# Patient Record
Sex: FEMALE | Race: WHITE | NOT HISPANIC OR LATINO | URBAN - METROPOLITAN AREA
[De-identification: names, ages, dates, MRNs, and addresses within clinical notes are randomized per-mention and may not be internally consistent; named-entity substitution may affect disease eponyms.]

---

## 2017-01-10 ENCOUNTER — OFFICE VISIT (OUTPATIENT)
Dept: OBGYN | Facility: CLINIC | Age: 37
End: 2017-01-10
Attending: ADVANCED PRACTICE MIDWIFE
Payer: COMMERCIAL

## 2017-01-10 VITALS — WEIGHT: 181 LBS | BODY MASS INDEX: 26.72 KG/M2 | SYSTOLIC BLOOD PRESSURE: 114 MMHG | DIASTOLIC BLOOD PRESSURE: 67 MMHG

## 2017-01-10 DIAGNOSIS — L92.9 GRANULATION TISSUE: Primary | ICD-10-CM

## 2017-01-10 PROCEDURE — 99211 OFF/OP EST MAY X REQ PHY/QHP: CPT | Mod: ZF

## 2017-01-10 ASSESSMENT — PAIN SCALES - GENERAL: PAINLEVEL: NO PAIN (0)

## 2017-01-10 NOTE — PROGRESS NOTES
Chief Complaint: Pt for follow-up perineal healing.    Subjective:  Patient is a 36 year old  female who presents with continued perineal discomfort after episiotomy repair. Onset of symptoms was postpartum, reports treatment with silver nitrate at 6 week postpartum visit, felt improvement x 1 week and then traveling x 2 weeks, continued discomfort. Reports occasional bath, but not as frequent as recommended.  Denies intercourse since delivery. Current birth control: POPs, abstinence.   Patient's LMP was 2016 (approximate).    Pertinent Gyn History:  Menses: absent  Bleeding: reports some spotting  Contraception: abstinence and POPs    REVIEW OF SYSTEMS  C: NEGATIVE for fever, chills  E: NEGATIVE for vision changes   R: NEGATIVE for significant cough or SOB  CV: NEGATIVE for chest pain, palpitations   GI: NEGATIVE for nausea, abdominal pain, heartburn, or change in bowel habits  : NEGATIVE for frequency, dysuria, or hematuria  M: NEGATIVE for significant arthralgias or myalgia  N: NEGATIVE for weakness, dizziness or paresthesias or headache    OBJECTIVE:  /67 mmHg  Wt 82.101 kg (181 lb)  LMP 2016 (Approximate)  BMI: Body mass index is 26.72 kg/(m^2).  GENERAL APPEARANCE: healthy, alert and no distress  PSYCH: mentation appears normal and affect normal/bright    PELVIC EXAM:  External Genitalia: WNL  Bartholin's/Urethral Meatus/[place]'s (BUS):  WNL/Negative  Vagina:  Normal mucosa, granulated tissue on R posterior introitus, erythema  Anus/Perineum:  Well healed    WET PREP:Not done  Gonorrhea and chlamydia screen obtained: NO    Assessment/Plan:  Discussed continued healing practices (baths, abstinence)  Silver nitrate treatment today  RTC in 1 month, if discomfort continues or worsen, prn if questions or concerns before annual    Over 50% of the 15 minute visit was spent in face to face counseling as stated above. All patient's questions were discussed and answered. Pt agreed to plan of  care.

## 2017-01-10 NOTE — Clinical Note
1/10/2017       RE: Belle Velez  [address]  [facility] [place] [address]     Dear Colleague,    Thank you for referring your patient, Belle Velez, to the [facility] at [facility]. Please see a copy of my visit note below.    Chief Complaint: Pt for follow-up perineal healing.    Subjective:  Patient is a 36 year old  female who presents with continued perineal discomfort after episiotomy repair. Onset of symptoms was postpartum, reports treatment with silver nitrate at 6 week postpartum visit, felt improvement x 1 week and then traveling x 2 weeks, continued discomfort. Reports occasional bath, but not as frequent as recommended.  Denies intercourse since delivery. Current birth control: POPs, abstinence.   Patient's LMP was 2016 (approximate).    Pertinent Gyn History:  Menses: absent  Bleeding: reports some spotting  Contraception: abstinence and POPs    REVIEW OF SYSTEMS  C: NEGATIVE for fever, chills  E: NEGATIVE for vision changes   R: NEGATIVE for significant cough or SOB  CV: NEGATIVE for chest pain, palpitations   GI: NEGATIVE for nausea, abdominal pain, heartburn, or change in bowel habits  : NEGATIVE for frequency, dysuria, or hematuria  M: NEGATIVE for significant arthralgias or myalgia  N: NEGATIVE for weakness, dizziness or paresthesias or headache    OBJECTIVE:  /67 mmHg  Wt 82.101 kg (181 lb)  LMP 2016 (Approximate)  BMI: Body mass index is 26.72 kg/(m^2).  GENERAL APPEARANCE: healthy, alert and no distress  PSYCH: mentation appears normal and affect normal/bright    PELVIC EXAM:  External Genitalia: WNL  Bartholin's/Urethral Meatus/[place]'s (BUS):  WNL/Negative  Vagina:  Normal mucosa, granulated tissue on R posterior introitus, erythema  Anus/Perineum:  Well healed    WET PREP:Not done  Gonorrhea and chlamydia screen obtained: NO    Assessment/Plan:  Discussed continued healing practices (baths,  abstinence)  Silver nitrate treatment today  RTC in 1 month, if discomfort continues or worsen, prn if questions or concerns before annual    Over 50% of the 15 minute visit was spent in face to face counseling as stated above. All patient's questions were discussed and answered. Pt agreed to plan of care.    Again, thank you for allowing me to participate in the care of your patient.      Sincerely,    GREGORIO Parkinson CNM

## 2017-03-06 ENCOUNTER — OFFICE VISIT (OUTPATIENT)
Dept: OBGYN | Facility: CLINIC | Age: 37
End: 2017-03-06
Attending: ADVANCED PRACTICE MIDWIFE
Payer: COMMERCIAL

## 2017-03-06 VITALS
WEIGHT: 177 LBS | HEART RATE: 69 BPM | BODY MASS INDEX: 26.22 KG/M2 | DIASTOLIC BLOOD PRESSURE: 68 MMHG | SYSTOLIC BLOOD PRESSURE: 118 MMHG | HEIGHT: 69 IN

## 2017-03-06 PROCEDURE — 99212 OFFICE O/P EST SF 10 MIN: CPT | Mod: ZF

## 2017-03-06 NOTE — PROGRESS NOTES
"Nursing Notes:   Briana Frazier, [facility]  3/6/2017 10:06 AM  Signed  Chief Complaint   Patient presents with     Consult For     episiotomy f/u       Briana Freddie, [facility] 3/6/2017     Belle is a  here for follow-up of \"skin tag\" and granulation tissue following episiotomy repair. She is just about 4 months postpartum, delivered 16. She had two treatments with silver nitrate,  and again 1/10 for continued perineal discomfort. States that after her January treatment she soaked in the tub twice daily for a month. Despite these treatments, she reports persistence of a \"skin tag\" that she can still see.  She reports continued periodic discomfort while exercising or changing position while seated - this is not a daily occurrence but is bothersome. States she has had intercourse twice since delivery, and that while she is still getting used to how things feel now that she has had a baby, suspects things feel different due to this skin tag and feels it would get irritated if she were to resume intercourse on a regular basis. She is wondering what the next best step is in managing this.     Is not currently taking POPs - is waiting until she is having intercourse on a regular basis. Is considering IUD as it would make remembering to take her pills one less thing to worry about.  Is an , recently started new job. Otherwise no concerns, baby clary John is doing well.     ================================================================  ROS: 10 point ROS neg other than the symptoms noted above in the HPI.     EXAM:  /68 (BP Location: Left arm, Patient Position: Chair, Cuff Size: Adult Regular)  Pulse 69  Ht 1.753 m (5' 9.02\")  Wt 80.3 kg (177 lb)  BMI 26.12 kg/m2    General: healthy, alert, no distress and cooperative  Vulva:  Normal genitalia and Bartholin's, Urethra, [place]'s normal  Vagina:  normal with good muscle tone, episiotomy incision appears well healed but bright pink/red " apparent granulation tissue in the right posterior introitus, approx 1cm long.  Recto-vaginal:  Anus normal    ASSESSMENT:   Encounter Diagnosis   Name Primary?     Disruption of perineal wound, postpartum Yes      Belle is a 35yo with persistent and increasing amount of granulation tissue following episiotomy repair 4 months ago, s/p two silver nitrate treatments.     PLAN:  Recommend follow-up with OB/Gyn for further evaluation and treatment, will likely require surgical intervention. No silver nitrate treatment today.       I, Emerita Herrmann, served as scribe for GREGORIO Anderson CNM, during this encounter.  Patient was seen with student who acted as my scribe. I personally assessed, examined and made medical decisions reflected in the documentation. Any necessary edits to the note have been made by myself. GREGORIO Doran CNM

## 2017-03-06 NOTE — NURSING NOTE
Chief Complaint   Patient presents with     Consult For     episiotomy f/u       Briana Frazier, LIZETTE 3/6/2017

## 2017-03-06 NOTE — MR AVS SNAPSHOT
After Visit Summary   3/6/2017    Blele Velez    MRN: [id number]           Patient Information     Date Of Birth          1980        Visit Information        Provider Department      3/6/2017 10:00 AM Rosa Dela Cruz APRN CNM [facility]        Today's Diagnoses     Disruption of perineal wound, postpartum    -  1       Follow-ups after your visit        Follow-up notes from your care team     Return in about 1 week (around 3/13/2017), or MD consult perineal repair.      Your next 10 appointments already scheduled     Mar 22, 2017  2:45 PM CDT   RETURN EXTENDED with Nikia Jackman MD   [facility] ([facility])    [facility] [address]  [address]  [facility] [address] [phone]              Who to contact     Please call your clinic at [phone] to:    Ask questions about your health    Make or cancel appointments    Discuss your medicines    Learn about your test results    Speak to your doctor   If you have compliments or concerns about an experience at your clinic, or if you wish to file a complaint, please contact [facility] Patient Relations at [phone] or email us at [email or web address]         Additional Information About Your Visit        MyChart Information     [employer] gives you secure access to your electronic health record. If you see a primary care provider, you can also send messages to your care team and make appointments. If you have questions, please call your primary care clinic.  If you do not have a primary care provider, please call [phone] and they will assist you.      Sova is an electronic gateway that provides easy, online access to your medical records. With Sova, you can request a clinic appointment, read your test results, renew a prescription or communicate with your care team.     To access your existing  "account, please contact your [facility] or call [phone] for assistance.        Care EveryWhere ID     This is your Care EveryWhere ID. This could be used by other organizations to access your [place] medical records  [id number]        Your Vitals Were     Pulse Height BMI (Body Mass Index)             69 1.753 m (5' 9.02\") 26.12 kg/m2          Blood Pressure from Last 3 Encounters:   03/06/17 118/68   01/10/17 114/67   12/22/16 106/66    Weight from Last 3 Encounters:   03/06/17 80.3 kg (177 lb)   01/10/17 82.1 kg (181 lb)   12/22/16 82.4 kg (181 lb 11.2 oz)              Today, you had the following     No orders found for display       Primary Care Provider Office Phone # Fax #    Rosa Isaacs Lanie, GREGORIO ISIDRA [phone]       [facility] [address] [facility] [address]  [facility] [address]        Thank you!     Thank you for choosing [facility]  for your care. Our goal is always to provide you with excellent care. Hearing back from our patients is one way we can continue to improve our services. Please take a few minutes to complete the written survey that you may receive in the mail after your visit with us. Thank you!             Your Updated Medication List - Protect others around you: Learn how to safely use, store and throw away your medicines at www.disposemymeds.org.          This list is accurate as of: 3/6/17 11:59 PM.  Always use your most recent med list.                   Brand Name Dispense Instructions for use    ibuprofen 400 MG tablet    ADVIL/MOTRIN    120 tablet    Take 1-2 tablets (400-800 mg) by mouth every 6 hours as needed for other (cramping)       norethindrone 0.35 MG per tablet    MICRONOR    28 tablet    Take 1 tablet (0.35 mg) by mouth daily       PRENATAL VITAMIN PO      Take 1 tablet by mouth daily         "

## 2017-03-06 NOTE — LETTER
"3/6/2017       RE: Belle Velez  [address]  [place] [place] [facility] [address]     Dear Colleague,    Thank you for referring your patient, Belle Velez, to the [facility] at [facility]. Please see a copy of my visit note below.    Nursing Notes:   ClarkeBurchBriana kraus, LIZETTE  3/6/2017 10:06 AM  Signed  Chief Complaint   Patient presents with     Consult For     episiotomy f/u       Briana Freddie, [facility] 3/6/2017     Belle is a  here for follow-up of \"skin tag\" and granulation tissue following episiotomy repair. She is just about 4 months postpartum, delivered 16. She had two treatments with silver nitrate,  and again 1/10 for continued perineal discomfort. States that after her January treatment she soaked in the tub twice daily for a month. Despite these treatments, she reports persistence of a \"skin tag\" that she can still see.  She reports continued periodic discomfort while exercising or changing position while seated - this is not a daily occurrence but is bothersome. States she has had intercourse twice since delivery, and that while she is still getting used to how things feel now that she has had a baby, suspects things feel different due to this skin tag and feels it would get irritated if she were to resume intercourse on a regular basis. She is wondering what the next best step is in managing this.     Is not currently taking POPs - is waiting until she is having intercourse on a regular basis. Is considering IUD as it would make remembering to take her pills one less thing to worry about.  Is an , recently started new job. Otherwise no concerns, baby boy Alvaro is doing well.     ================================================================  ROS: 10 point ROS neg other than the symptoms noted above in the HPI.     EXAM:  /68 (BP Location: Left arm, Patient Position: Chair, Cuff Size: Adult Regular) " " Pulse 69  Ht 1.753 m (5' 9.02\")  Wt 80.3 kg (177 lb)  BMI 26.12 kg/m2    General: healthy, alert, no distress and cooperative  Vulva:  Normal genitalia and Bartholin's, Urethra, [place]'s normal  Vagina:  normal with good muscle tone, episiotomy incision appears well healed but bright pink/red apparent granulation tissue in the right posterior introitus, approx 1cm long.  Recto-vaginal:  Anus normal    ASSESSMENT:   Encounter Diagnosis   Name Primary?     Disruption of perineal wound, postpartum Yes      Belle is a 35yo with persistent and increasing amount of granulation tissue following episiotomy repair 4 months ago, s/p two silver nitrate treatments.     PLAN:  Recommend follow-up with OB/Gyn for further evaluation and treatment, will likely require surgical intervention. No silver nitrate treatment today.       I, Emerita Herrmann, served as scribe for GREGORIO Anderson CNM, during this encounter.  Patient was seen with student who acted as my scribe. I personally assessed, examined and made medical decisions reflected in the documentation. Any necessary edits to the note have been made by myself.   GREGORIO Doran CNM            "

## 2017-03-22 ENCOUNTER — OFFICE VISIT (OUTPATIENT)
Dept: OBGYN | Facility: CLINIC | Age: 37
End: 2017-03-22
Payer: COMMERCIAL

## 2017-03-22 VITALS
SYSTOLIC BLOOD PRESSURE: 126 MMHG | WEIGHT: 173 LBS | BODY MASS INDEX: 25.62 KG/M2 | HEART RATE: 80 BPM | HEIGHT: 69 IN | DIASTOLIC BLOOD PRESSURE: 77 MMHG

## 2017-03-22 DIAGNOSIS — N94.9 PERINEAL DISCOMFORT IN FEMALE: ICD-10-CM

## 2017-03-22 DIAGNOSIS — N89.8 VAGINAL LACERATION, OLD: Primary | ICD-10-CM

## 2017-03-22 PROCEDURE — 99213 OFFICE O/P EST LOW 20 MIN: CPT | Mod: ZF

## 2017-03-22 ASSESSMENT — PAIN SCALES - GENERAL: PAINLEVEL: NO PAIN (0)

## 2017-03-22 NOTE — MR AVS SNAPSHOT
After Visit Summary   3/22/2017    Belle Velez    MRN: [id number]           Patient Information     Date Of Birth          1980        Visit Information        Provider Department      3/22/2017 2:45 PM Nikia Jackman MD [facility]        Today's Diagnoses     Vaginal laceration, old    -  1    Perineal discomfort in female           Follow-ups after your visit        Follow-up notes from your care team     Return in about 2 weeks (around 4/5/2017).      Your next 10 appointments already scheduled     Apr 06, 2017  3:45 PM CDT   Return Visit with Radha Whitaker MD   [facility] ([facility])    [facility] [address]  [address]  [address]  [facility] [address] [phone]              Who to contact     Please call your clinic at [phone] to:    Ask questions about your health    Make or cancel appointments    Discuss your medicines    Learn about your test results    Speak to your doctor   If you have compliments or concerns about an experience at your clinic, or if you wish to file a complaint, please contact [facility] Patient Relations at [phone] or email us at [email or web address]         Additional Information About Your Visit        MyChart Information     [employer] gives you secure access to your electronic health record. If you see a primary care provider, you can also send messages to your care team and make appointments. If you have questions, please call your primary care clinic.  If you do not have a primary care provider, please call [phone] and they will assist you.      [employer] is an electronic gateway that provides easy, online access to your medical records. With [employer], you can request a clinic appointment, read your test results, renew a prescription or communicate with your care team.     To access your existing account, please contact  "your [facility] or call [phone] for assistance.        Care EveryWhere ID     This is your Care EveryWhere ID. This could be used by other organizations to access your [place] medical records  [id number]        Your Vitals Were     Pulse Height Breastfeeding? BMI (Body Mass Index)          80 1.753 m (5' 9\") Yes 25.55 kg/m2         Blood Pressure from Last 3 Encounters:   03/22/17 126/77   03/06/17 118/68   01/10/17 114/67    Weight from Last 3 Encounters:   03/22/17 78.5 kg (173 lb)   03/06/17 80.3 kg (177 lb)   01/10/17 82.1 kg (181 lb)              Today, you had the following     No orders found for display         Today's Medication Changes          These changes are accurate as of: 3/22/17 11:59 PM.  If you have any questions, ask your nurse or doctor.               Start taking these medicines.        Dose/Directions    conjugated estrogens cream   Commonly known as:  PREMARIN   Used for:  Vaginal laceration, old   Started by:  Nikia Jackman MD        Dose:  0.5 g   Place 0.5 g vaginally every 24 hours for 14 days   Quantity:  30 g   Refills:  0            Where to get your medicines      Some of these will need a paper prescription and others can be bought over the counter.  Ask your nurse if you have questions.     Bring a paper prescription for each of these medications     conjugated estrogens cream                Primary Care Provider Office Phone # Fax #    Rosa Dela Cruz, APRN [facility] [phone]       [facility] [address] [facility] [address]  [facility] [address]        Thank you!     Thank you for choosing [facility]  for your care. Our goal is always to provide you with excellent care. Hearing back from our patients is one way we can continue to improve our services. Please take a few minutes to complete the written survey that you may receive in the mail after your visit with us. Thank you!             Your " Updated Medication List - Protect others around you: Learn how to safely use, store and throw away your medicines at www.disposemymeds.org.          This list is accurate as of: 3/22/17 11:59 PM.  Always use your most recent med list.                   Brand Name Dispense Instructions for use    conjugated estrogens cream    PREMARIN    30 g    Place 0.5 g vaginally every 24 hours for 14 days       ibuprofen 400 MG tablet    ADVIL/MOTRIN    120 tablet    Take 1-2 tablets (400-800 mg) by mouth every 6 hours as needed for other (cramping)       norethindrone 0.35 MG per tablet    MICRONOR    28 tablet    Take 1 tablet (0.35 mg) by mouth daily       PRENATAL VITAMIN PO      Take 1 tablet by mouth daily

## 2017-03-23 NOTE — PROGRESS NOTES
"[facility] Visit    CC: Skin tag    S: Belle Velez is a 36 year old  s/p  on 2016.  Her delivery was uncomplicated aside from a second degree perineal laceration which was a result of an episiotomy.  After delivery, she had problems with persistent granulation tissue in the area of her episiotomy which was treated twice with silver nitrate ( and 1/10).  She was most recently evaluated by a CNM on 3/6/17 for follow up and at that time, was noted to have a 1 cm area of granulation tissue remaining. She was referred to OBGYN for further evaluation and treatment of the granulation tissue.    Belle states she has been doing well overall since delivery.  She continues to notice occasional twinges of discomfort near the area of the perineal skin tag.  She has had intercourse since delivery and states that the same area feels irritated during sex. The discomfort she experiences does not affect her daily activities but it is frustrating that it has been going on for so long.    O: /77  Pulse 80  Ht 1.753 m (5' 9\")  Wt 78.5 kg (173 lb)  Breastfeeding? Yes  BMI 25.55 kg/m2  General: No distress  Pelvic Exam:  Vulva: Normal hair distribution, normal architecture. There is evidence of a healed laceration at the 7:00 position, scar appears mildly retracted.  There is a 6 mm portion of hymen that is not attached to the base of the vagina and it what the patient has been describing as a skin tag.  Just posterior to the hymeneal remnant, there is an area which appears consistent with healed granulation tissue.  Vagina: Moist, pink, no abnormal discharge, well rugated,.    A/P: Belle Velez is a 36 year old  s/p  in  which a PP course complicated by persistent perineal discomfort.    # Perineal discomfort  - No evidence of ongoing granulation tissue today  - Given retracted perineal scar, recommended application of estrogen cream daily with massage to " the affected area for 2 weeks    She will follow up in 2 weeks to reassess her symptoms and for a follow up examination    Nkiia Jackman MD  OBGYN PGY4    The Patient was seen in [facility] by NIKIA JACKMAN.  I was present for exam. Assessment and plan were jointly made.    Maris Samano MD

## 2017-03-24 PROBLEM — L92.9 GRANULATION TISSUE: Status: RESOLVED | Noted: 2017-01-10 | Resolved: 2017-03-24

## 2017-03-24 PROBLEM — N94.9 PERINEAL DISCOMFORT IN FEMALE: Status: ACTIVE | Noted: 2017-03-24

## 2017-04-06 ENCOUNTER — OFFICE VISIT (OUTPATIENT)
Dept: OBGYN | Facility: CLINIC | Age: 37
End: 2017-04-06
Attending: INTERNAL MEDICINE
Payer: COMMERCIAL

## 2017-04-06 VITALS
HEART RATE: 76 BPM | DIASTOLIC BLOOD PRESSURE: 67 MMHG | HEIGHT: 69 IN | WEIGHT: 169.7 LBS | BODY MASS INDEX: 25.13 KG/M2 | SYSTOLIC BLOOD PRESSURE: 119 MMHG

## 2017-04-06 DIAGNOSIS — N94.9 PERINEAL DISCOMFORT IN FEMALE: Primary | ICD-10-CM

## 2017-04-06 ASSESSMENT — PAIN SCALES - GENERAL: PAINLEVEL: NO PAIN (0)

## 2017-04-06 NOTE — PATIENT INSTRUCTIONS
You may continue the estrogen cream if it helping. Otherwise, it is okay to stop using the estrogen cream. Please contact the clinic with any questions or concerns. If the skin tag that is part of the hymen becomes irritated or bothersome, you may return to the clinic to have it removed.

## 2017-04-06 NOTE — PROGRESS NOTES
"[facility]  Gynecology Visit    HPI:    Belle Velez is a 36 year old , here for a follow-up visit for perineal discomfort. She is s/p  on 2016 during which she sustained a second degree laceration following episiotomy. She was treated for granulation tissue with silver nitrate in 2016 and 2016. She attempted intercourse twice in 2016 but had extreme perineal discomfort. She also had pain with sitting and with increased abdominal pressure (coughing, having a bowel movement). She was seen by Dr. Jackman on 3/22 and prescribed topical estrogen cream.     Since her last visit, Ms. Velez believes the perineal discomfort is improving. She only notices a \"twinge\" of pain when she coughs. She is able to sit comfortably and tolerates some exercise without pain. She has not had intercourse since January, and she is frustrated by how long it is taking for her to heal.     GYN History  - Menses: No LMP recorded. Patient is not currently having periods (Reason: Postpartum).    OBHx  Obstetric History       T1      TAB0   SAB0   E0   M0   L1       # Outcome Date GA Lbr Ren/2nd Weight Sex Delivery Anes PTL Lv   1 Term 16 40w1d 05:05 / 05:51 3.657 kg (8 lb 1 oz) M Vag-Spont Local,EPI N Y      Name: KENY,KARO LOPEZ      Apgar1:  8                Apgar5: 9        ROS: 10-Point ROS negative except as noted in HPI    Physical Exam  /67 (BP Location: Left arm, Patient Position: Chair, Cuff Size: Adult Regular)  Pulse 76  Ht 1.753 m (5' 9\")  Wt 77 kg (169 lb 11.2 oz)  BMI 25.06 kg/m2  Body mass index is 25.06 kg/(m^2).  Gen: Well-appearing, NAD  Pelvic:  Normal appearing external female genitalia. Vagina appears well-estrogenized. Scar tissue present over well-healed laceration between 6-7 o'clock on the perineum. A 5mm portion of hymen without erythema present that is not attached to the vagina. No evidence of granulation tissue.     Assessment/Plan:  Belle" Agustin is a 36 year old  female here for a follow-up exam for perineal discomfort following a second degree perineal laceration with granulation tissue. She is s/p silver nitrate x2 (2016, 2017) and 2 weeks of topical estrogen cream. It appears to be well healing at this time without evidence of erythema or irritation.     - May continue to use the estrogen cream if it seems to be helping. It is not necessary to apply every day, and she can stop using it if it is more of a hassle.   - May resume intercourse. Encouraged to use good amount of lubrication.   - If the hymenal tag becomes erythematous, edematous or bothersome Ms. Velez can return and have it removed.   - Contact the clinic with any questions or concerns.     Radha Whitaker MD  OB/GYN, PGY1  17    The Patient was seen in [facility] by RADHA WHITAKER.  I have seen and examined the patient. I reviewed the history & exam. Assessment and plan were jointly made.    Sona Ibarra MD

## 2017-04-06 NOTE — MR AVS SNAPSHOT
After Visit Summary   4/6/2017    Belle Velez    MRN: [id number]           Patient Information     Date Of Birth          1980        Visit Information        Provider Department      4/6/2017 3:45 PM Radha Whitaker MD [facility]        Today's Diagnoses     Perineal discomfort in female    -  1      Care Instructions    You may continue the estrogen cream if it helping. Otherwise, it is okay to stop using the estrogen cream. Please contact the clinic with any questions or concerns. If the skin tag that is part of the hymen becomes irritated or bothersome, you may return to the clinic to have it removed.         Follow-ups after your visit        Follow-up notes from your care team     Return if symptoms worsen or fail to improve.      Who to contact     Please call your clinic at [phone] to:    Ask questions about your health    Make or cancel appointments    Discuss your medicines    Learn about your test results    Speak to your doctor   If you have compliments or concerns about an experience at your clinic, or if you wish to file a complaint, please contact [facility] Patient Relations at [phone] or email us at [email or web address]         Additional Information About Your Visit        MyChart Information     [employer] gives you secure access to your electronic health record. If you see a primary care provider, you can also send messages to your care team and make appointments. If you have questions, please call your primary care clinic.  If you do not have a primary care provider, please call [phone] and they will assist you.      [employer] is an electronic gateway that provides easy, online access to your medical records. With [employer] you can request a clinic appointment, read your test results, renew a prescription or communicate with your care team.     To access your existing account, please contact your  "[facility] or call [phone] for assistance.        Care EveryWhere ID     This is your Care EveryWhere ID. This could be used by other organizations to access your [place] medical records  [id number]        Your Vitals Were     Pulse Height BMI (Body Mass Index)             76 1.753 m (5' 9\") 25.06 kg/m2          Blood Pressure from Last 3 Encounters:   04/06/17 119/67   03/22/17 126/77   03/06/17 118/68    Weight from Last 3 Encounters:   04/06/17 77 kg (169 lb 11.2 oz)   03/22/17 78.5 kg (173 lb)   03/06/17 80.3 kg (177 lb)              Today, you had the following     No orders found for display       Primary Care Provider Office Phone # Fax #    Rosa Dela Cruz, GREGORIO ISIDRA [phone]       [facility] [address] [facility] [address]  [facility] [address]        Thank you!     Thank you for choosing [facility]  for your care. Our goal is always to provide you with excellent care. Hearing back from our patients is one way we can continue to improve our services. Please take a few minutes to complete the written survey that you may receive in the mail after your visit with us. Thank you!             Your Updated Medication List - Protect others around you: Learn how to safely use, store and throw away your medicines at www.disposemymeds.org.          This list is accurate as of: 4/6/17 11:59 PM.  Always use your most recent med list.                   Brand Name Dispense Instructions for use    ibuprofen 400 MG tablet    ADVIL/MOTRIN    120 tablet    Take 1-2 tablets (400-800 mg) by mouth every 6 hours as needed for other (cramping)       norethindrone 0.35 MG per tablet    MICRONOR    28 tablet    Take 1 tablet (0.35 mg) by mouth daily       PRENATAL VITAMIN PO      Take 1 tablet by mouth daily Reported on 4/6/2017         "

## 2017-08-08 ENCOUNTER — MYC MEDICAL ADVICE (OUTPATIENT)
Dept: FAMILY MEDICINE | Facility: CLINIC | Age: 37
End: 2017-08-08

## 2017-08-08 NOTE — TELEPHONE ENCOUNTER
Edel,  Please see [employer] message below. Pt has a lactation appt scheduled for 8/16/17.    Nikia Dailey RN  [facility]

## 2017-08-16 ENCOUNTER — OFFICE VISIT (OUTPATIENT)
Dept: FAMILY MEDICINE | Facility: CLINIC | Age: 37
End: 2017-08-16
Payer: COMMERCIAL

## 2017-08-16 DIAGNOSIS — O92.70 LACTATION PROBLEM: Primary | ICD-10-CM

## 2017-08-16 PROCEDURE — 99214 OFFICE O/P EST MOD 30 MIN: CPT | Performed by: NURSE PRACTITIONER

## 2017-08-16 NOTE — MR AVS SNAPSHOT
After Visit Summary   8/16/2017    Belle Velez    MRN: [id number]           Patient Information     Date Of Birth          1980        Visit Information        Provider Department      8/16/2017 11:45 AM Kamila Schneider APRN CNP [facility]        Today's Diagnoses     Lactation problem    -  1       Follow-ups after your visit        Who to contact     If you have questions or need follow up information about today's clinic visit or your schedule please contact [facility] directly at [phone].  Normal or non-critical lab and imaging results will be communicated to you by [employer], letter or phone within 4 business days after the clinic has received the results. If you do not hear from us within 7 days, please contact the clinic through [email or web address] or phone. If you have a critical or abnormal lab result, we will notify you by phone as soon as possible.  Submit refill requests through [employer] or call your pharmacy and they will forward the refill request to us. Please allow 3 business days for your refill to be completed.          Additional Information About Your Visit        MyChart Information     [employer] gives you secure access to your electronic health record. If you see a primary care provider, you can also send messages to your care team and make appointments. If you have questions, please call your primary care clinic.  If you do not have a primary care provider, please call [phone] and they will assist you.        Care EveryWhere ID     This is your Care EveryWhere ID. This could be used by other organizations to access your [place] medical records  [id number]         Blood Pressure from Last 3 Encounters:   04/06/17 119/67   03/22/17 126/77   03/06/17 118/68    Weight from Last 3 Encounters:   04/06/17 169 lb 11.2 oz (77 kg)   03/22/17 173 lb (78.5 kg)   03/06/17 177 lb (80.3 kg)              Today, you had the following     No orders found  for display         Today's Medication Changes          These changes are accurate as of: 8/16/17 11:59 PM.  If you have any questions, ask your nurse or doctor.               Stop taking these medicines if you haven't already. Please contact your care team if you have questions.     ibuprofen 400 MG tablet   Commonly known as:  ADVIL/MOTRIN   Stopped by:  Kamila Schneider APRN CNP           norethindrone 0.35 MG per tablet   Commonly known as:  MICRONOR   Stopped by:  Kamila Schneider APRN CNP                    Primary Care Provider Office Phone # Fax #    Rosa Isaacs Page, APRN -[phone]       [address] [facility] [address]  [facility] [address]        Equal Access to Services     ABDOUL [facility] HospitalDEUCE : Hadii colin phillipo Soraoul, waaxda luqadaha, qaybta kaalmada adeegyada, kan bacon . So [facility] [phone].    ATENCIÓN: Si habla español, tiene a canas disposición servicios gratuitos de asistencia lingüística. Llame al [phone].    We comply with applicable federal civil rights laws and [place] laws. We do not discriminate on the basis of race, color, national origin, age, disability sex, sexual orientation or gender identity.            Thank you!     Thank you for choosing [facility]  for your care. Our goal is always to provide you with excellent care. Hearing back from our patients is one way we can continue to improve our services. Please take a few minutes to complete the written survey that you may receive in the mail after your visit with us. Thank you!             Your Updated Medication List - Protect others around you: Learn how to safely use, store and throw away your medicines at www.disposemymeds.org.          This list is accurate as of: 8/16/17 11:59 PM.  Always use your most recent med list.                   Brand Name Dispense Instructions for use Diagnosis    PRENATAL VITAMIN PO      Take 1 tablet by mouth daily Reported on  4/6/2017    Encounter for supervision of normal first pregnancy in third trimester

## 2017-08-16 NOTE — NURSING NOTE
"Chief Complaint   Patient presents with     Lactation Consult       Initial There were no vitals taken for this visit. Estimated body mass index is 25.06 kg/(m^2) as calculated from the following:    Height as of 4/6/17: 5' 9\" (1.753 m).    Weight as of 4/6/17: 169 lb 11.2 oz (77 kg).  Medication Reconciliation: complete     Jarad Rodgers MA      "

## 2017-09-21 NOTE — PROGRESS NOTES
Initial Lactation Consultation    Baby:  Alvaro [facility]         MRN:  [id number]  Mom:      Consultation Date: 8/16/2017    HPI  Breastfeeding long-term goals: Hoping 1 year at least and nurse as long as baby want to keep nursing,   Breastfeeding story:   I have a lactation consultation on Wednesday 8/16 and thought I would email ahead of time in case there is information you will want to discuss that I should think about ahead of time.  I'm not always quick with details on the spot and I want to make good use of my time.   I am breastfeeding and my baby is nine months.  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  In June/July, I started experiencing a drop in the amount of I am able to pump and it seems to be getting worse.  Baby has 15-18 oz breastmilk at day care every day and I pump 8-10 oz, sometimes less, in 3 or 4 pumping sessions.  (FYI, pumping 4x at work is likely not a sustainable schedule for me.)  Baby seems to be getting enough when I nurse and is healthy, so I feel like it's an issue with how my breasts respond to the pump.  I had a reserve of frozen breastmilk when I started working, so I have not had to supplement with formula, but soon will to meet his  needs.  I'd prefer not to if I can increase my pumping production, but am open to it if necessary.  But I am also concerned about production reducing to the point where I'm not able to nurse him mornings/evenings as long as I want to.  I'd like to continue to breastfeed through the calendar year, longer if possible.  I'm looking for help with a plan to maintain what production I have and any assistance on increasing what I'm able to pump.   Ok start to nursing.  Left side difficulties with left nipple inversion.  Used nipple shield for a little over a month.  This side has always produced less.  He has always eaten well.  Production was fine.  Returned to work full time mid-February.  Had pumping and used passive pump before RTW and had a good stash.   "Started sleeping through the night until mid-May, so stopped night pumping.  For some time then switched to early morning pumping.  Now only pumping at work.  Sleeps through the night all except about two times a week.   working for the [employer].    Nursing 2 times per day/every 2-3 hours.  Nursing on both side(s).  Nursing sessions last 7-10 minutes per side.    Nipple pain: None    PUMPING: Pump in Style, new to patient; has replaced membranes twice so far  # times per day:  2-3 times   Right now pumping 6-9 oz total per day    SUPPLEMENTATION: None but might start using  At  8-9.5 hours per day  Has 3 - 5 oz bottles, sometimes up to 20 oz  Has seen some changes with teething    Baby's OUTPUT:   A few stooled diapers with soft yellow appearance    MOTHER      Breastfeeding History  NoN/A      Pregnancy History  First pregnancy     Delivery History  Vaginal    Labor Meds/Anesthesia  Epidural    Current Medications  None    Herbals:  Fenugreek product  - took for 1 1/2 weeks, did smell maple syrup once  Calcium magnesium supplement        BABY       Name: Alvaro Cordova Birth Date: [birth year]     Doctor: Dr. Worrell [facility]     BABY'S WEIGHT HISTORY    Wt Readings from Last 5 Encounters:   08/18/17 22 lb 10 oz (10.3 kg) (89 %)*   08/16/17 22 lb 10.5 oz (10.3 kg) (90 %)*   05/12/17 19 lb 13 oz (8.987 kg) (86 %)*   03/06/17 17 lb 5 oz (7.853 kg) (86 %)*   12/20/16 11 lb 11.5 oz (5.316 kg) (74 %)*     * Growth percentiles are based on WHO (Boys, 0-2 years) data.       ASSESSMENT OF BABY    Physical:   Temp 97.9  F (36.6  C) (Axillary)  Ht 2' 5\" (0.737 m)  Wt 22 lb 10.5 oz (10.3 kg)  HC 19\" (48.3 cm)  BMI 18.94 kg/m2    GENERAL: Alert, vigorous, is in no acute distress.  SKIN: skin is clear, no rash or abnormal pigmentation  EYES: The eyes are normal. The conjunctivae and cornea normal.   NOSE: Clear, no discharge or congestion  MOUTH: The mouth is clear.  NEUROLOGIC: Normal tone throughout. " "      SUMMARY  Secondary dip in supply    RECOMMENDATIONS  Patient Instructions   INCREASING MILK SUPPLY  Increase efforts for two weeks with increased pumping and supplements  Also consider asking  to do \"paced feedings\" and consider giving smaller volume bottles  Most babies need 10-12 oz per day, 15 oz might be a bit high for [place]  It would be ok to have a bottle of formula at     Pumping after breastfeeding 1-2 times with morning feedings on the weekend  Pump 3 times at work  Aim for two let-downs per pumping session  Consider Power Pumping - described below - over the weekend  Try a smaller flange - 21 mm  Use your newer pump for all the pumpings    Fenugreek supplement for mother-  (to increase milk production):  Fenugreek capsules: 3 capsules 3 times daily for 1-2 weeks. Dosage range should be 1000-1500mg three times/day.   OR  Moringa/M[place]ung[place] capsules (Go-Lacta is one brand) - dose range is 700-1050 mg 2-3 times a day  Moring powder product at [facility] [place] [facility]    BONUS  Oatmeal for mother-helps to increase milk supply- oatmeal cookies too!     What is power pumping?   Power pumping is a technique that involves mimicking the frequent feeding of a baby experiencing a growth spurt. During these times your baby s more vigorous, more frequent and longer suckling triggers an increased release of prolactin from the pituitary gland - the  make more milk!  message.  How do I power pump?   Power pumping is not a replacement for regular breast pumping to increase supply. Instead, power pumping is intended to boost your progress by replacing one regular pumping session with a strategically designed alternative. It works by repeatedly emptying the breast, signalling the body to make more milk, more quickly.  To power pump, pick one hour each day or night (eg. 7 am every morning) and use the following pumping pattern:  1. Pump for 20 minutes; rest 10 minutes   2. Pump another 10 " minutes; rest for 10 minutes   3. Pump again for 10 minutes; finish   This provides 40 minutes of pumping in a 60 minute period. At other times during the day, use routine pumping. Some women find implementing power pumping on three consecutive days or nights is sufficient, while others may power pump for up to seven consecutive days to get results.  When will I see results?   Some women begin producing extra milk within 48 hours of finishing one cycle. Other women may not start to see results for up to a week so don t be discouraged if it takes a little longer. The key is perseverance.  Power Pumping Boot Camp!   Some women may find it more effective, both from a time management and milk production perspective, to concentrate their efforts and have a power pumping weekend. Some lactation consultants refer to this as a  Power Pumping Boot Camp.   Power Pumping Boot Camp involves using the power pumping pattern at each pumping session for a couple of days before returning to routine pumping. Many women find aiming for four sessions a day for two days effective. Given sleep is just as important to breast milk production as pumping, do not get up to power pump during the night.  How do I keep track of the time during power pumping?   The obvious answers would seem to be, have a clock handy or use an alarm. However both low supply and pumping can be stressful, and clock watching or fiddling with an alarm may make the experience even more so. Time keeping strategies which are not quite so accurate time-wise but might be less stressful and ultimately more effective include:  Watching a favourite TV program, pumping during the commercials and resting during the show   Watching a movie, pumping through one scene and resting during the next   Listening to music, pumping during two songs and resting during the next two   Reading a book placing your book kevan four to six pages ahead - pump until you reach it and then move it  "ahead and rest until you reach it again     Remember this, though. No pump can remove all of the milk in your breasts, so it's not an accurate indicator of how much milk you really have.       How to do the power pump:     \"Power pumping involves using regular pumping techniques and setup, but in a unique way. The idea is to mimic a baby who is nursing frequently to increase a mother s supply, as is common in the nursing relationship during a growth spurt.\" - Yeny Sanchez,  of [employer]  Prepare for a normal pumping session, pump for 10-20 minutes.   Rest for 10 minutes   Pump 10 minutes   Rest 10 minutes   Pump for 10 minutes   Continue this cycle for 60 minutes once a day, for up to a few days.   The amount of milk you pump by the end of the session doesn't matter - it may be only drops - but the goal is to stimulate your breasts and the \"supply and demand\" principle to encourage your body to make more milk.     Some extra tips:   If you can use a hospital-grade pump, it'll help even more! I rented a Medela for the first month and loved it. Search \"breast pump rental\" and your city name, or contact the lactation consultants at your local hospital, midwifery school or birthing center.   Investing in a hands-free breastpump bra makes pumping sessions so much easier - you can flip through a magazine, eat a meal, or catch up on blogs.   Try not to watch the amount of milk coming out of the pump - think of this saying: A watched pot never boils. Same for pumping.   Remember when we talked about using relaxation and visualization? It works for pumping, too! One study has shown that the moms of hospitalized babies who listened to guided relaxation or soothing music while pumping had an increased pumping output - up to 2-3 times their normal output!   More pumping resources:   Pumping at [facility]        Follow up: as needed    60 minutes time spent face-to-face, 30 with mother and 30 with baby, with over 50% spent in " counseling/coordination of care regarding breastfeeding goals, latch, nipple care, weight gain expectations, and pumping.     CHICA Singletary

## 2017-10-19 ENCOUNTER — ALLIED HEALTH/NURSE VISIT (OUTPATIENT)
Dept: NURSING | Facility: CLINIC | Age: 37
End: 2017-10-19
Payer: COMMERCIAL

## 2017-10-19 DIAGNOSIS — Z23 NEED FOR PROPHYLACTIC VACCINATION AND INOCULATION AGAINST INFLUENZA: Primary | ICD-10-CM

## 2017-10-19 PROCEDURE — 99207 ZZC NO CHARGE NURSE ONLY: CPT

## 2017-10-19 PROCEDURE — 90686 IIV4 VACC NO PRSV 0.5 ML IM: CPT

## 2017-10-19 PROCEDURE — 90471 IMMUNIZATION ADMIN: CPT

## 2017-10-19 NOTE — PROGRESS NOTES

## 2017-10-19 NOTE — MR AVS SNAPSHOT
After Visit Summary   10/19/2017    Belle Velez    MRN: [id number]           Patient Information     Date Of Birth          1980        Visit Information        Provider Department      10/19/2017 3:35 PM CARE COORDINATOR [facility]        Today's Diagnoses     Need for prophylactic vaccination and inoculation against influenza    -  1       Follow-ups after your visit        Who to contact     If you have questions or need follow up information about today's clinic visit or your schedule please contact [facility] directly at [phone].  Normal or non-critical lab and imaging results will be communicated to you by Art Sumohart, letter or phone within 4 business days after the clinic has received the results. If you do not hear from us within 7 days, please contact the clinic through [employer] or phone. If you have a critical or abnormal lab result, we will notify you by phone as soon as possible.  Submit refill requests through [employer] or call your pharmacy and they will forward the refill request to us. Please allow 3 business days for your refill to be completed.          Additional Information About Your Visit        MyChart Information     [employer] gives you secure access to your electronic health record. If you see a primary care provider, you can also send messages to your care team and make appointments. If you have questions, please call your primary care clinic.  If you do not have a primary care provider, please call [phone] and they will assist you.        Care EveryWhere ID     This is your Care EveryWhere ID. This could be used by other organizations to access your [place] medical records  [id number]         Blood Pressure from Last 3 Encounters:   04/06/17 119/67   03/22/17 126/77   03/06/17 118/68    Weight from Last 3 Encounters:   04/06/17 169 lb 11.2 oz (77 kg)   03/22/17 173 lb (78.5 kg)   03/06/17 177 lb  (80.3 kg)              We Performed the Following     FLU VAC, SPLIT VIRUS IM > 3 YO (QUADRIVALENT) [49792]     Vaccine Administration, Initial [[id number]]        Primary Care Provider Office Phone # Fax #    GREGORIO Anderson [phone]       [address] [facility] [address]  [facility] [address]        Equal Access to Services     [facility]: Hadii aad ku hadasho Soomaali, waaxda luqadaha, qaybta kaalmada adeegyada, waxay idiin hayaan adeeg kharash la'aan ah. So [facility] [phone].    ATENCIÓN: Si habla español, tiene a canas disposición servicios gratuitos de asistencia lingüística. Toni al [phone].    We comply with applicable federal civil rights laws and [place] laws. We do not discriminate on the basis of race, color, national origin, age, disability, sex, sexual orientation, or gender identity.            Thank you!     Thank you for choosing [facility]  for your care. Our goal is always to provide you with excellent care. Hearing back from our patients is one way we can continue to improve our services. Please take a few minutes to complete the written survey that you may receive in the mail after your visit with us. Thank you!             Your Updated Medication List - Protect others around you: Learn how to safely use, store and throw away your medicines at www.disposemymeds.org.          This list is accurate as of: 10/19/17  3:55 PM.  Always use your most recent med list.                   Brand Name Dispense Instructions for use Diagnosis    PRENATAL VITAMIN PO      Take 1 tablet by mouth daily Reported on 4/6/2017    Encounter for supervision of normal first pregnancy in third trimester

## 2017-12-23 ENCOUNTER — OFFICE VISIT (OUTPATIENT)
Dept: URGENT CARE | Facility: URGENT CARE | Age: 37
End: 2017-12-23
Payer: COMMERCIAL

## 2017-12-23 VITALS
WEIGHT: 158 LBS | HEART RATE: 67 BPM | OXYGEN SATURATION: 97 % | SYSTOLIC BLOOD PRESSURE: 116 MMHG | HEIGHT: 69 IN | TEMPERATURE: 99 F | DIASTOLIC BLOOD PRESSURE: 78 MMHG | BODY MASS INDEX: 23.4 KG/M2

## 2017-12-23 DIAGNOSIS — J02.9 ACUTE PHARYNGITIS, UNSPECIFIED ETIOLOGY: Primary | ICD-10-CM

## 2017-12-23 PROCEDURE — 99203 OFFICE O/P NEW LOW 30 MIN: CPT | Performed by: PHYSICIAN ASSISTANT

## 2017-12-23 RX ORDER — CETIRIZINE HYDROCHLORIDE 10 MG/1
10 TABLET ORAL EVERY EVENING
Qty: 30 TABLET | Refills: 1 | Status: SHIPPED | OUTPATIENT
Start: 2017-12-23 | End: 2020-10-09

## 2017-12-23 NOTE — PROGRESS NOTES
"SUBJECTIVE:  Belle Velez is a 37 year old female with a chief complaint of sore throat.  Onset of symptoms was 2 week(s) ago.    Course of illness: gradual onset.  Severity moderate  Current and Associated symptoms: stuffy nose and sore throat  Treatment measures tried include Tylenol/Ibuprofen.  Predisposing factors include None.    Past Medical History:   Diagnosis Date     Anxiety      NO ACTIVE PROBLEMS      Skin lesion      Current Outpatient Prescriptions   Medication Sig Dispense Refill     Prenatal Vit-Fe Fumarate-FA (PRENATAL VITAMIN PO) Take 1 tablet by mouth daily Reported on 4/6/2017       Social History   Substance Use Topics     Smoking status: Never Smoker     Smokeless tobacco: Never Used     Alcohol use Yes      Comment: 1-3 drinks per week, but none since pregnancy       ROS:  CONSTITUTIONAL:NEGATIVE for fever, chills, change in weight  INTEGUMENTARY/SKIN: NEGATIVE for worrisome rashes, moles or lesions  EYES: NEGATIVE for vision changes or irritation  ENT/MOUTH: sore throat  RESP:NEGATIVE for significant cough or SOB    OBJECTIVE:   /78  Pulse 67  Temp 99  F (37.2  C) (Tympanic)  Ht 5' 9\" (1.753 m)  Wt 158 lb (71.7 kg)  LMP 11/27/2017  SpO2 97%  Breastfeeding? No  BMI 23.33 kg/m2  GENERAL APPEARANCE: healthy, alert and no distress  EYES: EOMI,  PERRL, conjunctiva clear  HENT: ear canals and TM's normal.  Nose normal.  Pharynx erythematous with some exudate noted.  NECK: supple, non-tender to palpation, no adenopathy noted  RESP: lungs clear to auscultation - no rales, rhonchi or wheezes  CV: regular rates and rhythm, normal S1 S2, no murmur noted  ABDOMEN:  soft, nontender, no HSM or masses and bowel sounds normal  SKIN: no suspicious lesions or rashes        ASSESSMENT:    1. Acute pharyngitis, unspecified etiology    - cetirizine (ZYRTEC) 10 MG tablet; Take 1 tablet (10 mg) by mouth every evening  Dispense: 30 tablet; Refill: 1    PLAN:   See orders in epic.   Symptomatic " treat with gargles, lozenges, and OTC analgesic as needed. Follow-up with primary clinic if not improving over the next week.

## 2017-12-23 NOTE — MR AVS SNAPSHOT
"              After Visit Summary   12/23/2017    Belle Velez    MRN: [id number]           Patient Information     Date Of Birth          1980        Visit Information        Provider Department      12/23/2017 8:20 AM Waldo Longoria PA-C [facility]        Today's Diagnoses     Acute pharyngitis, unspecified etiology    -  1       Follow-ups after your visit        Who to contact     If you have questions or need follow up information about today's clinic visit or your schedule please contact [facility] directly at [phone].  Normal or non-critical lab and imaging results will be communicated to you by [employer], letter or phone within 4 business days after the clinic has received the results. If you do not hear from us within 7 days, please contact the clinic through [employer] or phone. If you have a critical or abnormal lab result, we will notify you by phone as soon as possible.  Submit refill requests through [employer] or call your pharmacy and they will forward the refill request to us. Please allow 3 business days for your refill to be completed.          Additional Information About Your Visit        MyChart Information     [employer] gives you secure access to your electronic health record. If you see a primary care provider, you can also send messages to your care team and make appointments. If you have questions, please call your primary care clinic.  If you do not have a primary care provider, please call [phone] and they will assist you.        Care EveryWhere ID     This is your Care EveryWhere ID. This could be used by other organizations to access your [place] medical records  [id number]        Your Vitals Were     Pulse Temperature Height Last Period Pulse Oximetry Breastfeeding?    67 99  F (37.2  C) (Tympanic) 5' 9\" (1.753 m) 11/27/2017 97% No    BMI (Body Mass Index)                   23.33 kg/m2            Blood Pressure from Last " 3 Encounters:   12/23/17 116/78   04/06/17 119/67   03/22/17 126/77    Weight from Last 3 Encounters:   12/23/17 158 lb (71.7 kg)   04/06/17 169 lb 11.2 oz (77 kg)   03/22/17 173 lb (78.5 kg)              Today, you had the following     No orders found for display         Today's Medication Changes          These changes are accurate as of: 12/23/17  9:26 AM.  If you have any questions, ask your nurse or doctor.               Start taking these medicines.        Dose/Directions    cetirizine 10 MG tablet   Commonly known as:  zyrTEC   Used for:  Acute pharyngitis, unspecified etiology   Started by:  Waldo Longoria, DEDRICK        Dose:  10 mg   Take 1 tablet (10 mg) by mouth every evening   Quantity:  30 tablet   Refills:  1            Where to get your medicines      These medications were sent to [facility] - [place], [place] - [address] AT [facility] Ketan Sosa  [address], [place] [address]     Phone:  [phone]     cetirizine 10 MG tablet                Primary Care Provider Office Phone # Fax #    Rosa Anaya Shital Page, APRN [facility] [phone]       [address] [facility] [address]  [facility] [address]        Equal Access to Services     [facility] KERON AH: Hadii colin villalpando hadasho Sosaraali, waaxda luqadaha, qaybta kaalmada adeegyada, waxjhon schaeffer hayga bacon . So [facility] [phone].    ATENCIÓN: Si habla español, tiene a canas disposición servicios gratuitos de asistencia lingüística. Llame al [phone].    We comply with applicable federal civil rights laws and [place] laws. We do not discriminate on the basis of race, color, national origin, age, disability, sex, sexual orientation, or gender identity.            Thank you!     Thank you for choosing [facility]  for your care. Our goal is always to provide you with excellent care. Hearing back from our patients is one way we can continue to improve our services. Please take a few minutes to  complete the written survey that you may receive in the mail after your visit with us. Thank you!             Your Updated Medication List - Protect others around you: Learn how to safely use, store and throw away your medicines at www.disposemymeds.org.          This list is accurate as of: 12/23/17  9:26 AM.  Always use your most recent med list.                   Brand Name Dispense Instructions for use Diagnosis    cetirizine 10 MG tablet    zyrTEC    30 tablet    Take 1 tablet (10 mg) by mouth every evening    Acute pharyngitis, unspecified etiology       PRENATAL VITAMIN PO      Take 1 tablet by mouth daily Reported on 4/6/2017    Encounter for supervision of normal first pregnancy in third trimester

## 2017-12-23 NOTE — NURSING NOTE
"Chief Complaint   Patient presents with     Urgent Care     URI     c/o nasal congestion and sore throat for 2 weeks       Initial /78  Pulse 67  Temp 99  F (37.2  C) (Tympanic)  Ht 5' 9\" (1.753 m)  Wt 158 lb (71.7 kg)  LMP 11/27/2017  SpO2 97%  Breastfeeding? No  BMI 23.33 kg/m2 Estimated body mass index is 23.33 kg/(m^2) as calculated from the following:    Height as of this encounter: 5' 9\" (1.753 m).    Weight as of this encounter: 158 lb (71.7 kg).  Medication Reconciliation: complete   Renetta Liriano MA      "

## 2017-12-27 ENCOUNTER — TELEPHONE (OUTPATIENT)
Dept: FAMILY MEDICINE | Facility: CLINIC | Age: 37
End: 2017-12-27

## 2017-12-27 NOTE — LETTER
December 27, 2017      Belle Velez  [address]  [facility] [address]        Dear Belle,    In order to ensure we are providing the best quality care, we have reviewed your chart and see that you are due for:    1. Pap smear    Please call the clinic at your earliest convenience to schedule an appointment.  If you have completed these please contact our office via phone or [employer]no[employer] to update our records.  We would like to know the date (approximately month and year), the result, and ideally where the procedure was performed.    Thank you for trusting us with your health care.      Sincerely,    Care Team for CHICA Singletary

## 2017-12-27 NOTE — TELEPHONE ENCOUNTER
Panel Management Review      Patient has the following on her problem list: None      Composite cancer screening  Chart review shows that this patient is due/due soon for the following Pap Smear  Summary:    Patient is due/failing the following:   PAP    Action needed:   Patient needs office visit for pap smear.    Type of outreach:    Sent letter.    Questions for provider review:    None                                                                                                                                    Jarad Rodgers MA     Chart routed to none.

## 2018-02-12 ENCOUNTER — TELEPHONE (OUTPATIENT)
Dept: FAMILY MEDICINE | Facility: CLINIC | Age: 38
End: 2018-02-12

## 2018-02-12 NOTE — TELEPHONE ENCOUNTER
Panel Management Review      Patient has the following on her problem list: None      Composite cancer screening  Chart review shows that this patient is due/due soon for the following Pap Smear  Summary:    Patient is due/failing the following:   PAP    Action needed:   Patient needs office visit for pap smear.    Type of outreach:    Phone, left message for patient to call back.  and Sent PayPerks[employer] message.    Questions for provider review:    None                                                                                                                                    Jarad Rodgers MA     Chart routed to none.

## 2018-11-06 ENCOUNTER — OFFICE VISIT (OUTPATIENT)
Dept: PSYCHOLOGY | Facility: CLINIC | Age: 38
End: 2018-11-06
Payer: COMMERCIAL

## 2018-11-06 DIAGNOSIS — F41.1 GENERALIZED ANXIETY DISORDER: Primary | ICD-10-CM

## 2018-11-06 PROCEDURE — 90834 PSYTX W PT 45 MINUTES: CPT | Performed by: COUNSELOR

## 2018-11-06 ASSESSMENT — ANXIETY QUESTIONNAIRES
1. FEELING NERVOUS, ANXIOUS, OR ON EDGE: MORE THAN HALF THE DAYS
3. WORRYING TOO MUCH ABOUT DIFFERENT THINGS: NEARLY EVERY DAY
2. NOT BEING ABLE TO STOP OR CONTROL WORRYING: MORE THAN HALF THE DAYS
IF YOU CHECKED OFF ANY PROBLEMS ON THIS QUESTIONNAIRE, HOW DIFFICULT HAVE THESE PROBLEMS MADE IT FOR YOU TO DO YOUR WORK, TAKE CARE OF THINGS AT HOME, OR GET ALONG WITH OTHER PEOPLE: VERY DIFFICULT
GAD7 TOTAL SCORE: 12
6. BECOMING EASILY ANNOYED OR IRRITABLE: MORE THAN HALF THE DAYS
7. FEELING AFRAID AS IF SOMETHING AWFUL MIGHT HAPPEN: NOT AT ALL
5. BEING SO RESTLESS THAT IT IS HARD TO SIT STILL: SEVERAL DAYS

## 2018-11-06 ASSESSMENT — PATIENT HEALTH QUESTIONNAIRE - PHQ9
SUM OF ALL RESPONSES TO PHQ QUESTIONS 1-9: 5
5. POOR APPETITE OR OVEREATING: MORE THAN HALF THE DAYS

## 2018-11-06 NOTE — MR AVS SNAPSHOT
MRN:[id number]                      After Visit Summary   11/6/2018    Belle Velez    MRN: [id number]           Visit Information        Provider Department      11/6/2018 1:00 PM [facility]      Your next 10 appointments already scheduled     Nov 13, 2018 10:00 AM CST   Return Visit with [facility]   [facility]    [facility]  [address]  [address]  [facility] [id number] [phone]              MyChart Information     [employer] gives you secure access to your electronic health record. If you see a primary care provider, you can also send messages to your care team and make appointments. If you have questions, please call your primary care clinic.  If you do not have a primary care provider, please call [phone] and they will assist you.        Care EveryWhere ID     This is your Care EveryWhere ID. This could be used by other organizations to access your [place] medical records  [id number]        Equal Access to Services     JAVIER CABRALES : Hadii colin phillipo Soraoul, waaxda luqadaha, qaybta kaalmada adeegyafabby, kan mustafa. So [facility] [phone].    ATENCIÓN: Si habla español, tiene a canas disposición servicios gratuitos de asistencia lingüística. Llame al [phone].    We comply with applicable federal civil rights laws and [place] laws. We do not discriminate on the basis of race, color, national origin, age, disability, sex, sexual orientation, or gender identity.

## 2018-11-06 NOTE — Clinical Note
Hi , Belle FosterAgustin completed initiated intake appt for therapy. She currently meets criteria for generalized anxiety disorder. Please contact me with any questions or concerns.   Thank you, Aury Rodgers MA, [facility]

## 2018-11-06 NOTE — PROGRESS NOTES
"               Progress Note - Initial Session    Client Name:  Belle Velez Date: 11/6/2018         Service Type: Individual      Session Start Time: 1:00p  Session End Time: 1:45p      Session Length: 38 - 52      Session #: 1     Attendees: Client attended alone         Diagnostic Assessment in progress.  Unable to complete documentation at the conclusion of the first session due to addressing moderate scores on CHRISTIANNE 7.      Mental Status Assessment:  Appearance:   Appropriate   Eye Contact:   Good   Psychomotor Behavior: Normal   Attitude:   Cooperative   Orientation:   All  Speech   Rate / Production: Normal    Volume:  Normal   Mood:    Normal \"Stressed\"   Affect:    Appropriate   Thought Content:  Clear  Rumination   Thought Form:  Coherent  Goal Directed  Logical   Insight:    Good       Safety Issues and Plan for Safety and Risk Management:  Client denies current fears or concerns for personal safety.  Client denies current or recent suicidal ideation or behaviors.  Client denies current or recent homicidal ideation or behaviors.  Client denies current or recent self injurious behavior or ideation.  Client denies other safety concerns.  A safety and risk management plan has not been developed at this time, however client was given the after-hours number / 911 should there be a change in any of these risk factors.  Client reports there are no firearms in the house.      Diagnostic Criteria:  A. Excessive anxiety and worry about a number of events or activities (such as work or school performance).   B. The person finds it difficult to control the worry.  C. Select 3 or more symptoms (required for diagnosis). Only one item is required in children.   - Restlessness or feeling keyed up or on edge.    - Being easily fatigued.    - Difficulty concentrating or mind going blank.    - Irritability.    - Muscle tension.    - Sleep disturbance (difficulty falling or staying asleep, or restless unsatisfying " sleep).   D. The focus of the anxiety and worry is not confined to features of an Axis I disorder.  E. The anxiety, worry, or physical symptoms cause clinically significant distress or impairment in social, occupational, or other important areas of functioning.   F. The disturbance is not due to the direct physiological effects of a substance (e.g., a drug of abuse, a medication) or a general medical condition (e.g., hyperthyroidism) and does not occur exclusively during a Mood Disorder, a Psychotic Disorder, or a Pervasive Developmental Disorder.      DSM5 Diagnoses: (Sustained by DSM5 Criteria Listed Above)  Diagnoses: PROVISIONAL 300.02 (F41.1) Generalized Anxiety Disorder  Psychosocial & Contextual Factors: Family stress, strain in marriage, work stress, close family and friends out of state  WHODAS 2.0 (12 item)            This questionnaire asks about difficulties due to health conditions. Health conditions  include  disease or illnesses, other health problems that may be short or long lasting,  injuries, mental health or emotional problems, and problems with alcohol or drugs.                     Think back over the past 30 days and answer these questions, thinking about how much  difficulty you had doing the following activities. For each question, please Elem only  one response.    S1 Standing for long periods such as 30 minutes? None =         1   S2 Taking care of household responsibilities? Mild =           2   S3 Learning a new task, for example, learning how to get to a new place? None =         1   S4 How much of a problem do you have joining community activities (for example, festivals, Pent[employer]ism or other activities) in the same way as anyone else can? None =         1   S5 How much have you been emotionally affected by your health problems? Mild =           2     In the past 30 days, how much difficulty did you have in:   S6 Concentrating on doing something for ten minutes? None =         1   S7  Walking a long distance such as a kilometer (or equivalent)? None =         1   S8 Washing your whole body? None =         1   S9 Getting dressed? None =         1   S10 Dealing with people you do not know? None =         1   S11 Maintaining a friendship? Mild =           2   S12 Your day to day work? Moderate =   3     H1 Overall, in the past 30 days, how many days were these difficulties present? Record number of days 20   H2 In the past 30 days, for how many days were you totally unable to carry out your usual activities or work because of any health condition? Record number of days 0   H3 In the past 30 days, not counting the days that you were totally unable, for how many days did you cut back or reduce your usual activities or work because of any health condition? Record number of days 5       Collateral Reports Completed:  Routed note to PCP      PLAN: (Homework, other):  Client stated that she may follow up for ongoing services with [facility]. Continue to assess anxiety symptoms and complete intake.      Aury Rodgers, [facility]

## 2018-11-07 ASSESSMENT — ANXIETY QUESTIONNAIRES: GAD7 TOTAL SCORE: 12

## 2018-11-09 ENCOUNTER — ALLIED HEALTH/NURSE VISIT (OUTPATIENT)
Dept: NURSING | Facility: CLINIC | Age: 38
End: 2018-11-09
Payer: COMMERCIAL

## 2018-11-09 DIAGNOSIS — Z23 NEED FOR PROPHYLACTIC VACCINATION AND INOCULATION AGAINST INFLUENZA: Primary | ICD-10-CM

## 2018-11-09 PROCEDURE — 99207 ZZC NO CHARGE NURSE ONLY: CPT

## 2018-11-09 PROCEDURE — 90686 IIV4 VACC NO PRSV 0.5 ML IM: CPT

## 2018-11-09 PROCEDURE — 90471 IMMUNIZATION ADMIN: CPT

## 2018-11-09 NOTE — MR AVS SNAPSHOT
After Visit Summary   11/9/2018    Belle Velez    MRN: [id number]           Patient Information     Date Of Birth          1980        Visit Information        Provider Department      11/9/2018 9:25 AM CARE COORDINATOR [facility]        Today's Diagnoses     Need for prophylactic vaccination and inoculation against influenza    -  1       Follow-ups after your visit        Your next 10 appointments already scheduled     Nov 13, 2018 10:00 AM CST   Return Visit with Aury Rodgers [facility] ([facility]  [address]  [facility] [address] [phone]              Who to contact     If you have questions or need follow up information about today's clinic visit or your schedule please contact [facility] directly at [phone].  Normal or non-critical lab and imaging results will be communicated to you by [employer], letter or phone within 4 business days after the clinic has received the results. If you do not hear from us within 7 days, please contact the clinic through [employer] or phone. If you have a critical or abnormal lab result, we will notify you by phone as soon as possible.  Submit refill requests through [employer] or call your pharmacy and they will forward the refill request to us. Please allow 3 business days for your refill to be completed.          Additional Information About Your Visit        MyChart Information     [employer] gives you secure access to your electronic health record. If you see a primary care provider, you can also send messages to your care team and make appointments. If you have questions, please call your primary care clinic.  If you do not have a primary care provider, please call [phone] and they will assist you.        Care EveryWhere ID     This is your Care EveryWhere ID. This could be used by other organizations  to access your [place] medical records  [id number]         Blood Pressure from Last 3 Encounters:   12/23/17 116/78   04/06/17 119/67   03/22/17 126/77    Weight from Last 3 Encounters:   12/23/17 158 lb (71.7 kg)   04/06/17 169 lb 11.2 oz (77 kg)   03/22/17 173 lb (78.5 kg)              We Performed the Following     FLU VACCINE, SPLIT VIRUS, IM (QUADRIVALENT) [33288]- >3 YRS     Vaccine Administration, Initial [67205]        Primary Care Provider Office Phone # Fax #    Rosa Isaacs Page, APRN [facility] [phone]       [address] [facility] [address]  [facility] [address]        Equal Access to Services     JAVIER CABRALES : Hadii aad ku hadasho Soraoul, waaxda luqadaha, qaybta kaalmada adeegyada, kan bacon . So [facility] [phone].    ATENCIÓN: Si habla español, tiene a canas disposición servicios gratuitos de asistencia lingüística. Llame al [phone].    We comply with applicable federal civil rights laws and [place] laws. We do not discriminate on the basis of race, color, national origin, age, disability, sex, sexual orientation, or gender identity.            Thank you!     Thank you for choosing [facility]  for your care. Our goal is always to provide you with excellent care. Hearing back from our patients is one way we can continue to improve our services. Please take a few minutes to complete the written survey that you may receive in the mail after your visit with us. Thank you!             Your Updated Medication List - Protect others around you: Learn how to safely use, store and throw away your medicines at www.disposemymeds.org.          This list is accurate as of 11/9/18 10:19 AM.  Always use your most recent med list.                   Brand Name Dispense Instructions for use Diagnosis    cetirizine 10 MG tablet    zyrTEC    30 tablet    Take 1 tablet (10 mg) by mouth every evening    Acute pharyngitis, unspecified etiology        PRENATAL VITAMIN PO      Take 1 tablet by mouth daily Reported on 4/6/2017    Encounter for supervision of normal first pregnancy in third trimester

## 2018-11-09 NOTE — PROGRESS NOTES

## 2018-11-14 ENCOUNTER — TELEPHONE (OUTPATIENT)
Dept: PSYCHOLOGY | Facility: CLINIC | Age: 38
End: 2018-11-14

## 2018-11-30 ENCOUNTER — OFFICE VISIT (OUTPATIENT)
Dept: PSYCHOLOGY | Facility: CLINIC | Age: 38
End: 2018-11-30
Payer: COMMERCIAL

## 2018-11-30 DIAGNOSIS — F41.1 GENERALIZED ANXIETY DISORDER: Primary | ICD-10-CM

## 2018-11-30 PROCEDURE — 90834 PSYTX W PT 45 MINUTES: CPT | Performed by: COUNSELOR

## 2018-11-30 NOTE — MR AVS SNAPSHOT
MRN:[id number]                      After Visit Summary   11/30/2018    Belle Velez    MRN: [id number]           Visit Information        Provider Department      11/30/2018 10:00 AM Vishal Fangrichie [facility]      Your next 10 appointments already scheduled     Dec 10, 2018 11:00 AM CST   Return Visit with Aury Rodgers [facility] ([facility]  [address]  [facility] [address]   [phone]            Dec 17, 2018 10:00 AM CST   Return Visit with Aury Rodgers [facility] ([facility])    [facility]  [address]  [facility] [id number] [phone]            Jan 11, 2019  9:00 AM CST   Return Visit with Aury Rodgers [facility] ([facility]  [address]  [facility] [address] [phone]            Jan 25, 2019  9:00 AM CST   Return Visit with Aury Rodgers [facility] ([facility]  [address]  [facility] [address] [phone]              MyChart Information     [employer] gives you secure access to your electronic health record. If you see a primary care provider, you can also send messages to your care team and make appointments. If you have questions, please call your primary care clinic.  If you do not have a primary care provider, please call [phone] and they will assist you.        Care EveryWhere ID     This is your Care EveryWhere ID. This could be used by other organizations to access your [place] medical records  [id number]        Equal Access to Services     JAVIER CABRALES : Evangelista Zheng, linda kimble, kan ross. So [facility] [phone].    ATENCIÓN: Si  habla andrews, tiene a canas disposición servicios gratuitos de asistencia lingüística. Llame al [phone].    We comply with applicable federal civil rights laws and [place] laws. We do not discriminate on the basis of race, color, national origin, age, disability, sex, sexual orientation, or gender identity.

## 2018-11-30 NOTE — Clinical Note
Belle Cordova completed intake appt for therapy. We will be working on anxiety. Please contact me with any questions or concerns.   Thank you, Aury Rodgers MA, [facility]

## 2018-12-03 ENCOUNTER — OFFICE VISIT (OUTPATIENT)
Dept: FAMILY MEDICINE | Facility: CLINIC | Age: 38
End: 2018-12-03
Payer: COMMERCIAL

## 2018-12-03 VITALS
OXYGEN SATURATION: 98 % | BODY MASS INDEX: 26.06 KG/M2 | HEART RATE: 66 BPM | DIASTOLIC BLOOD PRESSURE: 69 MMHG | SYSTOLIC BLOOD PRESSURE: 114 MMHG | TEMPERATURE: 98.3 F | WEIGHT: 176.5 LBS

## 2018-12-03 DIAGNOSIS — J01.90 ACUTE NON-RECURRENT SINUSITIS, UNSPECIFIED LOCATION: Primary | ICD-10-CM

## 2018-12-03 DIAGNOSIS — R09.82 POST-NASAL DRAINAGE: ICD-10-CM

## 2018-12-03 PROCEDURE — 99214 OFFICE O/P EST MOD 30 MIN: CPT | Performed by: FAMILY MEDICINE

## 2018-12-03 RX ORDER — FLUTICASONE PROPIONATE 50 MCG
2 SPRAY, SUSPENSION (ML) NASAL DAILY
Qty: 1 BOTTLE | Refills: 0 | Status: SHIPPED | OUTPATIENT
Start: 2018-12-03 | End: 2020-10-09

## 2018-12-03 NOTE — PROGRESS NOTES
SUBJECTIVE:   Belle Velez is a 37 year old female who presents to clinic today for the following health issues:      Acute Illness     Onset: 8 weeks ago    Fever: YES    Chills/Sweats: YES- weeks ago     Headache (location?): YES    Sinus Pressure:YES    Conjunctivitis:  no    Ear Pain: YES: bilateral, only popping no pain    Rhinorrhea: YES    Congestion: YES    Sore Throat: YES     Cough: YES-productive of green sputum    Wheeze: no     Decreased Appetite: no     Nausea: no     Vomiting: no    Diarrhea:  no    Dysuria/Freq.: no     Fatigue/Achiness: YES- fatigue     Sick/Strep Exposure: no      Therapies Tried and outcome: ibuprofen PRN     On 11/12/2018 pt had a stomach bug, now resolved.       Problem list and histories reviewed & adjusted, as indicated.  Additional history: as documented    Labs reviewed in EPIC    Reviewed and updated as needed this visit by clinical staff       Reviewed and updated as needed this visit by Provider         ROS:  Constitutional, HEENT, cardiovascular, pulmonary, gi and gu systems are negative, except as otherwise noted.    OBJECTIVE:     /69  Pulse 66  Temp 98.3  F (36.8  C) (Oral)  Wt 176 lb 8 oz (80.1 kg)  LMP 11/23/2018 (Exact Date)  SpO2 98%  BMI 26.06 kg/m2  Body mass index is 26.06 kg/(m^2).  GENERAL: healthy, alert and no distress  EYES: Eyes grossly normal to inspection,  HENT: ear canals and TM's normal, nose and mouth without ulcers or lesions  NECK: no adenopathy  RESP: lungs clear to auscultation - no rales, rhonchi or wheezes  CV: regular rate and rhythm, normal S1 S2    Diagnostic Test Results:  none     ASSESSMENT/PLAN:       1. Acute non-recurrent sinusitis, unspecified location  - amoxicillin-clavulanate (AUGMENTIN) 875-125 MG tablet; Take 1 tablet by mouth 2 times daily for 10 days  Dispense: 20 tablet; Refill: 0  - advised below   Sudafed once daily  If symptoms are not improving - mainly congestion and drainage then please start Flonase  2 nasal sprays in each nostril  Augmentin one tablet twice daily for 10 days  Call or my chart me if your symptoms are not improving in one week. I'll order a chest xray for further evaluation.     2. Post-nasal drainage  - fluticasone (FLONASE) 50 MCG/ACT nasal spray; Spray 2 sprays into both nostrils daily  Dispense: 1 Bottle; Refill: 0        Deqa Charito Aguilar MD  [facility]

## 2018-12-03 NOTE — PATIENT INSTRUCTIONS
Sudafed once daily  If symptoms are not improving - mainly congestion and drainage then please start Flonase 2 nasal sprays in each nostril  Augmentin one tablet twice daily for 10 days  Call or my chart me if your symptoms are not improving in one week. I'll order a chest xray for further evaluation.

## 2018-12-03 NOTE — MR AVS SNAPSHOT
After Visit Summary   12/3/2018    Belle Velez    MRN: [id number]           Patient Information     Date Of Birth          1980        Visit Information        Provider Department      12/3/2018 3:40 PM Homar Aguilar MD [facility]        Today's Diagnoses     Acute non-recurrent sinusitis, unspecified location    -  1    Post-nasal drainage          Care Instructions    Sudafed once daily  If symptoms are not improving - mainly congestion and drainage then please start Flonase 2 nasal sprays in each nostril  Augmentin one tablet twice daily for 10 days  Call or my chart me if your symptoms are not improving in one week. I'll order a chest xray for further evaluation.           Follow-ups after your visit        Follow-up notes from your care team     Return in about 7 days (around 12/10/2018) for sympotms are not improving.      Your next 10 appointments already scheduled     Dec 10, 2018 11:00 AM CST   Return Visit with Aury Rodgers [facility]  [address]  [facility] [id number] [phone]            Dec 17, 2018 10:00 AM CST   Return Visit with Aury Rodgers [facility]  [address]  [facility] [address] [phone]            Jan 11, 2019  9:00 AM CST   Return Visit with Aury Rodgers [facility]  [address]  [facility] [address]   [phone]            Jan 25, 2019  9:00 AM CST   Return Visit with Aury Rodgers [facility]  [address]  [facility] [address] [phone]              Who to contact     If you have questions or need follow up information about today's clinic visit or your  schedule please contact [facility] directly at [phone].  Normal or non-critical lab and imaging results will be communicated to you by MyChart, letter or phone within 4 business days after the clinic has received the results. If you do not hear from us within 7 days, please contact the clinic through [employer] or phone. If you have a critical or abnormal lab result, we will notify you by phone as soon as possible.  Submit refill requests through [employer] or call your pharmacy and they will forward the refill request to us. Please allow 3 business days for your refill to be completed.          Additional Information About Your Visit        [employer] Information     [employer] gives you secure access to your electronic health record. If you see a primary care provider, you can also send messages to your care team and make appointments. If you have questions, please call your primary care clinic.  If you do not have a primary care provider, please call [phone] and they will assist you.        Care EveryWhere ID     This is your Care EveryWhere ID. This could be used by other organizations to access your [place] medical records  [id number]        Your Vitals Were     Pulse Temperature Last Period Pulse Oximetry BMI (Body Mass Index)       66 98.3  F (36.8  C) (Oral) 11/23/2018 (Exact Date) 98% 26.06 kg/m2        Blood Pressure from Last 3 Encounters:   12/03/18 114/69   12/23/17 116/78   04/06/17 119/67    Weight from Last 3 Encounters:   12/03/18 176 lb 8 oz (80.1 kg)   12/23/17 158 lb (71.7 kg)   04/06/17 169 lb 11.2 oz (77 kg)              Today, you had the following     No orders found for display         Today's Medication Changes          These changes are accurate as of 12/3/18  3:58 PM.  If you have any questions, ask your nurse or doctor.               Start taking these medicines.        Dose/Directions    amoxicillin-clavulanate 875-125 MG tablet   Commonly known as:  AUGMENTIN   Used  for:  Acute non-recurrent sinusitis, unspecified location   Started by:  Homar Aguilar MD        Dose:  1 tablet   Take 1 tablet by mouth 2 times daily for 10 days   Quantity:  20 tablet   Refills:  0       fluticasone 50 MCG/ACT nasal spray   Commonly known as:  FLONASE   Used for:  Post-nasal drainage   Started by:  Homar Aguilar MD        Dose:  2 spray   Spray 2 sprays into both nostrils daily   Quantity:  1 Bottle   Refills:  0            Where to get your medicines      These medications were sent to [facility] [place], [place] - [address]  [address], [facility] [address]     Phone:  [phone]     amoxicillin-clavulanate 875-125 MG tablet    fluticasone 50 MCG/ACT nasal spray                Primary Care Provider Office Phone # Fax #    Rosa Dela Cruz, APRN [facility] [phone]       [address] [place]  [facility] [address]        Equal Access to Services     [facility] HospitalDEUCE AH: Hadii aad ku hadasho Soomaali, waaxda luqadaha, qaybta kaalmada adeegyada, waxay idiin hayaan yari bacon . So [facility] [phone].    ATENCIÓN: Si habla español, tiene a canas disposición servicios gratuitos de asistencia lingüística. [facility] [phone].    We comply with applicable federal civil rights laws and [place] laws. We do not discriminate on the basis of race, color, national origin, age, disability, sex, sexual orientation, or gender identity.            Thank you!     Thank you for choosing [facility]  for your care. Our goal is always to provide you with excellent care. Hearing back from our patients is one way we can continue to improve our services. Please take a few minutes to complete the written survey that you may receive in the mail after your visit with us. Thank you!             Your Updated Medication List - Protect others around you: Learn how to safely use, store and throw away your medicines at www.disposemymeds.org.           This list is accurate as of 12/3/18  3:58 PM.  Always use your most recent med list.                   Brand Name Dispense Instructions for use Diagnosis    amoxicillin-clavulanate 875-125 MG tablet    AUGMENTIN    20 tablet    Take 1 tablet by mouth 2 times daily for 10 days    Acute non-recurrent sinusitis, unspecified location       cetirizine 10 MG tablet    zyrTEC    30 tablet    Take 1 tablet (10 mg) by mouth every evening    Acute pharyngitis, unspecified etiology       fluticasone 50 MCG/ACT nasal spray    FLONASE    1 Bottle    Spray 2 sprays into both nostrils daily    Post-nasal drainage       PRENATAL VITAMIN PO      Take 1 tablet by mouth daily Reported on 4/6/2017    Encounter for supervision of normal first pregnancy in third trimester

## 2018-12-04 NOTE — PROGRESS NOTES
"                                                                                                                                                                      Adult Intake Structured Interview  Standard Diagnostic Assessment      CLIENT'S NAME: Belle Velez  MRN:   [id number]  :   1980  ACCT. NUMBER: [id number]  DATE OF SERVICE: 18      Identifying Information:  Client is a 37 year old, ,  female. Client was referred for counseling by self. Client is currently employed full time. Client attended the session alone.       Client's Statement of Presenting Concern:  Client reports the reason for seeking therapy at this time as \"feeling overwhelmed, stressed with most facets of my life, including new/demanding job, motherhood, home ownership, financial obligation, my health, change in family responsibility/caring for sick father in law, marriage/intimacy \".  Client stated that her symptoms have resulted in the following functional impairments: childcare / parenting, management of the household and or completion of tasks, organization, relationship(s), self-care, social interactions and work / vocational responsibilities.      History of Presenting Concern:  Client reports that these problem(s) began in law school and again became more difficulty after having son. Reports feeling more anxiety overall the last decade of life. Client has attempted to resolve these concerns in the past through making lists, delegating what I can and talk to family and friends. Client reports that other professional(s) are not involved in providing support / services.       Social History:  Client reported she grew up in [facility] - moved to [place] . They were the first born of 3 children. This is an intact family and parents remain . Client reported that her childhood was \"I lived near a lot of family in [place] town and spent much of my life with them, and was very " "close with them I was very in school and community activity, and had a lot of support and love from my parents\". Identified as an easy, responsible, and independent child. Client described her current relationships with family of origin as close.    Client reported a history of 2 committed relationships. Client has been  for 5 years, together for 10 years. Client reported having 1 child. Client identified some stable and meaningful social connections. Client reported that she has not been involved with the legal system. Client's highest education level was graduate school - [employer]. Client did not identify any learning problems. There are no ethnic, cultural or Hindu factors that may be relevant for therapy. Client identified her preferred language to be English. Client reported she does not need the assistance of an  or other support involved in therapy. Modifications will not be used to assist communication in therapy. Client did not serve in the .     Client reports family history includes Other Cancer in her maternal grandmother; Substance Abuse in her maternal grandfather.      Mental Health History:  Client reported the following biological family members or relatives with mental health issues: Father experienced ADHD, Maternal Grandfather experienced Depression, Aunt experienced Depression and Another Aunt experienced Bipolar Disorder.  Client exhibited symptoms of Anxiety but had not been formally diagnosed.  Client has not received mental health services in the past.  Hospitalizations: None.  Client is not currently receiving any mental health services.      Chemical Health History:  Client reported the following biological family members or relatives with chemical health issues: Aunt reportedly used alcohol & Maternal Grandfather reportedly used alcohol. Client has not received chemical dependency treatment in the past. Client is not currently receiving any chemical dependency " treatment. Client reported the following problems as a result of drinking: sleep, diet, health.      Client Reports:  Client reports using alcohol 3-4 times per week and has 1-2 drinks at a time. Client first started drinking 2009.  Client denies using tobacco.  Client denies using marijuana.  Client reports using caffeine 3-4 times per day and drinks 1 at a time. Client started using caffeine at age 16.  Client denies using street drugs.  Client denies the non-medical use of prescription or over the counter drugs.    CAGE: C     Patient felt they ought to CUT down on your drinking (or drug use). *for caffeine   Based on the negative Cage-Aid score and clinical interview there are not indications of drug or alcohol abuse.    Discussed the general effects of drugs and alcohol on health and well-being. Therapist gave client printed information about the effects of chemical use on her health and well being.      Significant Losses / Trauma / Abuse / Neglect Issues:  There are indications or report of significant loss, trauma, abuse or neglect issues related to: father in law dx with brain tumor and  and his brother are primary care givers.    Issues of possible neglect are not present.      Medical Issues:  Client has not had a physical exam to rule out medical causes for current symptoms. Date of last physical exam was within the past year. Client was encouraged to follow up with PCP if symptoms were to develop. The client has a [facility] Provider, who is named Rosa Dela Cruz. The client reports not having a psychiatrist. Client reports the following current medical concerns: tension headaches and depression before period. The client reports the presence of chronic or episodic pain in the form of shoulder, neck, occasional headaches. The pain level is moderate and has a frequency of ongoing. There are significant nutritional concerns re: weight and emotional eating.     Client reports  current meds as:   Current Outpatient Prescriptions   Medication Sig     amoxicillin-clavulanate (AUGMENTIN) 875-125 MG tablet Take 1 tablet by mouth 2 times daily for 10 days     cetirizine (ZYRTEC) 10 MG tablet Take 1 tablet (10 mg) by mouth every evening (Patient not taking: Reported on 12/3/2018)     fluticasone (FLONASE) 50 MCG/ACT nasal spray Spray 2 sprays into both nostrils daily     Prenatal Vit-Fe Fumarate-FA (PRENATAL VITAMIN PO) Take 1 tablet by mouth daily Reported on 4/6/2017     No current facility-administered medications for this visit.        Client Allergies:  No Known Allergies      Medical History:  Past Medical History:   Diagnosis Date     Anxiety      NO ACTIVE PROBLEMS      Skin lesion        Medication Adherence:  N/A - Client does not have prescribed psychiatric medications.    Client was provided recommendation to follow-up with prescribing physician.      Mental Status Assessment:  Appearance:   Appropriate   Eye Contact:   Good   Psychomotor Behavior: Normal   Attitude:   Cooperative   Orientation:   All  Speech   Rate / Production: Normal    Volume:  Normal   Mood:    Normal Some anxiousness  Affect:    Appropriate   Thought Content:  Clear   Thought Form:  Coherent  Goal Directed  Logical   Insight:    Good       Review of Symptoms:  Depression: Interest Energy Appetite Psychomotor slowing or agitation Ruminations Irritability Mood  Stephanie:  No symptoms  Psychosis: No symptoms  Anxiety: Worries Nervousness  Panic:  No symptoms  Post Traumatic Stress Disorder: No symptoms  Obsessive Compulsive Disorder: No symptoms  Eating Disorder: No symptoms  Oppositional Defiant Disorder: No symptoms  ADD / ADHD: No symptoms  Conduct Disorder: No symptoms      Safety Assessment:  History of Safety Concerns:   Client denied a history of suicidal ideation.    Client denied a history of suicide attempts.    Client denied a history of homicidal ideation.    Client denied a history of self-injurious  ideation and behaviors.    Client denied a history of personal safety concerns.    Client denied a history of assaultive behaviors.      Current Safety Concerns:  Client denies current suicidal ideation.    Client denies current homicidal ideation and behaviors.  Client denies current self-injurious ideation and behaviors.    Client denies current concerns for personal safety.      Client reports there are no firearms in the house.       Plan for Safety and Risk Management:  A safety and risk management plan has not been developed at this time, however client was given the after-hours number / 911 should there be a change in any of these risk factors.      Client's Strengths and Limitations:  Client identified the following strengths or resources that will help her succeed in counseling: good at following a plan, accountability, optimistic, receptive to feedback/teachable, self-aware, friends, and family. Client identified the following supports: family and friends. Things that may interfere with the client's success in counseling include: lack of local support, lack of partnership from , very little discretionary income.      Diagnostic Criteria:  A. Excessive anxiety and worry about a number of events or activities (such as work or school performance).   B. The person finds it difficult to control the worry.  C. Select 3 or more symptoms (required for diagnosis). Only one item is required in children.   - Restlessness or feeling keyed up or on edge.    - Being easily fatigued.    - Difficulty concentrating or mind going blank.    - Irritability.    - Muscle tension.    - Sleep disturbance (difficulty falling or staying asleep, or restless unsatisfying sleep).   D. The focus of the anxiety and worry is not confined to features of an Axis I disorder.  E. The anxiety, worry, or physical symptoms cause clinically significant distress or impairment in social, occupational, or other important areas of  functioning.   F. The disturbance is not due to the direct physiological effects of a substance (e.g., a drug of abuse, a medication) or a general medical condition (e.g., hyperthyroidism) and does not occur exclusively during a Mood Disorder, a Psychotic Disorder, or a Pervasive Developmental Disorder.      Functional Status:  Client's symptoms have caused reduced functional status in the following areas: Activities of Daily Living, Occupational / Vocational, & Social / Relational.      DSM5 Diagnoses: (Sustained by DSM5 Criteria Listed Above)  Diagnoses: PROVISIONAL 300.02 (F41.1) Generalized Anxiety Disorder; RULE OUT Depression (client does not identify as having depression)  Psychosocial & Contextual Factors: Family stress, strain in marriage, work stress, close family and friends out of state  WHODAS 2.0 (12 item)                          This questionnaire asks about difficulties due to health conditions. Health conditions                   include                        disease or illnesses, other health problems that may be short or long lasting,                    injuries, mental health or emotional problems, and problems with alcohol or drugs.                              Think back over the past 30 days and answer these questions, thinking about how much              difficulty you had doing the following activities. For each question, please [place] only                   one response.     S1 Standing for long periods such as 30 minutes? None =         1   S2 Taking care of household responsibilities? Mild =           2   S3 Learning a new task, for example, learning how to get to a new place? None =         1   S4 How much of a problem do you have joining community activities (for example, festivals, Evangelical or other activities) in the same way as anyone else can? None =         1   S5 How much have you been emotionally affected by your health problems? Mild =           2           In the past 30  days, how much difficulty did you have in:   S6 Concentrating on doing something for ten minutes? None =         1   S7 Walking a long distance such as a kilometer (or equivalent)? None =         1   S8 Washing your whole body? None =         1   S9 Getting dressed? None =         1   S10 Dealing with people you do not know? None =         1   S11 Maintaining a friendship? Mild =           2   S12 Your day to day work? Moderate =   3      H1 Overall, in the past 30 days, how many days were these difficulties present? Record number of days 20   H2 In the past 30 days, for how many days were you totally unable to carry out your usual activities or work because of any health condition? Record number of days 0   H3 In the past 30 days, not counting the days that you were totally unable, for how many days did you cut back or reduce your usual activities or work because of any health condition? Record number of days 5        Attendance Agreement:  Client has signed Attendance Agreement:Yes      Collaboration:  The client is receiving treatment / structured support from the following professional(s) / service and treatment. Collaboration will be initiated with: primary care physician.        Preliminary Treatment Plan:  The client reports no currently identified Buddhism, ethnic or cultural issues relevant to therapy.     services are not indicated.    Modifications to assist communication are not indicated.    The concerns identified by the client will be addressed in therapy.    Initial Treatment will focus on: Anxiety & Adjustment Difficulties related to: new job, martial stress, family stress, parenting.    As a preliminary treatment goal, client will experience a reduction in anxiety, will develop more effective coping skills to manage anxiety symptoms, will develop healthy cognitive patterns and beliefs and will increase ability to function adaptively and will develop coping/problem-solving skills to  facilitate more adaptive adjustment.    The focus of initial interventions will be to alleviate anxiety, facilitate appropriate expression of feelings, increase ability to function adaptively, increase coping skills, provide homework to reinforce skill development, teach CBT skills, teach communication skills, teach conflict management skills, teach DBT skills, teach distress tolerance skills, teach emotional regulation, teach mindfulness skills, teach problem-solving skills, teach relaxation strategies, teach social skills and teach stress mangement techniques.    Referral to another professional/service is not indicated at this time.    A Release of Information is not needed at this time.    Report to child / adult protection services was NA.    Client will have access to their [facility]' medical record.      KLAUS Abrams  December 4, 2018

## 2018-12-10 ENCOUNTER — OFFICE VISIT (OUTPATIENT)
Dept: PSYCHOLOGY | Facility: CLINIC | Age: 38
End: 2018-12-10
Payer: COMMERCIAL

## 2018-12-10 DIAGNOSIS — F41.1 GENERALIZED ANXIETY DISORDER: Primary | ICD-10-CM

## 2018-12-10 PROCEDURE — 90834 PSYTX W PT 45 MINUTES: CPT | Performed by: COUNSELOR

## 2018-12-10 ASSESSMENT — ANXIETY QUESTIONNAIRES
2. NOT BEING ABLE TO STOP OR CONTROL WORRYING: SEVERAL DAYS
GAD7 TOTAL SCORE: 4
5. BEING SO RESTLESS THAT IT IS HARD TO SIT STILL: NOT AT ALL
6. BECOMING EASILY ANNOYED OR IRRITABLE: NOT AT ALL
7. FEELING AFRAID AS IF SOMETHING AWFUL MIGHT HAPPEN: NOT AT ALL
IF YOU CHECKED OFF ANY PROBLEMS ON THIS QUESTIONNAIRE, HOW DIFFICULT HAVE THESE PROBLEMS MADE IT FOR YOU TO DO YOUR WORK, TAKE CARE OF THINGS AT HOME, OR GET ALONG WITH OTHER PEOPLE: SOMEWHAT DIFFICULT
3. WORRYING TOO MUCH ABOUT DIFFERENT THINGS: SEVERAL DAYS
1. FEELING NERVOUS, ANXIOUS, OR ON EDGE: SEVERAL DAYS

## 2018-12-10 ASSESSMENT — PATIENT HEALTH QUESTIONNAIRE - PHQ9: 5. POOR APPETITE OR OVEREATING: SEVERAL DAYS

## 2018-12-10 NOTE — PROGRESS NOTES
Progress Note    Client Name: Blele Velez  Date: 12/10/2018         Service Type: Individual      Session Start Time: 11:05a  Session End Time: 11:50a      Session Length: 45 minutes     Session #: 3     Attendees: Client attended alone    Treatment Plan Last Reviewed: In progress  PHQ-9 / CHRISTIANNE-7 : 5 & 4       DATA      Progress Since Last Session (Related to Symptoms / Goals / Homework):   Symptoms: Some improvement in acute anxiousness, see Epic for CHRISTIANNE 7 updates    Homework: Client will follow up with  re: to dos for father in law.      Episode of Care Goals: Minimal progress - PREPARATION (Decided to change - considering how); Intervened by negotiating a change plan and determining options / strategies for behavior change, identifying triggers, exploring social supports, and working towards setting a date to begin behavior change     Current / Ongoing Stressors and Concerns:   Ongoing family and work stress; described working to improve communication, but at times not connecting with  due to fear of how he will react and therefore coming up with a different plan from ; ongoing ambivalence about how to support  and father in law.      Treatment Objective(s) Addressed in This Session:   In progress     Intervention:   Motivational Interviewing: open-ended questions, affirmations, reflections and emphasizing personal control and choices, challenge sustain talk, evoke change talk, explore ambivalence, gauge confidence for change   DBT: teach interpersonal effective skills  CBT: identify patterns of anxious thinking and behavior in work and interpersonal relationships, reinforce proactive problem solving skills, teach effective communication skills        ASSESSMENT: Current Emotional / Mental Status (status of significant symptoms):   Risk status (Self / Other harm or suicidal ideation)   Client denies current fears or concerns for  personal safety.   Client denies current or recent suicidal ideation or behaviors.   Client denies current or recent homicidal ideation or behaviors.   Client denies current or recent self injurious behavior or ideation.   Client denies other safety concerns.   Client Client reports there has been no change in risk factors since their last session.     Client Client reports there has been no change in protective factors since their last session.     A safety and risk management plan has not been developed at this time, however client was given the after-hours number / 911 should there be a change in any of these risk factors.     Appearance:   Appropriate    Eye Contact:   Good    Psychomotor Behavior: Normal    Attitude:   Cooperative    Orientation:   All   Speech    Rate / Production: Normal     Volume:  Normal    Mood:    Anxious    Affect:    Appropriate    Thought Content:  Clear    Thought Form:  Coherent  Goal Directed  Logical    Insight:    Good      Medication Review:   No current psychiatric medications prescribed     Medication Compliance:   NA     Changes in Health Issues:   None reported     Chemical Use Review:   Substance Use: Chemical use reviewed, no active concerns identified      Tobacco Use: No current tobacco use      Collateral Reports Completed:   Not Applicable      PLAN: (Client Tasks / Therapist Tasks / Other)  Start addressing issues identified on treatment plan.         Aury Rodgers [facility]                                                         ________________________________________________________________________    Treatment Plan    Client's Name: Belle Velez  YOB: 1980    Date: 12/10/2018    Diagnoses: PROVISIONAL 300.02 (F41.1) Generalized Anxiety Disorder; RULE OUT Depression (client does not identify as having depression)  Psychosocial & Contextual Factors: Family stress, strain in marriage, work stress, close family and friends out of state  WHODAS:  17    Referral / Collaboration:  Referral to another professional/service is not indicated at this time.    Anticipated number of session or this episode of care: 12      MeasurableTreatment Goal(s) related to diagnosis / functional impairment(s)  Goal 1: Client will better manage anxiety as evidenced by decreased score on CHRISTIANNE 7 from 12 (moderate) to 5 or less (mild).    I will know I've met my goal when I can maintain work satisfaction and have improved communication with my  and his family.      Objective #A (Client Action)    Client will practice effective communication with  weekly and be able to identify her role in 's family.  Status: New - Date: 12/10/2018     Intervention(s)  Therapist will assign homework of skill practice; provide educational materials on interpersonal effectiveness; role-play conflict management and communication skills; teach the client how to perform a behavioral chain analysis.    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  Status: New - Date: 12/10/2018     Intervention(s)  Therapist will assign homework of awareness development; provide educational materials on distress tolerance skills; teach how to choose an intensity for asking for help or saying  no .      Client has reviewed and agreed to the above plan.      KLAUS Abrams  December 10, 2018

## 2018-12-11 ASSESSMENT — ANXIETY QUESTIONNAIRES: GAD7 TOTAL SCORE: 4

## 2018-12-17 ENCOUNTER — OFFICE VISIT (OUTPATIENT)
Dept: PSYCHOLOGY | Facility: CLINIC | Age: 38
End: 2018-12-17
Payer: COMMERCIAL

## 2018-12-17 DIAGNOSIS — F41.1 GENERALIZED ANXIETY DISORDER: Primary | ICD-10-CM

## 2018-12-17 PROCEDURE — 90834 PSYTX W PT 45 MINUTES: CPT | Performed by: COUNSELOR

## 2018-12-17 NOTE — PROGRESS NOTES
Progress Note    Client Name: Belle Velez  Date: 12/17/2018         Service Type: Individual      Session Start Time: 10:10a  Session End Time: 10:50a      Session Length: 40 minutes     Session #: 4     Attendees: Client attended alone    Treatment Plan Last Reviewed: .td  PHQ-9 / CHRISTIANNE-7 : 5 & 4       DATA      Progress Since Last Session (Related to Symptoms / Goals / Homework):   Symptoms: Some improvement in acute anxiousness, see Epic for CHRISTIANNE 7 updates    Homework: Client will follow up with  re: to dos for father in law - not completed and continued. Client will also work to set limits at work      Episode of Care Goals: Minimal progress - PREPARATION (Decided to change - considering how); Intervened by negotiating a change plan and determining options / strategies for behavior change, identifying triggers, exploring social supports, and working towards setting a date to begin behavior change     Current / Ongoing Stressors and Concerns:   Ongoing work stress; described overworking and difficulty having a relaxed and flexible mindset at work as she is the only one trained to do the work and therefore responding with more urgency; was accepting of feedback re: potential perfectionistic tendencies and seemed willing to ask supervisor for confirmation re: limit setting (e.g., in order to meet this deadline, I am not working on...).     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     Intervention:   Motivational Interviewing: open-ended questions, affirmations, reflections and emphasizing personal control and choices, challenge sustain talk, evoke change talk, explore ambivalence, gauge confidence for change   DBT: teach interpersonal effective skills  CBT: identify patterns of anxious thinking and behavior in work and  interpersonal relationships, reinforce proactive problem solving skills, teach effective communication skills        ASSESSMENT: Current Emotional / Mental Status (status of significant symptoms):   Risk status (Self / Other harm or suicidal ideation)   Client denies current fears or concerns for personal safety.   Client denies current or recent suicidal ideation or behaviors.   Client denies current or recent homicidal ideation or behaviors.   Client denies current or recent self injurious behavior or ideation.   Client denies other safety concerns.   Client Client reports there has been no change in risk factors since their last session.     Client Client reports there has been no change in protective factors since their last session.     A safety and risk management plan has not been developed at this time, however client was given the after-hours number / 911 should there be a change in any of these risk factors.     Appearance:   Appropriate    Eye Contact:   Good    Psychomotor Behavior: Normal    Attitude:   Cooperative    Orientation:   All   Speech    Rate / Production: Normal     Volume:  Normal    Mood:    Anxious Stressed   Affect:    Appropriate    Thought Content:  Clear    Thought Form:  Coherent  Goal Directed  Logical    Insight:    Good      Medication Review:   No current psychiatric medications prescribed     Medication Compliance:   NA     Changes in Health Issues:   None reported     Chemical Use Review:   Substance Use: Chemical use reviewed, no active concerns identified      Tobacco Use: No current tobacco use      Collateral Reports Completed:   Not Applicable      PLAN: (Client Tasks / Therapist Tasks / Other)  Therapist will assign homework of awareness development; provide educational materials on distress tolerance skills; teach how to choose an intensity for asking for help or saying  no .        Aury Rodgers, [facility]                                                          ________________________________________________________________________    Treatment Plan    Client's Name: Belle Velez  YOB: 1980    Date: 12/10/2018    Diagnoses: PROVISIONAL 300.02 (F41.1) Generalized Anxiety Disorder; RULE OUT Depression (client does not identify as having depression)  Psychosocial & Contextual Factors: Family stress, strain in marriage, work stress, close family and friends out of state  WHODAS: 17    Referral / Collaboration:  Referral to another professional/service is not indicated at this time.    Anticipated number of session or this episode of care: 12      MeasurableTreatment Goal(s) related to diagnosis / functional impairment(s)  Goal 1: Client will better manage anxiety as evidenced by decreased score on CHRISTIANNE 7 from 12 (moderate) to 5 or less (mild).    I will know I've met my goal when I can maintain work satisfaction and have improved communication with my  and his family.      Objective #A (Client Action)    Client will practice effective communication with  weekly and be able to identify her role in 's family.  Status: New - Date: 12/10/2018     Intervention(s)  Therapist will assign homework of skill practice; provide educational materials on interpersonal effectiveness; role-play conflict management and communication skills; teach the client how to perform a behavioral chain analysis.    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  Status: New - Date: 12/10/2018     Intervention(s)  Therapist will assign homework of awareness development; provide educational materials on distress tolerance skills; teach how to choose an intensity for asking for help or saying  no .      Client has reviewed and agreed to the above plan.      KLAUS Abrams  December 10, 2018

## 2019-01-11 ENCOUNTER — OFFICE VISIT (OUTPATIENT)
Dept: PSYCHOLOGY | Facility: CLINIC | Age: 39
End: 2019-01-11
Payer: COMMERCIAL

## 2019-01-11 DIAGNOSIS — F41.1 GENERALIZED ANXIETY DISORDER: Primary | ICD-10-CM

## 2019-01-11 PROCEDURE — 90834 PSYTX W PT 45 MINUTES: CPT | Performed by: COUNSELOR

## 2019-01-11 ASSESSMENT — ANXIETY QUESTIONNAIRES
GAD7 TOTAL SCORE: 5
7. FEELING AFRAID AS IF SOMETHING AWFUL MIGHT HAPPEN: NOT AT ALL
IF YOU CHECKED OFF ANY PROBLEMS ON THIS QUESTIONNAIRE, HOW DIFFICULT HAVE THESE PROBLEMS MADE IT FOR YOU TO DO YOUR WORK, TAKE CARE OF THINGS AT HOME, OR GET ALONG WITH OTHER PEOPLE: NOT DIFFICULT AT ALL
5. BEING SO RESTLESS THAT IT IS HARD TO SIT STILL: NOT AT ALL
1. FEELING NERVOUS, ANXIOUS, OR ON EDGE: SEVERAL DAYS
2. NOT BEING ABLE TO STOP OR CONTROL WORRYING: SEVERAL DAYS
6. BECOMING EASILY ANNOYED OR IRRITABLE: SEVERAL DAYS
3. WORRYING TOO MUCH ABOUT DIFFERENT THINGS: SEVERAL DAYS

## 2019-01-11 ASSESSMENT — PATIENT HEALTH QUESTIONNAIRE - PHQ9
5. POOR APPETITE OR OVEREATING: SEVERAL DAYS
SUM OF ALL RESPONSES TO PHQ QUESTIONS 1-9: 3

## 2019-01-11 NOTE — PROGRESS NOTES
Progress Note    Client Name: Belle Velez  Date: 1/11/2019         Service Type: Individual      Session Start Time: 9:00a  Session End Time: 9:45a      Session Length: 45 minutes     Session #: 5     Attendees: Client attended alone    Treatment Plan Last Reviewed: 1/11/2019  PHQ-9 / CHRISTIANNE-7 : 3 & 5       DATA      Progress Since Last Session (Related to Symptoms / Goals / Homework):   Symptoms: Stable, see Epic for CHRISTINANE 7 updates    Homework: Partially completed and continued - client will also work to set limits at work (approaching other  with more neutrality).       Episode of Care Goals: Some progress - PREPARATION (Decided to change - considering how); Intervened by negotiating a change plan and determining options / strategies for behavior change, identifying triggers, exploring social supports, and working towards setting a date to begin behavior change     Current / Ongoing Stressors and Concerns:   New stress re: father experiencing health change with cardiac procedure; reported wanting to support mother and to challenge father around lifestyle; ongoing stress re: how to support father in law - described wanting to detach from care giving, but also feeling responsible to support  and help coordinate logistics and feeling disappointed when  does not share changes in luis's health/care with her; ongoing work stress - unsure how to interact with other , feeling emotional pressured/confused by coworker's emotional unpredictability.       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 Client will practice effective communication with  weekly and be able to identify her role in 's family.     Intervention:   Motivational Interviewing: open-ended questions, affirmations, reflections and  "emphasizing personal control and choices, challenge sustain talk, evoke change talk, explore ambivalence, gauge confidence for change   DBT: teach interpersonal effective skills and dialectical thinking/all or nothing thinking  CBT: identify patterns of anxious thinking and behavior in work and interpersonal relationships, reinforce proactive problem solving skills, teach effective communication skills, teach about accountability and assertiveness        ASSESSMENT: Current Emotional / Mental Status (status of significant symptoms):   Risk status (Self / Other harm or suicidal ideation)   Client denies current fears or concerns for personal safety.   Client denies current or recent suicidal ideation or behaviors.   Client denies current or recent homicidal ideation or behaviors.   Client denies current or recent self injurious behavior or ideation.   Client denies other safety concerns.   Client Client reports there has been no change in risk factors since their last session.     Client Client reports there has been no change in protective factors since their last session.     A safety and risk management plan has not been developed at this time, however client was given the after-hours number / 911 should there be a change in any of these risk factors.     Appearance:   Appropriate    Eye Contact:   Good    Psychomotor Behavior: Normal    Attitude:   Cooperative    Orientation:   All   Speech    Rate / Production: Normal     Volume:  Normal    Mood:    Mild anxiousness \"Distracted\"    Affect:    Appropriate    Thought Content:  Clear    Thought Form:  Coherent  Goal Directed  Logical    Insight:    Good      Medication Review:   No current psychiatric medications prescribed     Medication Compliance:   NA     Changes in Health Issues:   None reported     Chemical Use Review:   Substance Use: Chemical use reviewed, no active concerns identified      Tobacco Use: No current tobacco use      Collateral Reports " Completed:   Not Applicable      PLAN: (Client Tasks / Therapist Tasks / Other)  Therapist will assign homework of awareness development; provide educational materials on distress tolerance skills; teach how to choose an intensity for asking for help or saying  no . Continue to reinforce boundary setting and interpersonal effectiveness.         Aury Rodgers [facility]                                                         ________________________________________________________________________    Treatment Plan    Client's Name: Belle Velez  YOB: 1980    Date: 12/10/2018    Diagnoses: PROVISIONAL 300.02 (F41.1) Generalized Anxiety Disorder; RULE OUT Depression (client does not identify as having depression)  Psychosocial & Contextual Factors: Family stress, strain in marriage, work stress, close family and friends out of state  WHODAS: 17    Referral / Collaboration:  Referral to another professional/service is not indicated at this time.    Anticipated number of session or this episode of care: 12      MeasurableTreatment Goal(s) related to diagnosis / functional impairment(s)  Goal 1: Client will better manage anxiety as evidenced by decreased score on CHRISTIANNE 7 from 12 (moderate) to 5 or less (mild).    I will know I've met my goal when I can maintain work satisfaction and have improved communication with my  and his family.      Objective #A (Client Action)    Client will practice effective communication with  weekly and be able to identify her role in 's family.  Status: New - Date: 12/10/2018     Intervention(s)  Therapist will assign homework of skill practice; provide educational materials on interpersonal effectiveness; role-play conflict management and communication skills; teach the client how to perform a behavioral chain analysis.    Objective #B  Client will learn 3 distress tolerance skills to have greater acceptance and feel more at peace about being a working  mother.  Status: New - Date: 12/10/2018     Intervention(s)  Therapist will assign homework of awareness development; provide educational materials on distress tolerance skills; teach how to choose an intensity for asking for help or saying  no .      Client has reviewed and agreed to the above plan.      KLAUS Abrams  December 10, 2018

## 2019-01-12 ASSESSMENT — ANXIETY QUESTIONNAIRES: GAD7 TOTAL SCORE: 5

## 2019-01-25 ENCOUNTER — OFFICE VISIT (OUTPATIENT)
Dept: PSYCHOLOGY | Facility: CLINIC | Age: 39
End: 2019-01-25
Payer: COMMERCIAL

## 2019-01-25 DIAGNOSIS — F41.1 GENERALIZED ANXIETY DISORDER: Primary | ICD-10-CM

## 2019-01-25 PROCEDURE — 90834 PSYTX W PT 45 MINUTES: CPT | Performed by: COUNSELOR

## 2019-01-25 ASSESSMENT — ANXIETY QUESTIONNAIRES
GAD7 TOTAL SCORE: 4
3. WORRYING TOO MUCH ABOUT DIFFERENT THINGS: SEVERAL DAYS
7. FEELING AFRAID AS IF SOMETHING AWFUL MIGHT HAPPEN: NOT AT ALL
1. FEELING NERVOUS, ANXIOUS, OR ON EDGE: SEVERAL DAYS
5. BEING SO RESTLESS THAT IT IS HARD TO SIT STILL: NOT AT ALL
2. NOT BEING ABLE TO STOP OR CONTROL WORRYING: SEVERAL DAYS
6. BECOMING EASILY ANNOYED OR IRRITABLE: NOT AT ALL
IF YOU CHECKED OFF ANY PROBLEMS ON THIS QUESTIONNAIRE, HOW DIFFICULT HAVE THESE PROBLEMS MADE IT FOR YOU TO DO YOUR WORK, TAKE CARE OF THINGS AT HOME, OR GET ALONG WITH OTHER PEOPLE: NOT DIFFICULT AT ALL

## 2019-01-25 ASSESSMENT — PATIENT HEALTH QUESTIONNAIRE - PHQ9: 5. POOR APPETITE OR OVEREATING: SEVERAL DAYS

## 2019-01-25 NOTE — PROGRESS NOTES
0  Progress Note    Client Name: Belle Velez  Date: 1/25/2019         Service Type: Individual      Session Start Time: 9:05a  Session End Time: 9:50a      Session Length: 45 minutes     Session #: 6     Attendees: Client attended alone    Treatment Plan Last Reviewed: 1/25/2019  PHQ-9 / CHRISTIANNE-7 : 3 & 4       DATA      Progress Since Last Session (Related to Symptoms / Goals / Homework):   Symptoms: Stable, see Epic for CHRISTIANNE 7 updates    Homework: Partially completed and continued - client will also work to set limits at work (approaching other  with more neutrality). Client will practice distress tolerance skills (feeling more at peace).      Episode of Care Goals: Some progress - PREPARATION (Decided to change - considering how); Intervened by negotiating a change plan and determining options / strategies for behavior change, identifying triggers, exploring social supports, and working towards setting a date to begin behavior change     Current / Ongoing Stressors and Concerns:   Ongoing stress and anxiety about parents' future - worried about their health, finances, and patterns of poor decisions/planning; described constant worrying and desire to problem solve as a result; ongoing work stress re: workload and never ending backlog; reported feeling unsure how to find relief from stress when relief is dependent on others.      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 Client will practice effective communication with  weekly and be able to identify her role in 's family.     Intervention:   Motivational Interviewing: open-ended questions, affirmations, reflections and emphasizing personal control and choices, challenge sustain talk, evoke change talk, explore ambivalence, gauge confidence for change   DBT: teach  interpersonal effective skills and dialectical thinking/all or nothing thinking  CBT: identify patterns of anxious thinking and behavior in work and interpersonal relationships, reinforce proactive problem solving skills, teach effective communication skills, teach about accountability and assertiveness, teach about internal locus of control         ASSESSMENT: Current Emotional / Mental Status (status of significant symptoms):   Risk status (Self / Other harm or suicidal ideation)   Client denies current fears or concerns for personal safety.   Client denies current or recent suicidal ideation or behaviors.   Client denies current or recent homicidal ideation or behaviors.   Client denies current or recent self injurious behavior or ideation.   Client denies other safety concerns.   Client Client reports there has been no change in risk factors since their last session.     Client Client reports there has been no change in protective factors since their last session.     A safety and risk management plan has not been developed at this time, however client was given the after-hours number / 911 should there be a change in any of these risk factors.     Appearance:   Appropriate    Eye Contact:   Good    Psychomotor Behavior: Normal    Attitude:   Cooperative    Orientation:   All   Speech    Rate / Production: Normal     Volume:  Normal    Mood:    Anxious Sad Tearful   Affect:    Appropriate Worrisome   Thought Content:  Clear    Thought Form:  Coherent  Goal Directed  Logical    Insight:    Good      Medication Review:   No current psychiatric medications prescribed     Medication Compliance:   NA     Changes in Health Issues:   None reported     Chemical Use Review:   Substance Use: Chemical use reviewed, no active concerns identified      Tobacco Use: No current tobacco use      Collateral Reports Completed:   Not Applicable      PLAN: (Client Tasks / Therapist Tasks / Other)  Therapist will assign homework of  awareness development; provide educational materials on distress tolerance skills; teach how to choose an intensity for asking for help or saying  no . Continue to reinforce boundary setting and interpersonal effectiveness. Continue to reinforce internal locus on control.         Aury Rodgers [facility]                                                         ________________________________________________________________________    Treatment Plan    Client's Name: Belle Velez  YOB: 1980    Date: 12/10/2018    Diagnoses: PROVISIONAL 300.02 (F41.1) Generalized Anxiety Disorder; RULE OUT Depression (client does not identify as having depression)  Psychosocial & Contextual Factors: Family stress, strain in marriage, work stress, close family and friends out of state  WHODAS: 17    Referral / Collaboration:  Referral to another professional/service is not indicated at this time.    Anticipated number of session or this episode of care: 12      MeasurableTreatment Goal(s) related to diagnosis / functional impairment(s)  Goal 1: Client will better manage anxiety as evidenced by decreased score on CHRISTIANNE 7 from 12 (moderate) to 5 or less (mild).    I will know I've met my goal when I can maintain work satisfaction and have improved communication with my  and his family.      Objective #A (Client Action)    Client will practice effective communication with  weekly and be able to identify her role in 's family.  Status: New - Date: 12/10/2018     Intervention(s)  Therapist will assign homework of skill practice; provide educational materials on interpersonal effectiveness; role-play conflict management and communication skills; teach the client how to perform a behavioral chain analysis.    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  Status: New - Date: 12/10/2018     Intervention(s)  Therapist  will assign homework of awareness development; provide educational materials on distress tolerance skills; teach how to choose an intensity for asking for help or saying  no .      Client has reviewed and agreed to the above plan.      KLAUS Abrams  December 10, 2018

## 2019-01-26 ASSESSMENT — ANXIETY QUESTIONNAIRES: GAD7 TOTAL SCORE: 4

## 2019-02-21 ENCOUNTER — OFFICE VISIT (OUTPATIENT)
Dept: PSYCHOLOGY | Facility: CLINIC | Age: 39
End: 2019-02-21
Payer: COMMERCIAL

## 2019-02-21 DIAGNOSIS — F41.1 GENERALIZED ANXIETY DISORDER: Primary | ICD-10-CM

## 2019-02-21 PROCEDURE — 90834 PSYTX W PT 45 MINUTES: CPT | Performed by: COUNSELOR

## 2019-02-21 ASSESSMENT — PATIENT HEALTH QUESTIONNAIRE - PHQ9: 5. POOR APPETITE OR OVEREATING: SEVERAL DAYS

## 2019-02-21 ASSESSMENT — ANXIETY QUESTIONNAIRES
2. NOT BEING ABLE TO STOP OR CONTROL WORRYING: NOT AT ALL
6. BECOMING EASILY ANNOYED OR IRRITABLE: SEVERAL DAYS
3. WORRYING TOO MUCH ABOUT DIFFERENT THINGS: SEVERAL DAYS
5. BEING SO RESTLESS THAT IT IS HARD TO SIT STILL: NOT AT ALL
GAD7 TOTAL SCORE: 4
7. FEELING AFRAID AS IF SOMETHING AWFUL MIGHT HAPPEN: NOT AT ALL
1. FEELING NERVOUS, ANXIOUS, OR ON EDGE: SEVERAL DAYS

## 2019-02-21 NOTE — PROGRESS NOTES
"                                           Progress Note    Client Name: Belle Velez  Date: 2/21/2019         Service Type: Individual   Video Visit: No     Session Start Time: 11:05a  Session End Time: 11:50a      Session Length: 40 minutes     Session #: 7     Attendees: Client attended alone    Treatment Plan Last Reviewed: 2/21/2019  PHQ-9 / CHRISTIANNE-7: 3 & 4       DATA  Interactive Complexity: No  Crisis: No       Progress Since Last Session (Related to Symptoms / Goals / Homework):   Symptoms: Stable, see Epic for CHRISTIANNE 7 updates    Homework: Partially completed and continued - client will practice distress tolerance skills (feeling more at peace).      Episode of Care Goals: Some progress - PREPARATION (Decided to change - considering how); Intervened by negotiating a change plan and determining options / strategies for behavior change, identifying triggers, exploring social supports, and working towards setting a date to begin behavior change     Current / Ongoing Stressors and Concerns:   -New stress re: luis's cancer came back, needing education on how to understand his emotional and interpersonal sx's   -Ongoing work stress, needing clarification from supervisor, too many things to prioritize, lack of direction, received feedback from other  that she is observed to \"spin\" her wheels or becomes too involved in figuring things out (which ct does not agree with)     Treatment Objective(s) Addressed in This Session:    Client will learn 3 distress tolerance skills to have greater acceptance and feel more at peace about being a working mother. Client will practice effective communication with  weekly and be able to identify her role in 's family.     Intervention:   Motivational Interviewing: open-ended questions, affirmations, reflections and emphasizing personal control and choices, challenge sustain talk, evoke change talk, explore ambivalence, gauge confidence for change   DBT: teach " interpersonal effective skills and dialectical thinking/all or nothing thinking  CBT: identify patterns of anxious thinking and behavior in work and interpersonal relationships, reinforce proactive problem solving skills, teach effective communication skills, teach about accountability and assertiveness, teach about internal locus of control         ASSESSMENT: Current Emotional / Mental Status (status of significant symptoms):   Risk status (Self / Other harm or suicidal ideation)   Client denies current fears or concerns for personal safety.   Client denies current or recent suicidal ideation or behaviors.   Client denies current or recent homicidal ideation or behaviors.   Client denies current or recent self injurious behavior or ideation.   Client denies other safety concerns.   Client Client reports there has been no change in risk factors since their last session.     Client Client reports there has been no change in protective factors since their last session.     A safety and risk management plan has not been developed at this time, however client was given the after-hours number / 911 should there be a change in any of these risk factors.  Client Client reports there has been no change in risk factors since their last session.     Client Client reports there has been no change in protective factors since their last session.       Appearance:   Appropriate    Eye Contact:   Good    Psychomotor Behavior: Normal    Attitude:   Cooperative    Orientation:   All   Speech    Rate / Production: Normal     Volume:  Normal    Mood:    Some anxious    Affect:    Appropriate    Thought Content:  Clear    Thought Form:  Coherent  Goal Directed  Logical    Insight:    Good      Medication Review:   No current psychiatric medications prescribed     Medication Compliance:   NA     Changes in Health Issues:   None reported     Chemical Use Review:   Substance Use: Chemical use reviewed, no active concerns identified       Tobacco Use: No current tobacco use      Collateral Reports Completed:   Not Applicable     Diagnosis:   Generalized Anxiety Disorder      PLAN: (Client Tasks / Therapist Tasks / Other)  Therapist will assign homework of awareness development; provide educational materials on distress tolerance skills; teach how to choose an intensity for asking for help or saying  no . Continue to reinforce boundary setting and interpersonal effectiveness. Continue to reinforce internal locus on control.     By next appt, client will practice prioritzing at work by blocking out schedule and assessing importance of work vs interest of work.        Aury Rodgers [facility]                                                         ________________________________________________________________________    Treatment Plan    Client's Name: Belle Velez  YOB: 1980    Date: 12/10/2018    Diagnoses: PROVISIONAL 300.02 (F41.1) Generalized Anxiety Disorder; RULE OUT Depression (client does not identify as having depression)  Psychosocial & Contextual Factors: Family stress, strain in marriage, work stress, close family and friends out of state  WHODAS: 17    Referral / Collaboration:  Referral to another professional/service is not indicated at this time.    Anticipated number of session or this episode of care: 12      MeasurableTreatment Goal(s) related to diagnosis / functional impairment(s)  Goal 1: Client will better manage anxiety as evidenced by decreased score on CHRISTIANNE 7 from 12 (moderate) to 5 or less (mild).    I will know I've met my goal when I can maintain work satisfaction and have improved communication with my  and his family.      Objective #A (Client Action)    Client will practice effective communication with  weekly and be able to identify her role in 's family.  Status: New - Date: 12/10/2018     Intervention(s)  Therapist will assign homework of skill practice; provide educational  materials on interpersonal effectiveness; role-play conflict management and communication skills; teach the client how to perform a behavioral chain analysis.    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  Status: New - Date: 12/10/2018     Intervention(s)  Therapist will assign homework of awareness development; provide educational materials on distress tolerance skills; teach how to choose an intensity for asking for help or saying  no .      Client has reviewed and agreed to the above plan.      KLAUS Abrams  December 10, 2018

## 2019-02-22 ASSESSMENT — ANXIETY QUESTIONNAIRES: GAD7 TOTAL SCORE: 4

## 2019-03-08 ENCOUNTER — OFFICE VISIT (OUTPATIENT)
Dept: PSYCHOLOGY | Facility: CLINIC | Age: 39
End: 2019-03-08
Payer: COMMERCIAL

## 2019-03-08 DIAGNOSIS — F41.1 GENERALIZED ANXIETY DISORDER: Primary | ICD-10-CM

## 2019-03-08 PROCEDURE — 90834 PSYTX W PT 45 MINUTES: CPT | Performed by: COUNSELOR

## 2019-03-08 NOTE — PROGRESS NOTES
Progress Note    Client Name: Belle Velez  Date: 3/8/2019         Service Type: Individual   Video Visit: No     Session Start Time: 3:50p  Session End Time: 4:30p      Session Length: 40 minutes     Session #: 8     Attendees: Client attended alone    Treatment Plan Last Reviewed: 3/8/2019  PHQ-9 / CHRISTIANNE-7: 3 & 4       DATA  Interactive Complexity: No  Crisis: No       Progress Since Last Session (Related to Symptoms / Goals / Homework):   Symptoms: Stable, see Epic for CHRISTIANNE 7 updates    Homework: Partially completed and continued - client will practice prioritzing at work by blocking out schedule and assessing importance of work vs interest of work.      Episode of Care Goals: Some progress - PREPARATION (Decided to change - considering how); Intervened by negotiating a change plan and determining options / strategies for behavior change, identifying triggers, exploring social supports, and working towards setting a date to begin behavior change     Current / Ongoing Stressors and Concerns:   -Continued stressors re: luis's cancer, described caner to be more progressive, functioning compromised, reported being more of a support to  vs  for luis and feeling good about it although she observed  to be depressed while also working to hold things together, family started [facility] routine of [facility], meal prepping regularly now   -Ongoing work stress, reported co-attornery is setting limits re: her work and interactions with ct, but not being supportive of her to clients, manager reinforced co-'s actions, but encouraged ct to document and cc interactions       Treatment Objective(s) Addressed in This Session:    Client will learn 3 distress tolerance skills to have greater acceptance and feel more at peace about being a working mother. Client will practice effective communication with  weekly and be able to identify her role  in 's family.     Intervention:   Motivational Interviewing: open-ended questions, affirmations, reflections and emphasizing personal control and choices, challenge sustain talk, evoke change talk, explore ambivalence, gauge confidence for change   DBT: teach interpersonal effective skills and dialectical thinking/all or nothing thinking, teach validation skills   CBT: identify patterns of anxious thinking and behavior in work and interpersonal relationships, reinforce proactive problem solving skills, teach effective communication skills, teach about accountability and assertiveness, teach about internal locus of control         ASSESSMENT: Current Emotional / Mental Status (status of significant symptoms):   Risk status (Self / Other harm or suicidal ideation)   Client denies current fears or concerns for personal safety.   Client denies current or recent suicidal ideation or behaviors.   Client denies current or recent homicidal ideation or behaviors.   Client denies current or recent self injurious behavior or ideation.   Client denies other safety concerns.   Client Client reports there has been no change in risk factors since their last session.     Client Client reports there has been no change in protective factors since their last session.     A safety and risk management plan has not been developed at this time, however client was given the after-hours number / 911 should there be a change in any of these risk factors.  Client Client reports there has been no change in risk factors since their last session.     Client Client reports there has been no change in protective factors since their last session.       Appearance:   Appropriate    Eye Contact:   Good    Psychomotor Behavior: Normal    Attitude:   Cooperative    Orientation:   All   Speech    Rate / Production: Normal     Volume:  Normal    Mood:    Some anxiousness Stress    Affect:    Appropriate Somewhat worrisome    Thought  Content:  Clear    Thought Form:  Coherent  Goal Directed  Logical    Insight:    Good      Medication Review:   No current psychiatric medications prescribed     Medication Compliance:   NA     Changes in Health Issues:   None reported     Chemical Use Review:   Substance Use: Chemical use reviewed, no active concerns identified      Tobacco Use: No current tobacco use      Collateral Reports Completed:   Not Applicable     Diagnosis:   Generalized Anxiety Disorder      PLAN: (Client Tasks / Therapist Tasks / Other)  Therapist will assign homework of awareness development; provide educational materials on distress tolerance skills; teach how to choose an intensity for asking for help or saying  no . Continue to reinforce boundary setting and interpersonal effectiveness. Continue to reinforce internal locus on control. Teach assertiveness and communication skills for work.    By next appt, client will go to [facility], leave work at 4:30p, and talk with spouse about therapy.        Aury Rodgers [facility]                                                         ________________________________________________________________________    Treatment Plan    Client's Name: Belle Velez  YOB: 1980    Date: 12/10/2018    Diagnoses: PROVISIONAL 300.02 (F41.1) Generalized Anxiety Disorder; RULE OUT Depression (client does not identify as having depression)  Psychosocial & Contextual Factors: Family stress, strain in marriage, work stress, close family and friends out of state  WHODAS: 17    Referral / Collaboration:  Referral to another professional/service is not indicated at this time.    Anticipated number of session or this episode of care: 12      MeasurableTreatment Goal(s) related to diagnosis / functional impairment(s)  Goal 1: Client will better manage anxiety as evidenced by decreased score on CHRISTIANNE 7 from 12 (moderate) to 5 or less (mild).    I will know I've met my goal when I can maintain work satisfaction  and have improved communication with my  and his family.      Objective #A (Client Action)    Client will practice effective communication with  weekly and be able to identify her role in 's family.  Status: New - Date: 12/10/2018     Intervention(s)  Therapist will assign homework of skill practice; provide educational materials on interpersonal effectiveness; role-play conflict management and communication skills; teach the client how to perform a behavioral chain analysis.    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  Status: New - Date: 12/10/2018     Intervention(s)  Therapist will assign homework of awareness development; provide educational materials on distress tolerance skills; teach how to choose an intensity for asking for help or saying  no .      Client has reviewed and agreed to the above plan.      KLAUS Abrams  December 10, 2018

## 2019-03-22 ENCOUNTER — OFFICE VISIT (OUTPATIENT)
Dept: PSYCHOLOGY | Facility: CLINIC | Age: 39
End: 2019-03-22
Payer: COMMERCIAL

## 2019-03-22 DIAGNOSIS — F41.1 GENERALIZED ANXIETY DISORDER: Primary | ICD-10-CM

## 2019-03-22 PROCEDURE — 90834 PSYTX W PT 45 MINUTES: CPT | Performed by: COUNSELOR

## 2019-03-22 ASSESSMENT — ANXIETY QUESTIONNAIRES
5. BEING SO RESTLESS THAT IT IS HARD TO SIT STILL: NOT AT ALL
GAD7 TOTAL SCORE: 4
3. WORRYING TOO MUCH ABOUT DIFFERENT THINGS: SEVERAL DAYS
6. BECOMING EASILY ANNOYED OR IRRITABLE: NOT AT ALL
2. NOT BEING ABLE TO STOP OR CONTROL WORRYING: SEVERAL DAYS
1. FEELING NERVOUS, ANXIOUS, OR ON EDGE: SEVERAL DAYS
7. FEELING AFRAID AS IF SOMETHING AWFUL MIGHT HAPPEN: NOT AT ALL

## 2019-03-22 ASSESSMENT — PATIENT HEALTH QUESTIONNAIRE - PHQ9: 5. POOR APPETITE OR OVEREATING: SEVERAL DAYS

## 2019-03-22 NOTE — PROGRESS NOTES
Progress Note    Client Name: Belle Velez  Date: 3/22/2019         Service Type: Individual   Video Visit: No     Session Start Time: 9:00a  Session End Time: 9:45a      Session Length: 45 minutes     Session #: 9     Attendees: Client attended alone    Treatment Plan Last Reviewed: 3/22/2019  PHQ-9 / CHRISTIANNE-7: 3 & 4       DATA  Interactive Complexity: No  Crisis: No       Progress Since Last Session (Related to Symptoms / Goals / Homework):   Symptoms: Stable, see Epic for CHRISTIANNE 7 updates    Homework: Completed - client will go to [facility], leave work at 4:30p, and talk with spouse about therapy.      Episode of Care Goals: Some progress - PREPARATION (Decided to change - considering how); Intervened by negotiating a change plan and determining options / strategies for behavior change, identifying triggers, exploring social supports, and working towards setting a date to begin behavior change     Current / Ongoing Stressors and Concerns:   -Change in work stressor with assertive communication with manager and coworker, reported learning more about her coworker and realizing how they have such different perspectives, reported manager was supportive with communication   -Ongoing family stress with luis's cancer, reported a desire to support  with planning a trip with luis     Treatment Objective(s) Addressed in This Session:    Client will learn 3 distress tolerance skills to have greater acceptance and feel more at peace about being a working mother. Client will practice effective communication with  weekly and be able to identify her role in 's family.     Intervention:   Motivational Interviewing: open-ended questions, affirmations, reflections and emphasizing personal control and choices, challenge sustain talk, evoke change talk, explore ambivalence, gauge confidence for change   DBT: teach interpersonal effective skills and dialectical thinking/all  or nothing thinking, teach validation skills   CBT: identify patterns of anxious thinking and behavior in work and interpersonal relationships, reinforce proactive problem solving skills, teach effective communication skills, teach about accountability and assertiveness, teach about internal locus of control         ASSESSMENT: Current Emotional / Mental Status (status of significant symptoms):   Risk status (Self / Other harm or suicidal ideation)   Client denies current fears or concerns for personal safety.   Client denies current or recent suicidal ideation or behaviors.   Client denies current or recent homicidal ideation or behaviors.   Client denies current or recent self injurious behavior or ideation.   Client denies other safety concerns.   Client Client reports there has been no change in risk factors since their last session.     Client Client reports there has been no change in protective factors since their last session.     A safety and risk management plan has not been developed at this time, however client was given the after-hours number / 911 should there be a change in any of these risk factors.  Client Client reports there has been no change in risk factors since their last session.     Client Client reports there has been no change in protective factors since their last session.       Appearance:   Appropriate    Eye Contact:   Good    Psychomotor Behavior: Normal    Attitude:   Cooperative    Orientation:   All   Speech    Rate / Production: Normal     Volume:  Normal    Mood:    Some anxiousness Stressed    Affect:    Appropriate Somewhat worrisome    Thought Content:  Clear    Thought Form:  Coherent  Goal Directed  Logical    Insight:    Good      Medication Review:   No current psychiatric medications prescribed     Medication Compliance:   NA     Changes in Health Issues:   None reported     Chemical Use Review:   Substance Use: Chemical use reviewed, no active concerns identified       Tobacco Use: No current tobacco use      Collateral Reports Completed:   Not Applicable     Diagnosis:   Generalized Anxiety Disorder      PLAN: (Client Tasks / Therapist Tasks / Other)  Therapist will assign homework of awareness development; provide educational materials on distress tolerance skills; teach how to choose an intensity for asking for help or saying  no . Continue to reinforce boundary setting and interpersonal effectiveness. Continue to reinforce internal locus on control. Teach assertiveness and communication skills for work.    By next appt, client will follow through with her weekend getaway with [place].         Aury Rodgers, [facility]                                                         ________________________________________________________________________    Treatment Plan    Client's Name: Belle Velez  YOB: 1980    Date: 12/10/2018    Diagnoses: PROVISIONAL 300.02 (F41.1) Generalized Anxiety Disorder; RULE OUT Depression (client does not identify as having depression)  Psychosocial & Contextual Factors: Family stress, strain in marriage, work stress, close family and friends out of state  WHODAS: 17    Referral / Collaboration:  Referral to another professional/service is not indicated at this time.    Anticipated number of session or this episode of care: 12      MeasurableTreatment Goal(s) related to diagnosis / functional impairment(s)  Goal 1: Client will better manage anxiety as evidenced by decreased score on CHRISTIANNE 7 from 12 (moderate) to 5 or less (mild).    I will know I've met my goal when I can maintain work satisfaction and have improved communication with my  and his family.      Objective #A (Client Action)    Client will practice effective communication with  weekly and be able to identify her role in 's family.  Status: New - Date: 12/10/2018     Intervention(s)  Therapist will assign homework of skill practice; provide educational  materials on interpersonal effectiveness; role-play conflict management and communication skills; teach the client how to perform a behavioral chain analysis.    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  Status: New - Date: 12/10/2018     Intervention(s)  Therapist will assign homework of awareness development; provide educational materials on distress tolerance skills; teach how to choose an intensity for asking for help or saying  no .      Client has reviewed and agreed to the above plan.      KLAUS Abrams  December 10, 2018

## 2019-03-23 ASSESSMENT — ANXIETY QUESTIONNAIRES: GAD7 TOTAL SCORE: 4

## 2019-04-08 ENCOUNTER — OFFICE VISIT (OUTPATIENT)
Dept: PSYCHOLOGY | Facility: CLINIC | Age: 39
End: 2019-04-08
Payer: COMMERCIAL

## 2019-04-08 DIAGNOSIS — F41.1 GENERALIZED ANXIETY DISORDER: Primary | ICD-10-CM

## 2019-04-08 PROCEDURE — 90834 PSYTX W PT 45 MINUTES: CPT | Performed by: COUNSELOR

## 2019-04-08 ASSESSMENT — ANXIETY QUESTIONNAIRES
6. BECOMING EASILY ANNOYED OR IRRITABLE: SEVERAL DAYS
3. WORRYING TOO MUCH ABOUT DIFFERENT THINGS: SEVERAL DAYS
1. FEELING NERVOUS, ANXIOUS, OR ON EDGE: MORE THAN HALF THE DAYS
IF YOU CHECKED OFF ANY PROBLEMS ON THIS QUESTIONNAIRE, HOW DIFFICULT HAVE THESE PROBLEMS MADE IT FOR YOU TO DO YOUR WORK, TAKE CARE OF THINGS AT HOME, OR GET ALONG WITH OTHER PEOPLE: NOT DIFFICULT AT ALL
GAD7 TOTAL SCORE: 7
5. BEING SO RESTLESS THAT IT IS HARD TO SIT STILL: NOT AT ALL
2. NOT BEING ABLE TO STOP OR CONTROL WORRYING: SEVERAL DAYS
7. FEELING AFRAID AS IF SOMETHING AWFUL MIGHT HAPPEN: NOT AT ALL

## 2019-04-08 ASSESSMENT — PATIENT HEALTH QUESTIONNAIRE - PHQ9: 5. POOR APPETITE OR OVEREATING: MORE THAN HALF THE DAYS

## 2019-04-08 NOTE — PROGRESS NOTES
Progress Note    Client Name: Belle Velez  Date: 4/8/2019         Service Type: Individual   Video Visit: No     Session Start Time: 10:00a  Session End Time: 10:45a      Session Length: 45 minutes     Session #: 10     Attendees: Client attended alone    Treatment Plan Last Reviewed: 4/8/2019  PHQ-9 / CHRISTIANNE-7: 3 & 7       DATA  Interactive Complexity: No  Crisis: No       Progress Since Last Session (Related to Symptoms / Goals / Homework):   Symptoms: Stable, a couple days of anxious distraction, see Epic for CHRISTIANNE 7 updates    Homework: Completed - client will follow through with her weekend getaway with [place].       Episode of Care Goals: Satisfactory progress - ACTION (Actively working towards change); Intervened by reinforcing change plan / affirming steps taken     Current / Ongoing Stressors and Concerns:   Ongoing interpersonal work stress, working to navigate relationships, boundaries, and assertiveness; has addressed professional work access concerns and acknowledged that she will eventually have to address interpersonal issues; reported noticing patterns of interactions (e.g., coworker being controlling), but is unsure how she wants to address problems at this time.      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 Client will practice effective communication with  weekly and be able to identify her role in 's family.     Intervention:   Motivational Interviewing: open-ended questions, affirmations, reflections and emphasizing personal control and choices, challenge sustain talk, evoke change talk, explore ambivalence, gauge confidence for change   DBT: teach interpersonal effective skills and dialectical thinking/all or nothing thinking, teach validation skills, reinforce noticing skills   CBT: identify  patterns of anxious thinking and behavior in work and interpersonal relationships, reinforce proactive problem solving skills, teach effective communication skills, teach about accountability and assertiveness, teach about internal locus of control, teach assertiveness skills for conflict resolution          ASSESSMENT: Current Emotional / Mental Status (status of significant symptoms):   Risk status (Self / Other harm or suicidal ideation)   Client denies current fears or concerns for personal safety.   Client denies current or recent suicidal ideation or behaviors.   Client denies current or recent homicidal ideation or behaviors.   Client denies current or recent self injurious behavior or ideation.   Client denies other safety concerns.   Client Client reports there has been no change in risk factors since their last session.     Client Client reports there has been no change in protective factors since their last session.     A safety and risk management plan has not been developed at this time, however client was given the after-hours number / 911 should there be a change in any of these risk factors.  Client Client reports there has been no change in risk factors since their last session.     Client Client reports there has been no change in protective factors since their last session.       Appearance:   Appropriate    Eye Contact:   Good    Psychomotor Behavior: Normal    Attitude:   Cooperative    Orientation:   All   Speech    Rate / Production: Normal     Volume:  Normal    Mood:    Some anxiousness Stressed    Affect:    Appropriate Somewhat worrisome    Thought Content:  Clear    Thought Form:  Coherent  Goal Directed  Logical    Insight:    Good      Medication Review:   No current psychiatric medications prescribed     Medication Compliance:   NA     Changes in Health Issues:   None reported     Chemical Use Review:   Substance Use: Chemical use reviewed, no active concerns identified      Tobacco  Use: No current tobacco use      Collateral Reports Completed:   Not Applicable     Diagnosis:   Generalized Anxiety Disorder      PLAN: (Client Tasks / Therapist Tasks / Other)  Therapist will assign homework of awareness development; provide educational materials on distress tolerance skills; teach how to choose an intensity for asking for help or saying  no . Continue to reinforce boundary setting and interpersonal effectiveness. Continue to reinforce internal locus on control. Teach assertiveness and communication skills for work.    By next appt, client will assess boundary setting with coworker.        Aury Rodgers, [facility]                                                         ________________________________________________________________________    Treatment Plan    Client's Name: Belle Velez  YOB: 1980    Date: 12/10/2018    Diagnoses: PROVISIONAL 300.02 (F41.1) Generalized Anxiety Disorder; RULE OUT Depression (client does not identify as having depression)  Psychosocial & Contextual Factors: Family stress, strain in marriage, work stress, close family and friends out of state  WHODAS: 17    Referral / Collaboration:  Referral to another professional/service is not indicated at this time.    Anticipated number of session or this episode of care: 12      MeasurableTreatment Goal(s) related to diagnosis / functional impairment(s)  Goal 1: Client will better manage anxiety as evidenced by decreased score on CHRISTIANNE 7 from 12 (moderate) to 5 or less (mild).    I will know I've met my goal when I can maintain work satisfaction and have improved communication with my  and his family.      Objective #A (Client Action)    Client will practice effective communication with  weekly and be able to identify her role in 's family.  Status: New - Date: 12/10/2018     Intervention(s)  Therapist will assign homework of skill practice; provide educational materials on interpersonal  effectiveness; role-play conflict management and communication skills; teach the client how to perform a behavioral chain analysis.    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  Status: New - Date: 12/10/2018     Intervention(s)  Therapist will assign homework of awareness development; provide educational materials on distress tolerance skills; teach how to choose an intensity for asking for help or saying  no .      Client has reviewed and agreed to the above plan.      KLAUS Abrams  December 10, 2018

## 2019-04-09 ASSESSMENT — ANXIETY QUESTIONNAIRES: GAD7 TOTAL SCORE: 7

## 2019-05-10 ENCOUNTER — OFFICE VISIT (OUTPATIENT)
Dept: PSYCHOLOGY | Facility: CLINIC | Age: 39
End: 2019-05-10
Payer: COMMERCIAL

## 2019-05-10 DIAGNOSIS — F41.1 GENERALIZED ANXIETY DISORDER: Primary | ICD-10-CM

## 2019-05-10 PROCEDURE — 90834 PSYTX W PT 45 MINUTES: CPT | Performed by: COUNSELOR

## 2019-05-10 ASSESSMENT — ANXIETY QUESTIONNAIRES
7. FEELING AFRAID AS IF SOMETHING AWFUL MIGHT HAPPEN: NOT AT ALL
5. BEING SO RESTLESS THAT IT IS HARD TO SIT STILL: NOT AT ALL
GAD7 TOTAL SCORE: 3
1. FEELING NERVOUS, ANXIOUS, OR ON EDGE: SEVERAL DAYS
6. BECOMING EASILY ANNOYED OR IRRITABLE: SEVERAL DAYS
IF YOU CHECKED OFF ANY PROBLEMS ON THIS QUESTIONNAIRE, HOW DIFFICULT HAVE THESE PROBLEMS MADE IT FOR YOU TO DO YOUR WORK, TAKE CARE OF THINGS AT HOME, OR GET ALONG WITH OTHER PEOPLE: SOMEWHAT DIFFICULT
2. NOT BEING ABLE TO STOP OR CONTROL WORRYING: NOT AT ALL
3. WORRYING TOO MUCH ABOUT DIFFERENT THINGS: NOT AT ALL

## 2019-05-10 ASSESSMENT — PATIENT HEALTH QUESTIONNAIRE - PHQ9: 5. POOR APPETITE OR OVEREATING: SEVERAL DAYS

## 2019-05-10 NOTE — PROGRESS NOTES
Progress Note    Client Name: Belle Velez  Date: 5/10/2019         Service Type: Individual   Video Visit: No     Session Start Time: 9:05a  Session End Time: 9:50a      Session Length: 40 minutes     Session #: 11     Attendees: Client attended alone    Treatment Plan Last Reviewed: 4/8/2019  PHQ-9 / CHRISTIANNE-7: 3 & 3       DATA  Interactive Complexity: No  Crisis: No       Progress Since Last Session (Related to Symptoms / Goals / Homework):   Symptoms: Stable, a couple days of anxious distraction, see Epic for CHRISTIANNE 7 updates    Homework: Completed - will assess boundary setting with coworker.      Episode of Care Goals: Satisfactory progress - ACTION (Actively working towards change); Intervened by reinforcing change plan / affirming steps taken     Current / Ongoing Stressors and Concerns:   Ongoing interpersonal work stress; acknowledged patterns of strained interactions, feeling reactive towards coworker, feeling minimized, but at the same time wanting to be friends with coworker, seeking approval and acceptance; reported coming to the conclusion that she cannot be friends with coworkers and reported feeling good about taking more ownership in her work and in client relationships.      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 Client will practice effective communication with  weekly and be able to identify her role in 's family.     Intervention:   Motivational Interviewing: open-ended questions, affirmations, reflections and emphasizing personal control and choices, challenge sustain talk, evoke change talk, explore ambivalence, gauge confidence for change   DBT: teach interpersonal effective skills and dialectical thinking/all or nothing thinking, teach validation skills, reinforce noticing skills   CBT: identify  patterns of anxious thinking and behavior in work and interpersonal relationships, reinforce proactive problem solving skills, teach effective communication skills, teach about accountability and assertiveness, teach about internal locus of control, teach assertiveness skills for conflict resolution          ASSESSMENT: Current Emotional / Mental Status (status of significant symptoms):   Risk status (Self / Other harm or suicidal ideation)   Client denies current fears or concerns for personal safety.   Client denies current or recent suicidal ideation or behaviors.   Client denies current or recent homicidal ideation or behaviors.   Client denies current or recent self injurious behavior or ideation.   Client denies other safety concerns.   Client Client reports there has been no change in risk factors since their last session.     Client Client reports there has been no change in protective factors since their last session.     A safety and risk management plan has not been developed at this time, however client was given the after-hours number / 911 should there be a change in any of these risk factors.   Client reports there has been no change in risk factors since their last session.     Client reports there has been no change in protective factors since their last session.       Appearance:   Appropriate    Eye Contact:   Good    Psychomotor Behavior: Normal    Attitude:   Cooperative    Orientation:   All   Speech    Rate / Production: Normal     Volume:  Normal    Mood:    Some anxiousness    Affect:    Appropriate    Thought Content:  Clear    Thought Form:  Coherent  Goal Directed  Logical    Insight:    Good      Medication Review:   No current psychiatric medications prescribed     Medication Compliance:   NA     Changes in Health Issues:   None reported     Chemical Use Review:   Substance Use: Chemical use reviewed, no active concerns identified      Tobacco Use: No current tobacco use      Collateral  Reports Completed:   Not Applicable     Diagnosis:   Generalized Anxiety Disorder      PLAN: (Client Tasks / Therapist Tasks / Other)  Therapist will assign homework of awareness development; provide educational materials on distress tolerance skills; teach how to choose an intensity for asking for help or saying  no . Continue to reinforce boundary setting and interpersonal effectiveness. Continue to reinforce internal locus on control. Teach assertiveness and communication skills for work.    By next appt, client continue to assert direct communication with coworker and assess her own behaviors/thoughts in interpersonal conflicts.         Aury Rodgers [facility]                                                         ________________________________________________________________________    Treatment Plan    Client's Name: Belle Velez  YOB: 1980    Date: 12/10/2018    Diagnoses: PROVISIONAL 300.02 (F41.1) Generalized Anxiety Disorder; RULE OUT Depression (client does not identify as having depression)  Psychosocial & Contextual Factors: Family stress, strain in marriage, work stress, close family and friends out of state  WHODAS: 17    Referral / Collaboration:  Referral to another professional/service is not indicated at this time.    Anticipated number of session or this episode of care: 12      MeasurableTreatment Goal(s) related to diagnosis / functional impairment(s)  Goal 1: Client will better manage anxiety as evidenced by decreased score on CHRISTIANNE 7 from 12 (moderate) to 5 or less (mild).    I will know I've met my goal when I can maintain work satisfaction and have improved communication with my  and his family.      Objective #A (Client Action)    Client will practice effective communication with  weekly and be able to identify her role in 's family.  Status: New - Date: 12/10/2018     Intervention(s)  Therapist will assign homework of skill practice; provide  educational materials on interpersonal effectiveness; role-play conflict management and communication skills; teach the client how to perform a behavioral chain analysis.    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  Status: New - Date: 12/10/2018     Intervention(s)  Therapist will assign homework of awareness development; provide educational materials on distress tolerance skills; teach how to choose an intensity for asking for help or saying  no .      Client has reviewed and agreed to the above plan.      KLAUS Abrams  December 10, 2018

## 2019-05-11 ASSESSMENT — ANXIETY QUESTIONNAIRES: GAD7 TOTAL SCORE: 3

## 2019-05-31 ENCOUNTER — OFFICE VISIT (OUTPATIENT)
Dept: PSYCHOLOGY | Facility: CLINIC | Age: 39
End: 2019-05-31
Payer: COMMERCIAL

## 2019-05-31 DIAGNOSIS — F41.1 GENERALIZED ANXIETY DISORDER: Primary | ICD-10-CM

## 2019-05-31 PROCEDURE — 90834 PSYTX W PT 45 MINUTES: CPT | Performed by: COUNSELOR

## 2019-05-31 ASSESSMENT — ANXIETY QUESTIONNAIRES
7. FEELING AFRAID AS IF SOMETHING AWFUL MIGHT HAPPEN: NOT AT ALL
2. NOT BEING ABLE TO STOP OR CONTROL WORRYING: NOT AT ALL
1. FEELING NERVOUS, ANXIOUS, OR ON EDGE: SEVERAL DAYS
GAD7 TOTAL SCORE: 3
6. BECOMING EASILY ANNOYED OR IRRITABLE: NOT AT ALL
5. BEING SO RESTLESS THAT IT IS HARD TO SIT STILL: NOT AT ALL
IF YOU CHECKED OFF ANY PROBLEMS ON THIS QUESTIONNAIRE, HOW DIFFICULT HAVE THESE PROBLEMS MADE IT FOR YOU TO DO YOUR WORK, TAKE CARE OF THINGS AT HOME, OR GET ALONG WITH OTHER PEOPLE: NOT DIFFICULT AT ALL
3. WORRYING TOO MUCH ABOUT DIFFERENT THINGS: SEVERAL DAYS

## 2019-05-31 ASSESSMENT — PATIENT HEALTH QUESTIONNAIRE - PHQ9: 5. POOR APPETITE OR OVEREATING: SEVERAL DAYS

## 2019-05-31 NOTE — PROGRESS NOTES
"                                           Progress Note    Client Name: Belle Velez  Date: 5/31/2019         Service Type: Individual   Video Visit: No     Session Start Time: 10:05a  Session End Time: 10:50a      Session Length: 45 minutes     Session #: 12     Attendees: Client attended alone    Treatment Plan Last Reviewed: 4/8/2019  PHQ-9 / CHRISTIANNE-7: 3 & 3       DATA  Interactive Complexity: No  Crisis: No       Progress Since Last Session (Related to Symptoms / Goals / Homework):   Symptoms: Stable, a couple days of anxious distraction, see Epic for CHRISTIANNE 7 updates    Homework: Completed and continued - continue to assert direct communication with coworker and assess her own behaviors/thoughts in interpersonal conflicts.       Episode of Care Goals: Satisfactory progress - ACTION (Actively working towards change); Intervened by reinforcing change plan / affirming steps taken     Current / Ongoing Stressors and Concerns:   Ongoing family stress re: luis in hospice care; acknowledged differences in family dynamics between her and 's family and expressed feeling \"compelled\" to build connection with 's grandparents, with whom  has no relationship; acknowledged feeling curious about patterns of family relationships with luis's health deteriorating, but also needing to be cautious in how she places herself in difficult situations with in laws and their family as well as to assess 's current capacity to manage those relationships; reported tentative plans to follow through with [place] trip, knowing luis may pass while they are away; acknowledged needing to confirm with  again.     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 Client will practice effective communication with  weekly and be able to identify her role in 's " family.     Intervention:   Motivational Interviewing: open-ended questions, affirmations, reflections and emphasizing personal control and choices, challenge sustain talk, evoke change talk, explore ambivalence, gauge confidence for change   DBT: teach interpersonal effective skills and dialectical thinking/all or nothing thinking, teach validation skills, reinforce noticing skills, reinforce self care   CBT: identify patterns of anxious thinking and behavior in work and interpersonal relationships, reinforce proactive problem solving skills, teach effective communication skills, teach about accountability and assertiveness, teach about internal locus of control, teach assertiveness skills for conflict resolution, identify and acknowledge process of grief           ASSESSMENT: Current Emotional / Mental Status (status of significant symptoms):   Risk status (Self / Other harm or suicidal ideation)   Client denies current fears or concerns for personal safety.   Client denies current or recent suicidal ideation or behaviors.   Client denies current or recent homicidal ideation or behaviors.   Client denies current or recent self injurious behavior or ideation.   Client denies other safety concerns.   Client Client reports there has been no change in risk factors since their last session.     Client Client reports there has been no change in protective factors since their last session.     A safety and risk management plan has not been developed at this time, however client was given the after-hours number / 911 should there be a change in any of these risk factors.   Client reports there has been no change in risk factors since their last session.     Client reports there has been no change in protective factors since their last session.       Appearance:   Appropriate    Eye Contact:   Good    Psychomotor Behavior: Normal    Attitude:   Cooperative    Orientation:   All   Speech    Rate / Production: Normal      Volume:  Normal    Mood:    Some anxiousness Sad Tearful   Affect:    Appropriate    Thought Content:  Clear    Thought Form:  Coherent  Goal Directed  Logical    Insight:    Good      Medication Review:   No current psychiatric medications prescribed     Medication Compliance:   NA     Changes in Health Issues:   None reported     Chemical Use Review:   Substance Use: Chemical use reviewed, no active concerns identified      Tobacco Use: No current tobacco use      Collateral Reports Completed:   Not Applicable     Diagnosis:   Generalized Anxiety Disorder      PLAN: (Client Tasks / Therapist Tasks / Other)  Therapist will assign homework of awareness development; provide educational materials on distress tolerance skills; teach how to choose an intensity for asking for help or saying  no . Continue to reinforce boundary setting and interpersonal effectiveness. Continue to reinforce internal locus on control. Teach assertiveness and communication skills for work.    By next appt, client will communicate with  to assess plans for [facility] trip.        Aury Rodgers [facility]                                                         ________________________________________________________________________    Treatment Plan    Client's Name: Belle Velez  YOB: 1980    Date: 12/10/2018    Diagnoses: PROVISIONAL 300.02 (F41.1) Generalized Anxiety Disorder; RULE OUT Depression (client does not identify as having depression)  Psychosocial & Contextual Factors: Family stress, strain in marriage, work stress, close family and friends out of state  WHODAS: 17    Referral / Collaboration:  Referral to another professional/service is not indicated at this time.    Anticipated number of session or this episode of care: 12      MeasurableTreatment Goal(s) related to diagnosis / functional impairment(s)  Goal 1: Client will better manage anxiety as evidenced by decreased score on CHRISTIANNE 7 from 12 (moderate) to  5 or less (mild).    I will know I've met my goal when I can maintain work satisfaction and have improved communication with my  and his family.      Objective #A (Client Action)    Client will practice effective communication with  weekly and be able to identify her role in 's family.  Status: New - Date: 12/10/2018     Intervention(s)  Therapist will assign homework of skill practice; provide educational materials on interpersonal effectiveness; role-play conflict management and communication skills; teach the client how to perform a behavioral chain analysis.    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  Status: New - Date: 12/10/2018     Intervention(s)  Therapist will assign homework of awareness development; provide educational materials on distress tolerance skills; teach how to choose an intensity for asking for help or saying  no .      Client has reviewed and agreed to the above plan.      Aury Rodgers [facility]  December 10, 2018

## 2019-06-01 ASSESSMENT — ANXIETY QUESTIONNAIRES: GAD7 TOTAL SCORE: 3

## 2019-07-02 ENCOUNTER — OFFICE VISIT (OUTPATIENT)
Dept: PSYCHOLOGY | Facility: CLINIC | Age: 39
End: 2019-07-02
Payer: COMMERCIAL

## 2019-07-02 DIAGNOSIS — F41.1 GENERALIZED ANXIETY DISORDER: Primary | ICD-10-CM

## 2019-07-02 PROCEDURE — 90834 PSYTX W PT 45 MINUTES: CPT | Performed by: COUNSELOR

## 2019-07-02 ASSESSMENT — ANXIETY QUESTIONNAIRES
2. NOT BEING ABLE TO STOP OR CONTROL WORRYING: NOT AT ALL
6. BECOMING EASILY ANNOYED OR IRRITABLE: NOT AT ALL
3. WORRYING TOO MUCH ABOUT DIFFERENT THINGS: SEVERAL DAYS
1. FEELING NERVOUS, ANXIOUS, OR ON EDGE: SEVERAL DAYS
5. BEING SO RESTLESS THAT IT IS HARD TO SIT STILL: NOT AT ALL
GAD7 TOTAL SCORE: 3
IF YOU CHECKED OFF ANY PROBLEMS ON THIS QUESTIONNAIRE, HOW DIFFICULT HAVE THESE PROBLEMS MADE IT FOR YOU TO DO YOUR WORK, TAKE CARE OF THINGS AT HOME, OR GET ALONG WITH OTHER PEOPLE: NOT DIFFICULT AT ALL
7. FEELING AFRAID AS IF SOMETHING AWFUL MIGHT HAPPEN: NOT AT ALL

## 2019-07-02 ASSESSMENT — PATIENT HEALTH QUESTIONNAIRE - PHQ9: 5. POOR APPETITE OR OVEREATING: SEVERAL DAYS

## 2019-07-02 NOTE — PROGRESS NOTES
Progress Note    Client Name: Belle Velez  Date: 7/2/2019         Service Type: Individual   Video Visit: No     Session Start Time: 1:00p  Session End Time: 1:45p      Session Length: 45 minutes     Session #: 13     Attendees: Client attended alone    Treatment Plan Last Reviewed: 4/8/2019  PHQ-9 / CHRISTIANNE-7: 3 & 3       DATA  Interactive Complexity: No  Crisis: No       Progress Since Last Session (Related to Symptoms / Goals / Homework):   Symptoms: Stable, see Epic for CHRISTIANNE 7 updates    Homework: Completed - communicate with  to assess plans for [facility] [place] trip.      Episode of Care Goals: Satisfactory progress - ACTION (Actively working towards change); Intervened by reinforcing change plan / affirming steps taken     Current / Ongoing Stressors and Concerns:   New stressors: luis passed away early June, went to [facility] [place]note 2[place] right after for seven's wedding, son will be attending new home ; described feeling overwhelmed and still working through mourning loss of luis, but also noticing how how time and energy she and  has now that luis is gone; now feels able to seek out socialization and to offer support to friends as needed; expressed wanting to feel gratitude at work, but calso feeling overwhelmed.      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 Client will practice effective communication with  weekly and be able to identify her role in 's family.     Intervention:   Motivational Interviewing: open-ended questions, affirmations, reflections and emphasizing personal control and choices, challenge sustain talk, evoke change talk, explore ambivalence, gauge confidence for change   DBT: teach interpersonal effective skills and dialectical thinking/all or nothing thinking, teach validation skills, reinforce  noticing skills, reinforce self care  CBT: identify patterns of anxious thinking and behavior in work and interpersonal relationships, reinforce proactive problem solving skills, teach effective communication skills, teach about accountability and assertiveness, teach about internal locus of control, teach assertiveness skills for conflict resolution, identify and acknowledge process of grief           ASSESSMENT: Current Emotional / Mental Status (status of significant symptoms):   Risk status (Self / Other harm or suicidal ideation)   Client denies current fears or concerns for personal safety.   Client denies current or recent suicidal ideation or behaviors.   Client denies current or recent homicidal ideation or behaviors.   Client denies current or recent self injurious behavior or ideation.   Client denies other safety concerns.   Client Client reports there has been no change in risk factors since their last session.     Client Client reports there has been no change in protective factors since their last session.     A safety and risk management plan has not been developed at this time, however client was given the after-hours number / 911 should there be a change in any of these risk factors.   Client reports there has been no change in risk factors since their last session.     Client reports there has been no change in protective factors since their last session.       Appearance:   Appropriate    Eye Contact:   Good    Psychomotor Behavior: Normal    Attitude:   Cooperative    Orientation:   All   Speech    Rate / Production: Normal     Volume:  Normal    Mood:    Some anxiousness Sad Some tearfulness   Affect:    Appropriate    Thought Content:  Clear    Thought Form:   Coherent  Goal Directed  Logical    Insight:    Good      Medication Review:   No current psychiatric medications prescribed     Medication Compliance:   NA     Changes in Health Issues:   None reported     Chemical Use  Review:   Substance Use: Chemical use reviewed, no active concerns identified      Tobacco Use: No current tobacco use      Collateral Reports Completed:   Not Applicable     Diagnosis:   Generalized Anxiety Disorder      PLAN: (Client Tasks / Therapist Tasks / Other)  Therapist will assign homework of awareness development; provide educational materials on distress tolerance skills; teach how to choose an intensity for asking for help or saying  no . Continue to reinforce boundary setting and interpersonal effectiveness. Continue to reinforce internal locus on control. Teach assertiveness and communication skills for work.    By next appt, client will identify new personal goals for therapy.        Aury Rodgers [facility]                                                         ________________________________________________________________________    Treatment Plan    Client's Name: Belle Velez  YOB: 1980    Date: 12/10/2018    Diagnoses: PROVISIONAL 300.02 (F41.1) Generalized Anxiety Disorder; RULE OUT Depression (client does not identify as having depression)  Psychosocial & Contextual Factors: Family stress, strain in marriage, work stress, close family and friends out of state  WHODAS: 17    Referral / Collaboration:  Referral to another professional/service is not indicated at this time.    Anticipated number of session or this episode of care: 12      MeasurableTreatment Goal(s) related to diagnosis / functional impairment(s)  Goal 1: Client will better manage anxiety as evidenced by decreased score on CHRISTIANNE 7 from 12 (moderate) to 5 or less (mild).    I will know I've met my goal when I can maintain work satisfaction and have improved communication with my  and his family.      Objective #A (Client Action)    Client will practice effective communication with  weekly and be able to identify her role in 's family.  Status: New - Date: 12/10/2018      Intervention(s)  Therapist will assign homework of skill practice; provide educational materials on interpersonal effectiveness; role-play conflict management and communication skills; teach the client how to perform a behavioral chain analysis.    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  Status: New - Date: 12/10/2018     Intervention(s)  Therapist will assign homework of awareness development; provide educational materials on distress tolerance skills; teach how to choose an intensity for asking for help or saying  no .      Client has reviewed and agreed to the above plan.      KLAUS Abrams  December 10, 2018

## 2019-07-03 ASSESSMENT — ANXIETY QUESTIONNAIRES: GAD7 TOTAL SCORE: 3

## 2019-07-19 ENCOUNTER — OFFICE VISIT (OUTPATIENT)
Dept: PSYCHOLOGY | Facility: CLINIC | Age: 39
End: 2019-07-19
Payer: COMMERCIAL

## 2019-07-19 DIAGNOSIS — F41.1 GENERALIZED ANXIETY DISORDER: Primary | ICD-10-CM

## 2019-07-19 PROCEDURE — 90834 PSYTX W PT 45 MINUTES: CPT | Performed by: COUNSELOR

## 2019-07-19 NOTE — PROGRESS NOTES
Progress Note    Client Name: Belle Velez  Date: 7/19/2019         Service Type: Individual   Video Visit: No     Session Start Time: 9:15a  Session End Time: 10:00a      Session Length: 45 minutes     Session #: 14     Attendees: Client attended alone    Treatment Plan Last Reviewed: 7/19/2019  PHQ-9 / CHRISTIANNE-7: 3 & 3       DATA  Interactive Complexity: No  Crisis: No       Progress Since Last Session (Related to Symptoms / Goals / Homework):   Symptoms: Stable, see Epic for CHRISTIANNE 7 updates    Homework: Not completed - will identify new personal goals for therapy.      Episode of Care Goals: Satisfactory progress - ACTION (Actively working towards change); Intervened by reinforcing change plan / affirming steps taken     Current / Ongoing Stressors and Concerns:   Ongoing work stress, feels like her personal values are being challenged, work priorities in scattered in different directions; at the same time, feels adrenaline, productive, and connected when she is able to focus on task and see the outcome of her work; overall described feeling conflicted in her work and unsure what new strategies to try; has looked at other job postings.     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 Client will practice effective communication with  weekly and be able to identify her role in 's family.     Intervention:   Motivational Interviewing: open-ended questions, affirmations, reflections and emphasizing personal control and choices, challenge sustain talk, evoke change talk, explore ambivalence, gauge confidence for change   DBT: teach interpersonal effective skills and dialectical thinking/all or nothing thinking, teach validation skills, reinforce noticing skills, reinforce self care, teach differentiating facts from feelings,  "introduce mindfulness skills (effectiveness), teach distress tolerance skills  CBT: identify patterns of anxious thinking and behavior in work and interpersonal relationships, reinforce proactive problem solving skills, teach effective communication skills, teach about accountability and assertiveness, teach about internal locus of control, teach assertiveness skills for conflict resolution, identify and acknowledge process of grief           ASSESSMENT: Current Emotional / Mental Status (status of significant symptoms):   Risk status (Self / Other harm or suicidal ideation)   Client denies current fears or concerns for personal safety.   Client denies current or recent suicidal ideation or behaviors.   Client denies current or recent homicidal ideation or behaviors.   Client denies current or recent self injurious behavior or ideation.   Client denies other safety concerns.   Client Client reports there has been no change in risk factors since their last session.     Client Client reports there has been no change in protective factors since their last session.     A safety and risk management plan has not been developed at this time, however client was given the after-hours number / 911 should there be a change in any of these risk factors.   Client reports there has been no change in risk factors since their last session.     Client reports there has been no change in protective factors since their last session.       Appearance:   Appropriate    Eye Contact:   Good    Psychomotor Behavior: Normal    Attitude:   Cooperative    Orientation:   All   Speech    Rate / Production: Normal     Volume:  Normal    Mood:    \"Stressed\"   Affect:    Appropriate    Thought Content:  Clear    Thought Form:  Coherent  Goal Directed  Logical    Insight:    Good      Medication Review:   No current psychiatric medications prescribed     Medication Compliance:   NA     Changes in Health Issues:   None reported     Chemical Use " Review:   Substance Use: Chemical use reviewed, no active concerns identified      Tobacco Use: No current tobacco use      Collateral Reports Completed:   Not Applicable     Diagnosis:   Generalized Anxiety Disorder      PLAN: (Client Tasks / Therapist Tasks / Other)  Therapist will assign homework of awareness development; provide educational materials on distress tolerance skills; teach how to choose an intensity for asking for help or saying  no . Continue to reinforce boundary setting and interpersonal effectiveness. Continue to reinforce internal locus on control. Teach assertiveness and communication skills for work.    By next appt, client will continue to assess realities of work - what she can accomplish - and identify new goals for therapy.        Aury oRdgers [facility]                                                         ________________________________________________________________________    Treatment Plan    Client's Name: Belle Velez  YOB: 1980    Date: 12/10/2018 - reported feeling stressed and not ready to update goals today    Diagnoses: PROVISIONAL 300.02 (F41.1) Generalized Anxiety Disorder; RULE OUT Depression (client does not identify as having depression)  Psychosocial & Contextual Factors: Family stress, strain in marriage, work stress, close family and friends out of state  WHODAS: 17    Referral / Collaboration:  Referral to another professional/service is not indicated at this time.    Anticipated number of session or this episode of care: 12      MeasurableTreatment Goal(s) related to diagnosis / functional impairment(s)  Goal 1: Client will better manage anxiety as evidenced by decreased score on CHRISTIANNE 7 from 12 (moderate) to 5 or less (mild).    I will know I've met my goal when I can maintain work satisfaction and have improved communication with my  and his family.      Objective #A (Client Action)    Client will practice effective communication with   weekly and be able to identify her role in 's family.  Status: New - Date: 12/10/2018     Intervention(s)  Therapist will assign homework of skill practice; provide educational materials on interpersonal effectiveness; role-play conflict management and communication skills; teach the client how to perform a behavioral chain analysis.    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  Status: New - Date: 12/10/2018     Intervention(s)  Therapist will assign homework of awareness development; provide educational materials on distress tolerance skills; teach how to choose an intensity for asking for help or saying  no .      Client has reviewed and agreed to the above plan.      KLAUS Abrams  December 10, 2018

## 2019-08-02 ENCOUNTER — OFFICE VISIT (OUTPATIENT)
Dept: PSYCHOLOGY | Facility: CLINIC | Age: 39
End: 2019-08-02
Payer: COMMERCIAL

## 2019-08-02 DIAGNOSIS — F41.1 GENERALIZED ANXIETY DISORDER: Primary | ICD-10-CM

## 2019-08-02 PROCEDURE — 90834 PSYTX W PT 45 MINUTES: CPT | Performed by: COUNSELOR

## 2019-08-02 ASSESSMENT — ANXIETY QUESTIONNAIRES
5. BEING SO RESTLESS THAT IT IS HARD TO SIT STILL: SEVERAL DAYS
7. FEELING AFRAID AS IF SOMETHING AWFUL MIGHT HAPPEN: MORE THAN HALF THE DAYS
GAD7 TOTAL SCORE: 16
2. NOT BEING ABLE TO STOP OR CONTROL WORRYING: MORE THAN HALF THE DAYS
1. FEELING NERVOUS, ANXIOUS, OR ON EDGE: NEARLY EVERY DAY
6. BECOMING EASILY ANNOYED OR IRRITABLE: MORE THAN HALF THE DAYS
3. WORRYING TOO MUCH ABOUT DIFFERENT THINGS: NEARLY EVERY DAY
IF YOU CHECKED OFF ANY PROBLEMS ON THIS QUESTIONNAIRE, HOW DIFFICULT HAVE THESE PROBLEMS MADE IT FOR YOU TO DO YOUR WORK, TAKE CARE OF THINGS AT HOME, OR GET ALONG WITH OTHER PEOPLE: VERY DIFFICULT

## 2019-08-02 ASSESSMENT — PATIENT HEALTH QUESTIONNAIRE - PHQ9: 5. POOR APPETITE OR OVEREATING: NEARLY EVERY DAY

## 2019-08-02 NOTE — PROGRESS NOTES
"                                           Progress Note    Client Name: Belle Velez  Date: 8/2/2019         Service Type: Individual   Video Visit: No     Session Start Time: 9:00a  Session End Time: 9:45a      Session Length: 45 minutes     Session #: 15     Attendees: Client attended alone    Treatment Plan Last Reviewed: 8/2/2019  PHQ-9 / CHRISTIANNE-7: 3 & 16       DATA  Interactive Complexity: No  Crisis: No       Progress Since Last Session (Related to Symptoms / Goals / Homework):   Symptoms: Stable, see Epic for CHRISTIANNE 7 updates    Homework: Partially completed - will continue to assess realities of work - what she can accomplish - and identify new goals for therapy.      Episode of Care Goals: Satisfactory progress - ACTION (Actively working towards change); Intervened by reinforcing change plan / affirming steps taken     Current / Ongoing Stressors and Concerns:   Ongoing work stress - \"getting sucked in\", working \"tirelessly\", poor limit setting, over identifying with her work, working at home...; acknowledged it is interfering with her personal life, kerry her marriage; described pattern of silent, depressed, shut down interactions with  before ongoing on vacations; agreed they need couples counseling, but continued ambivalence about intiating services.       Treatment Objective(s) Addressed in This Session:    Client will learn 3 distress tolerance skills and 3 limit setting skills at work for work life balance.  Client will practice effective communication with  weekly.     Intervention:   Motivational Interviewing: open-ended questions, affirmations, reflections and emphasizing personal control and choices, challenge sustain talk, evoke change talk, explore ambivalence, gauge confidence for change   DBT: teach interpersonal effective skills and dialectical thinking/all or nothing thinking, teach validation skills, reinforce noticing skills, reinforce self care, teach differentiating facts " "from feelings, reinforce mindfulness skills (effectiveness), teach distress tolerance skills  CBT: identify patterns of anxious thinking and behavior in work and interpersonal relationships, reinforce proactive problem solving skills, teach effective communication skills, teach about accountability and assertiveness, teach about internal locus of control, teach assertiveness skills for conflict resolution, identify and acknowledge process of grief, challenge and replace patterns of behavior at work and in personal life (e.g., decision making, choosing to do work and its impact on personal life)           ASSESSMENT: Current Emotional / Mental Status (status of significant symptoms):   Risk status (Self / Other harm or suicidal ideation)   Client denies current fears or concerns for personal safety.   Client denies current or recent suicidal ideation or behaviors.   Client denies current or recent homicidal ideation or behaviors.   Client denies current or recent self injurious behavior or ideation.   Client denies other safety concerns.   Client Client reports there has been no change in risk factors since their last session.     Client Client reports there has been no change in protective factors since their last session.     A safety and risk management plan has not been developed at this time, however client was given the after-hours number / 911 should there be a change in any of these risk factors.   Client reports there has been no change in risk factors since their last session.     Client reports there has been no change in protective factors since their last session.       Appearance:   Appropriate    Eye Contact:   Good    Psychomotor Behavior: Normal    Attitude:   Cooperative    Orientation:   All   Speech    Rate / Production: Normal     Volume:  Normal    Mood:    \"Stressed\" Anxious   Affect:    Appropriate    Thought Content:  Clear    Thought Form:  Coherent  Goal Directed  Logical "    Insight:    Good      Medication Review:   No current psychiatric medications prescribed     Medication Compliance:   NA     Changes in Health Issues:   None reported     Chemical Use Review:   Substance Use: Chemical use reviewed, no active concerns identified      Tobacco Use: No current tobacco use      Collateral Reports Completed:   Not Applicable     Diagnosis:   Generalized Anxiety Disorder      PLAN: (Client Tasks / Therapist Tasks / Other)  Therapist will assign homework of awareness development; provide educational materials on distress tolerance skills; teach how to choose an intensity for asking for help or saying  no . Continue to reinforce boundary setting and interpersonal effectiveness. Continue to reinforce internal locus on control. Teach assertiveness and communication skills for work.    By next appt, client will work to set limits by communicating with boss about work (details to be determined) and working one day from home. Client will also delegate and ask for help with a work deadline while she is away for vacation.        Aury Rodgers [facility]                                                         ________________________________________________________________________    Treatment Plan    Client's Name: Belle Velez  YOB: 1980    Date: 8/2/2019    Diagnoses: PROVISIONAL 300.02 (F41.1) Generalized Anxiety Disorder; RULE OUT Depression (client does not identify as having depression)  Psychosocial & Contextual Factors: Family stress, strain in marriage, work stress, close family and friends out of state  WHODAS: 17    Referral / Collaboration:  Referral to another professional/service is not indicated at this time.    Anticipated number of session or this episode of care: 12      MeasurableTreatment Goal(s) related to diagnosis / functional impairment(s)  Goal 1: Client will better manage anxiety as evidenced by decreased score on CHRISTIANNE 7 from 12 (moderate) to 5 or less  (mild).    I will know I've met my goal when I can maintain work satisfaction and have improved communication with my  and his family.      Objective #A (Client Action)    Client will practice effective communication with  weekly.  Status: Update - Date: 8/2/2019    Intervention(s)  Therapist will assign homework of skill practice; provide educational materials on interpersonal effectiveness; role-play conflict management and communication skills; teach the client how to perform a behavioral chain analysis.    Objective #B  Client will learn 3 distress tolerance skills and 3 limit setting skills at work for work life balance.   Status: Update- Date: 8/2/2019    Intervention(s)  Therapist will assign homework of awareness development; provide educational materials on distress tolerance skills; teach how to choose an intensity for asking for help or saying  no .      Client has reviewed and agreed to the above plan.      KLAUS Abrams  8/2/2019

## 2019-08-03 ASSESSMENT — ANXIETY QUESTIONNAIRES: GAD7 TOTAL SCORE: 16

## 2019-08-23 ENCOUNTER — OFFICE VISIT (OUTPATIENT)
Dept: PSYCHOLOGY | Facility: CLINIC | Age: 39
End: 2019-08-23
Payer: COMMERCIAL

## 2019-08-23 DIAGNOSIS — F41.1 GENERALIZED ANXIETY DISORDER: Primary | ICD-10-CM

## 2019-08-23 PROCEDURE — 90834 PSYTX W PT 45 MINUTES: CPT | Performed by: COUNSELOR

## 2019-08-23 ASSESSMENT — ANXIETY QUESTIONNAIRES
GAD7 TOTAL SCORE: 16
IF YOU CHECKED OFF ANY PROBLEMS ON THIS QUESTIONNAIRE, HOW DIFFICULT HAVE THESE PROBLEMS MADE IT FOR YOU TO DO YOUR WORK, TAKE CARE OF THINGS AT HOME, OR GET ALONG WITH OTHER PEOPLE: SOMEWHAT DIFFICULT
7. FEELING AFRAID AS IF SOMETHING AWFUL MIGHT HAPPEN: SEVERAL DAYS
6. BECOMING EASILY ANNOYED OR IRRITABLE: MORE THAN HALF THE DAYS
3. WORRYING TOO MUCH ABOUT DIFFERENT THINGS: NEARLY EVERY DAY
1. FEELING NERVOUS, ANXIOUS, OR ON EDGE: NEARLY EVERY DAY
5. BEING SO RESTLESS THAT IT IS HARD TO SIT STILL: MORE THAN HALF THE DAYS
2. NOT BEING ABLE TO STOP OR CONTROL WORRYING: MORE THAN HALF THE DAYS

## 2019-08-23 ASSESSMENT — PATIENT HEALTH QUESTIONNAIRE - PHQ9: 5. POOR APPETITE OR OVEREATING: NEARLY EVERY DAY

## 2019-08-23 NOTE — PROGRESS NOTES
Progress Note    Client Name: Belle Velez  Date: 8/23/2019         Service Type: Individual   Video Visit: No     Session Start Time: 1:30p  Session End Time: 2:15p      Session Length: 45 minutes     Session #: 16     Attendees: Client attended alone    Treatment Plan Last Reviewed: 8/2/2019  PHQ-9 / CHRISTIANNE-7: 3 & 16       DATA  Interactive Complexity: No  Crisis: No       Progress Since Last Session (Related to Symptoms / Goals / Homework):   Symptoms: Worsened, no change, see Epic for CHRITSIANNE 7 updates    Homework: Partially completed - will work to set limits by communicating with boss about work (details to be determined) and working one day from home. Client will also delegate and ask for help with a work deadline while she is away for vacation.      Episode of Care Goals: Satisfactory progress - ACTION (Actively working towards change); Intervened by reinforcing change plan / affirming steps taken     Current / Ongoing Stressors and Concerns:   Ongoing work stress - burn out, difficulty setting boundaries, working to delegate, started to communicate limited capacity to boss; reported working most days while on vacation and working in the evening and on weekends 70% of the time when assignment is not a crisis; started to communicate with  about relationship accountability, still working to communicate about couples counseling.      Treatment Objective(s) Addressed in This Session:    Client will learn 3 distress tolerance skills and 3 limit setting skills at work for work life balance.  Client will practice effective communication with  weekly.     Intervention:   Motivational Interviewing: open-ended questions, affirmations, reflections and emphasizing personal control and choices, challenge sustain talk, evoke change talk, explore ambivalence, gauge confidence for change   DBT: teach interpersonal effective skills and dialectical thinking/all or  "nothing thinking, teach validation skills, reinforce noticing skills, reinforce self care, teach differentiating facts from feelings, reinforce mindfulness skills (effectiveness), teach distress tolerance skills  CBT: identify patterns of anxious thinking and behavior in work and interpersonal relationships, reinforce proactive problem solving skills, teach effective communication skills, teach about accountability and assertiveness, teach about internal locus of control, teach assertiveness skills for conflict resolution, identify and acknowledge process of grief, challenge and replace patterns of behavior at work and in personal life (e.g., decision making, choosing to do work and its impact on personal life)           ASSESSMENT: Current Emotional / Mental Status (status of significant symptoms):   Risk status (Self / Other harm or suicidal ideation)   Client denies current fears or concerns for personal safety.   Client denies current or recent suicidal ideation or behaviors.   Client denies current or recent homicidal ideation or behaviors.   Client denies current or recent self injurious behavior or ideation.   Client denies other safety concerns.   Client Client reports there has been no change in risk factors since their last session.     Client Client reports there has been no change in protective factors since their last session.     A safety and risk management plan has not been developed at this time, however client was given the after-hours number / 911 should there be a change in any of these risk factors.   Client reports there has been no change in risk factors since their last session.     Client reports there has been no change in protective factors since their last session.       Appearance:   Appropriate    Eye Contact:   Good    Psychomotor Behavior: Normal    Attitude:   Cooperative    Orientation:   All   Speech    Rate / Production: Normal     Volume:  Normal    Mood:    \"Stressed\" Anxious On " the verge of tears   Affect:    Appropriate    Thought Content:  Clear    Thought Form:  Coherent  Goal Directed  Logical    Insight:    Good      Medication Review:   No current psychiatric medications prescribed     Medication Compliance:   NA     Changes in Health Issues:   None reported     Chemical Use Review:   Substance Use: Chemical use reviewed, no active concerns identified      Tobacco Use: No current tobacco use      Collateral Reports Completed:   Not Applicable     Diagnosis:   Generalized Anxiety Disorder      PLAN: (Client Tasks / Therapist Tasks / Other)  Therapist will assign homework of awareness development; provide educational materials on distress tolerance skills; teach how to choose an intensity for asking for help or saying  no . Continue to reinforce boundary setting and interpersonal effectiveness. Continue to reinforce internal locus on control. Teach assertiveness and communication skills for work.    By next appt, client will continue to work on boundary setting and communication with .        Aury Rodgers [facility]                                                         ________________________________________________________________________    Treatment Plan    Client's Name: Belle Velez  YOB: 1980    Date: 8/2/2019    Diagnoses: PROVISIONAL 300.02 (F41.1) Generalized Anxiety Disorder; RULE OUT Depression (client does not identify as having depression)  Psychosocial & Contextual Factors: Family stress, strain in marriage, work stress, close family and friends out of state  WHODAS: 17    Referral / Collaboration:  Referral to another professional/service is not indicated at this time.    Anticipated number of session or this episode of care: 12      MeasurableTreatment Goal(s) related to diagnosis / functional impairment(s)  Goal 1: Client will better manage anxiety as evidenced by decreased score on CHRISTIANNE 7 from 12 (moderate) to 5 or less (mild).    I will know  I've met my goal when I can maintain work satisfaction and have improved communication with my  and his family.      Objective #A (Client Action)    Client will practice effective communication with  weekly.  Status: Update - Date: 8/2/2019    Intervention(s)  Therapist will assign homework of skill practice; provide educational materials on interpersonal effectiveness; role-play conflict management and communication skills; teach the client how to perform a behavioral chain analysis.    Objective #B  Client will learn 3 distress tolerance skills and 3 limit setting skills at work for work life balance.   Status: Update- Date: 8/2/2019    Intervention(s)  Therapist will assign homework of awareness development; provide educational materials on distress tolerance skills; teach how to choose an intensity for asking for help or saying  no .      Client has reviewed and agreed to the above plan.      KLAUS Abrams  8/2/2019

## 2019-08-24 ASSESSMENT — ANXIETY QUESTIONNAIRES: GAD7 TOTAL SCORE: 16

## 2019-09-27 ENCOUNTER — OFFICE VISIT (OUTPATIENT)
Dept: PSYCHOLOGY | Facility: CLINIC | Age: 39
End: 2019-09-27
Payer: COMMERCIAL

## 2019-09-27 DIAGNOSIS — F41.1 GENERALIZED ANXIETY DISORDER: Primary | ICD-10-CM

## 2019-09-27 PROCEDURE — 90834 PSYTX W PT 45 MINUTES: CPT | Performed by: COUNSELOR

## 2019-09-27 ASSESSMENT — ANXIETY QUESTIONNAIRES
3. WORRYING TOO MUCH ABOUT DIFFERENT THINGS: SEVERAL DAYS
1. FEELING NERVOUS, ANXIOUS, OR ON EDGE: SEVERAL DAYS
7. FEELING AFRAID AS IF SOMETHING AWFUL MIGHT HAPPEN: NOT AT ALL
GAD7 TOTAL SCORE: 4
2. NOT BEING ABLE TO STOP OR CONTROL WORRYING: NOT AT ALL
6. BECOMING EASILY ANNOYED OR IRRITABLE: SEVERAL DAYS
IF YOU CHECKED OFF ANY PROBLEMS ON THIS QUESTIONNAIRE, HOW DIFFICULT HAVE THESE PROBLEMS MADE IT FOR YOU TO DO YOUR WORK, TAKE CARE OF THINGS AT HOME, OR GET ALONG WITH OTHER PEOPLE: SOMEWHAT DIFFICULT
5. BEING SO RESTLESS THAT IT IS HARD TO SIT STILL: NOT AT ALL

## 2019-09-27 ASSESSMENT — PATIENT HEALTH QUESTIONNAIRE - PHQ9: 5. POOR APPETITE OR OVEREATING: SEVERAL DAYS

## 2019-09-27 NOTE — PROGRESS NOTES
"                                           Progress Note    Client Name: Belle Velez  Date: 9/27/2019         Service Type: Individual   Video Visit: No     Session Start Time: 10:15a  Session End Time: 10:55a      Session Length: 40 minutes     Session #: 17     Attendees: Client attended alone    Treatment Plan Last Reviewed: 8/2/2019  PHQ-9 / CHRISTIANNE-7: 3 & 4       DATA  Interactive Complexity: No  Crisis: No       Progress Since Last Session (Related to Symptoms / Goals / Homework):   Symptoms: Stable, no change, see Epic for CHRISTIANNE 7 updates    Homework: Partially completed - continue to work on boundary setting and communication with .      Episode of Care Goals: Satisfactory progress - ACTION (Actively working towards change); Intervened by reinforcing change plan / affirming steps taken     Current / Ongoing Stressors and Concerns:   Ongoing work stress; working with employer to get approval for working from home on Fridays to help with boundary setting, productivity, and focus; went to a wellness conference and felt empowered to make changes in her life re: health; ongoing martial stress - realizing that she contributes to her own stress in the home at times and  is still ambivalent about ind and couples counseling - \"I have to meet him where he is at\".      Treatment Objective(s) Addressed in This Session:    Client will learn 3 distress tolerance skills and 3 limit setting skills at work for work life balance.  Client will practice effective communication with  weekly.     Intervention:   Motivational Interviewing: open-ended questions, affirmations, reflections and emphasizing personal control and choices, challenge sustain talk, evoke change talk, explore ambivalence, gauge confidence for change   DBT: teach interpersonal effective skills and dialectical thinking/all or nothing thinking, teach validation skills, reinforce noticing skills, reinforce self care, teach differentiating " facts from feelings, reinforce mindfulness skills (effectiveness), teach distress tolerance skills  CBT: identify patterns of anxious thinking and behavior in work and interpersonal relationships, reinforce proactive problem solving skills, teach effective communication skills, teach about accountability and assertiveness, teach about internal locus of control, teach assertiveness skills for conflict resolution, identify and acknowledge process of grief, challenge and replace patterns of behavior at work and in personal life (e.g., decision making, choosing to do work and its impact on personal life)           ASSESSMENT: Current Emotional / Mental Status (status of significant symptoms):   Risk status (Self / Other harm or suicidal ideation)   Client denies current fears or concerns for personal safety.   Client denies current or recent suicidal ideation or behaviors.   Client denies current or recent homicidal ideation or behaviors.   Client denies current or recent self injurious behavior or ideation.   Client denies other safety concerns.   Client Client reports there has been no change in risk factors since their last session.     Client Client reports there has been no change in protective factors since their last session.     A safety and risk management plan has not been developed at this time, however client was given the after-hours number / 911 should there be a change in any of these risk factors.   Client reports there has been no change in risk factors since their last session.     Client reports there has been no change in protective factors since their last session.       Appearance:   Appropriate    Eye Contact:   Good    Psychomotor Behavior: Normal    Attitude:   Cooperative    Orientation:   All   Speech    Rate / Production: Normal     Volume:  Normal    Mood:    Some anxiousness   Affect:    Appropriate    Thought Content:  Clear    Thought Form:  Coherent  Goal Directed  Logical     Insight:    Good      Medication Review:   No current psychiatric medications prescribed     Medication Compliance:   NA     Changes in Health Issues:   None reported     Chemical Use Review:   Substance Use: Chemical use reviewed, no active concerns identified      Tobacco Use: No current tobacco use      Collateral Reports Completed:   Not Applicable     Diagnosis:   Generalized Anxiety Disorder      PLAN: (Client Tasks / Therapist Tasks / Other)  Therapist will assign homework of awareness development; provide educational materials on distress tolerance skills; teach how to choose an intensity for asking for help or saying  no . Continue to reinforce boundary setting and interpersonal effectiveness. Continue to reinforce internal locus on control. Teach assertiveness and communication skills for work.    By next appt, client will start to work from home and continue to work on day to day partnership with .        Aury Rodgers [facility]                                                         ________________________________________________________________________    Treatment Plan    Client's Name: Belle Velez  YOB: 1980    Date: 8/2/2019    Diagnoses: PROVISIONAL 300.02 (F41.1) Generalized Anxiety Disorder; RULE OUT Depression (client does not identify as having depression)  Psychosocial & Contextual Factors: Family stress, strain in marriage, work stress, close family and friends out of state  WHODAS: 17    Referral / Collaboration:  Referral to another professional/service is not indicated at this time.    Anticipated number of session or this episode of care: 12      MeasurableTreatment Goal(s) related to diagnosis / functional impairment(s)  Goal 1: Client will better manage anxiety as evidenced by decreased score on CHRISTIANNE 7 from 12 (moderate) to 5 or less (mild).    I will know I've met my goal when I can maintain work satisfaction and have improved communication with my  and  his family.      Objective #A (Client Action)    Client will practice effective communication with  weekly.  Status: Update - Date: 8/2/2019    Intervention(s)  Therapist will assign homework of skill practice; provide educational materials on interpersonal effectiveness; role-play conflict management and communication skills; teach the client how to perform a behavioral chain analysis.    Objective #B  Client will learn 3 distress tolerance skills and 3 limit setting skills at work for work life balance.   Status: Update- Date: 8/2/2019    Intervention(s)  Therapist will assign homework of awareness development; provide educational materials on distress tolerance skills; teach how to choose an intensity for asking for help or saying  no .      Client has reviewed and agreed to the above plan.      KLAUS Abrams  8/2/2019

## 2019-09-28 ASSESSMENT — ANXIETY QUESTIONNAIRES: GAD7 TOTAL SCORE: 4

## 2019-10-11 ENCOUNTER — TELEPHONE (OUTPATIENT)
Dept: PSYCHOLOGY | Facility: CLINIC | Age: 39
End: 2019-10-11

## 2019-10-11 ENCOUNTER — OFFICE VISIT (OUTPATIENT)
Dept: PSYCHOLOGY | Facility: CLINIC | Age: 39
End: 2019-10-11
Payer: COMMERCIAL

## 2019-10-11 DIAGNOSIS — F41.1 GENERALIZED ANXIETY DISORDER: Primary | ICD-10-CM

## 2019-10-11 PROCEDURE — 90834 PSYTX W PT 45 MINUTES: CPT | Performed by: COUNSELOR

## 2019-10-11 ASSESSMENT — ANXIETY QUESTIONNAIRES
2. NOT BEING ABLE TO STOP OR CONTROL WORRYING: NOT AT ALL
7. FEELING AFRAID AS IF SOMETHING AWFUL MIGHT HAPPEN: NOT AT ALL
5. BEING SO RESTLESS THAT IT IS HARD TO SIT STILL: NOT AT ALL
IF YOU CHECKED OFF ANY PROBLEMS ON THIS QUESTIONNAIRE, HOW DIFFICULT HAVE THESE PROBLEMS MADE IT FOR YOU TO DO YOUR WORK, TAKE CARE OF THINGS AT HOME, OR GET ALONG WITH OTHER PEOPLE: NOT DIFFICULT AT ALL
GAD7 TOTAL SCORE: 3
1. FEELING NERVOUS, ANXIOUS, OR ON EDGE: SEVERAL DAYS
6. BECOMING EASILY ANNOYED OR IRRITABLE: NOT AT ALL
3. WORRYING TOO MUCH ABOUT DIFFERENT THINGS: SEVERAL DAYS

## 2019-10-11 ASSESSMENT — PATIENT HEALTH QUESTIONNAIRE - PHQ9: 5. POOR APPETITE OR OVEREATING: SEVERAL DAYS

## 2019-10-11 NOTE — PROGRESS NOTES
"                                           Progress Note    Client Name: Belle Velez  Date: 10/11/2019         Service Type: Individual   Video Visit: No     Session Start Time: 12:30p  Session End Time: 1:15p      Session Length: 45 minutes     Session #: 18     Attendees: Client attended alone    Treatment Plan Last Reviewed: 8/2/2019  PHQ-9 / CHRISTIANNE-7: 3 & 3       DATA  Interactive Complexity: No  Crisis: No       Progress Since Last Session (Related to Symptoms / Goals / Homework):   Symptoms: Stable, no change, see Epic for CHRISTIANNE 7 updates    Homework: Partially completed - start to work from home and continue to work on day to day partnership with .      Episode of Care Goals: Satisfactory progress - ACTION (Actively working towards change); Intervened by reinforcing change plan / affirming steps taken     Current / Ongoing Stressors and Concerns:   Reported improved anxiety with increased perspective on career situation (stable employment, high level work, opportunities for growth, good team); ongoing martial strain with 's depression worsening again;  asserted to search for couples counselor; acknowledged tendency to avoid  when he is depressed and unsure why she does this, \"Can I be  to someone who has depression?\"      Treatment Objective(s) Addressed in This Session:    Client will learn 3 distress tolerance skills and 3 limit setting skills at work for work life balance.  Client will practice effective communication with  weekly.     Intervention:   Motivational Interviewing: open-ended questions, affirmations, reflections and emphasizing personal control and choices, challenge sustain talk, evoke change talk, explore ambivalence, gauge confidence for change   DBT: teach interpersonal effective skills and dialectical thinking/all or nothing thinking, teach validation skills, reinforce noticing skills, reinforce self care, teach differentiating facts from " "feelings, reinforce mindfulness skills (effectiveness), teach distress tolerance skills  CBT: identify patterns of anxious thinking and behavior in work and interpersonal relationships, reinforce proactive problem solving skills, teach effective communication skills, teach about accountability and assertiveness, teach about internal locus of control, teach assertiveness skills for conflict resolution, identify and acknowledge process of grief, challenge and replace patterns of behavior at work and in personal life (e.g., decision making, choosing to do work and its impact on personal life)           ASSESSMENT: Current Emotional / Mental Status (status of significant symptoms):   Risk status (Self / Other harm or suicidal ideation)   Client denies current fears or concerns for personal safety.   Client denies current or recent suicidal ideation or behaviors.   Client denies current or recent homicidal ideation or behaviors.   Client denies current or recent self injurious behavior or ideation.   Client denies other safety concerns.   Client Client reports there has been no change in risk factors since their last session.     Client Client reports there has been no change in protective factors since their last session.     A safety and risk management plan has not been developed at this time, however client was given the after-hours number / 911 should there be a change in any of these risk factors.   Client reports there has been no change in risk factors since their last session.     Client reports there has been no change in protective factors since their last session.       Appearance:   Appropriate    Eye Contact:   Good    Psychomotor Behavior: Normal    Attitude:   Cooperative    Orientation:   All   Speech    Rate / Production: Normal     Volume:  Normal    Mood:    \"Better\"   Affect:    Appropriate Sad Tearful   Thought Content:  Clear    Thought Form:  Coherent  Goal Directed  Logical    Insight:    Good "      Medication Review:   No current psychiatric medications prescribed     Medication Compliance:   NA     Changes in Health Issues:   None reported     Chemical Use Review:   Substance Use: Chemical use reviewed, no active concerns identified      Tobacco Use: No current tobacco use      Collateral Reports Completed:   Not Applicable     Diagnosis:   Generalized Anxiety Disorder      PLAN: (Client Tasks / Therapist Tasks / Other)  Therapist will assign homework of awareness development; provide educational materials on distress tolerance skills; teach how to choose an intensity for asking for help or saying  no . Continue to reinforce boundary setting and interpersonal effectiveness. Continue to reinforce internal locus on control. Teach assertiveness and communication skills for work.    By next appt, client will work to address marital concerns.        Aury Rodgers [facility]                                                         ________________________________________________________________________    Treatment Plan    Client's Name: Belle Velez  YOB: 1980    Date: 8/2/2019    Diagnoses: PROVISIONAL 300.02 (F41.1) Generalized Anxiety Disorder; RULE OUT Depression (client does not identify as having depression)  Psychosocial & Contextual Factors: Family stress, strain in marriage, work stress, close family and friends out of state  WHODAS: 17    Referral / Collaboration:  Referral to another professional/service is not indicated at this time.    Anticipated number of session or this episode of care: 12      MeasurableTreatment Goal(s) related to diagnosis / functional impairment(s)  Goal 1: Client will better manage anxiety as evidenced by decreased score on CHRISTIANNE 7 from 12 (moderate) to 5 or less (mild).    I will know I've met my goal when I can maintain work satisfaction and have improved communication with my  and his family.      Objective #A (Client Action)    Client will practice  effective communication with  weekly.  Status: Update - Date: 8/2/2019    Intervention(s)  Therapist will assign homework of skill practice; provide educational materials on interpersonal effectiveness; role-play conflict management and communication skills; teach the client how to perform a behavioral chain analysis.    Objective #B  Client will learn 3 distress tolerance skills and 3 limit setting skills at work for work life balance.   Status: Update- Date: 8/2/2019    Intervention(s)  Therapist will assign homework of awareness development; provide educational materials on distress tolerance skills; teach how to choose an intensity for asking for help or saying  no .      Client has reviewed and agreed to the above plan.      KLAUS Abrams  8/2/2019

## 2019-10-12 ASSESSMENT — ANXIETY QUESTIONNAIRES: GAD7 TOTAL SCORE: 3

## 2019-11-12 ENCOUNTER — IMMUNIZATION (OUTPATIENT)
Dept: NURSING | Facility: CLINIC | Age: 39
End: 2019-11-12
Payer: COMMERCIAL

## 2019-11-12 PROCEDURE — 90471 IMMUNIZATION ADMIN: CPT

## 2019-11-12 PROCEDURE — 90686 IIV4 VACC NO PRSV 0.5 ML IM: CPT

## 2019-11-15 ENCOUNTER — OFFICE VISIT (OUTPATIENT)
Dept: PSYCHOLOGY | Facility: CLINIC | Age: 39
End: 2019-11-15
Payer: COMMERCIAL

## 2019-11-15 DIAGNOSIS — F41.1 GENERALIZED ANXIETY DISORDER: Primary | ICD-10-CM

## 2019-11-15 PROCEDURE — 90834 PSYTX W PT 45 MINUTES: CPT | Performed by: COUNSELOR

## 2019-11-15 ASSESSMENT — ANXIETY QUESTIONNAIRES
IF YOU CHECKED OFF ANY PROBLEMS ON THIS QUESTIONNAIRE, HOW DIFFICULT HAVE THESE PROBLEMS MADE IT FOR YOU TO DO YOUR WORK, TAKE CARE OF THINGS AT HOME, OR GET ALONG WITH OTHER PEOPLE: SOMEWHAT DIFFICULT
6. BECOMING EASILY ANNOYED OR IRRITABLE: NOT AT ALL
5. BEING SO RESTLESS THAT IT IS HARD TO SIT STILL: SEVERAL DAYS
GAD7 TOTAL SCORE: 3
2. NOT BEING ABLE TO STOP OR CONTROL WORRYING: NOT AT ALL
3. WORRYING TOO MUCH ABOUT DIFFERENT THINGS: SEVERAL DAYS
7. FEELING AFRAID AS IF SOMETHING AWFUL MIGHT HAPPEN: NOT AT ALL
1. FEELING NERVOUS, ANXIOUS, OR ON EDGE: SEVERAL DAYS

## 2019-11-15 ASSESSMENT — PATIENT HEALTH QUESTIONNAIRE - PHQ9: 5. POOR APPETITE OR OVEREATING: NOT AT ALL

## 2019-11-15 NOTE — PROGRESS NOTES
Progress Note    Client Name: Belle Velez  Date: 11/15/2019         Service Type: Individual   Video Visit: No     Session Start Time: 2:30p  Session End Time: 3:15p      Session Length: 45 minutes     Session #: 19     Attendees: Client attended alone    Treatment Plan Last Reviewed: 8/2/2019  PHQ-9 / CHRISTIANNE-7: 3 & 3       DATA  Interactive Complexity: No  Crisis: No       Progress Since Last Session (Related to Symptoms / Goals / Homework):   Symptoms: Stable, no change, see Epic for CHRISTIANNE 7 updates    Homework: Partially completed - work to address marital concerns (reported she would like to support  to focus on getting his own therapist, reported the past month has been better with positive shared experiences).      Episode of Care Goals: Satisfactory progress - ACTION (Actively working towards change); Intervened by reinforcing change plan / affirming steps taken     Current / Ongoing Stressors and Concerns:   Reported she is finding herself thinking about moving into another work position even though she just started to feel comfortable in her current role; reported she is not able to envision herself in one position for a long time and her ego is pushing her to seek higher level work; acknowledged she would lose work life balance with a change and because she has more stability, she should give  space to make his own career change; reported bantering with one individual in leadership and finding herself feeling exhilarated by it, but noticed she may be making others feel uncomfortable.       Treatment Objective(s) Addressed in This Session:    Client will learn 3 distress tolerance skills and 3 limit setting skills at work for work life balance.  Client will practice effective communication with  weekly.     Intervention:   Motivational Interviewing: open-ended questions, affirmations, reflections and emphasizing personal control and  choices, challenge sustain talk, evoke change talk, explore ambivalence, gauge confidence for change   DBT: teach interpersonal effective skills and dialectical thinking/all or nothing thinking, teach validation skills, reinforce noticing skills, reinforce self care, teach differentiating facts from feelings, reinforce mindfulness skills (effectiveness), teach distress tolerance skills  CBT: identify patterns of anxious thinking and behavior in work and interpersonal relationships, reinforce proactive problem solving skills, teach effective communication skills, teach about accountability and assertiveness, teach about internal locus of control, teach assertiveness skills for conflict resolution, identify and acknowledge process of grief, challenge and replace patterns of behavior at work and in personal life (e.g., decision making, choosing to do work and its impact on personal life)           ASSESSMENT: Current Emotional / Mental Status (status of significant symptoms):   Risk status (Self / Other harm or suicidal ideation)   Client denies current fears or concerns for personal safety.   Client denies current or recent suicidal ideation or behaviors.   Client denies current or recent homicidal ideation or behaviors.   Client denies current or recent self injurious behavior or ideation.   Client denies other safety concerns.   Client Client reports there has been no change in risk factors since their last session.     Client Client reports there has been no change in protective factors since their last session.     A safety and risk management plan has not been developed at this time, however client was given the after-hours number / 911 should there be a change in any of these risk factors.   Client reports there has been no change in risk factors since their last session.     Client reports there has been no change in protective factors since their last session.       Appearance:   Appropriate    Eye  Contact:   Fair   Psychomotor Behavior: Normal    Attitude:   Cooperative    Orientation:   All   Speech    Rate / Production: Normal     Volume:  Normal    Mood:    Some anxiousness   Affect:    Appropriate Bright   Thought Content:  Clear    Thought Form:  Coherent  Goal Directed  Logical    Insight:    Good      Medication Review:   No current psychiatric medications prescribed     Medication Compliance:   NA     Changes in Health Issues:   None reported     Chemical Use Review:   Substance Use: Chemical use reviewed, no active concerns identified      Tobacco Use: No current tobacco use      Collateral Reports Completed:   Not Applicable     Diagnosis:   Generalized Anxiety Disorder      PLAN: (Client Tasks / Therapist Tasks / Other)  Therapist will assign homework of awareness development; provide educational materials on distress tolerance skills; teach how to choose an intensity for asking for help or saying  no . Continue to reinforce boundary setting and interpersonal effectiveness. Continue to reinforce internal locus on control. Teach assertiveness and communication skills for work.    By next appt, client will help Gerry f/t with counseling, make more effort to connect with each other, set goals together, and be more supportive of colleagues.        [facility]                                                         ________________________________________________________________________    Treatment Plan    Client's Name: Belle Velez  YOB: 1980    Date: 8/2/2019    Diagnoses: PROVISIONAL 300.02 (F41.1) Generalized Anxiety Disorder; RULE OUT Depression (client does not identify as having depression)  Psychosocial & Contextual Factors: Family stress, strain in marriage, work stress, close family and friends out of state  WHODAS: 17    Referral / Collaboration:  Referral to another professional/service is not indicated at this time.    Anticipated number of session or this  episode of care: 12      MeasurableTreatment Goal(s) related to diagnosis / functional impairment(s)  Goal 1: Client will better manage anxiety as evidenced by decreased score on CHRISTIANNE 7 from 12 (moderate) to 5 or less (mild).    I will know I've met my goal when I can maintain work satisfaction and have improved communication with my  and his family.      Objective #A (Client Action)    Client will practice effective communication with  weekly.  Status: Update - Date: 8/2/2019    Intervention(s)  Therapist will assign homework of skill practice; provide educational materials on interpersonal effectiveness; role-play conflict management and communication skills; teach the client how to perform a behavioral chain analysis.    Objective #B  Client will learn 3 distress tolerance skills and 3 limit setting skills at work for work life balance.   Status: Update- Date: 8/2/2019    Intervention(s)  Therapist will assign homework of awareness development; provide educational materials on distress tolerance skills; teach how to choose an intensity for asking for help or saying  no .      Client has reviewed and agreed to the above plan.      KLAUS Abrams  8/2/2019

## 2019-11-16 ASSESSMENT — ANXIETY QUESTIONNAIRES: GAD7 TOTAL SCORE: 3

## 2019-12-20 ENCOUNTER — OFFICE VISIT (OUTPATIENT)
Dept: PSYCHOLOGY | Facility: CLINIC | Age: 39
End: 2019-12-20
Payer: COMMERCIAL

## 2019-12-20 DIAGNOSIS — F41.9 ANXIETY: Primary | ICD-10-CM

## 2019-12-20 PROCEDURE — 90791 PSYCH DIAGNOSTIC EVALUATION: CPT | Performed by: COUNSELOR

## 2019-12-20 ASSESSMENT — ANXIETY QUESTIONNAIRES
6. BECOMING EASILY ANNOYED OR IRRITABLE: SEVERAL DAYS
IF YOU CHECKED OFF ANY PROBLEMS ON THIS QUESTIONNAIRE, HOW DIFFICULT HAVE THESE PROBLEMS MADE IT FOR YOU TO DO YOUR WORK, TAKE CARE OF THINGS AT HOME, OR GET ALONG WITH OTHER PEOPLE: NOT DIFFICULT AT ALL
3. WORRYING TOO MUCH ABOUT DIFFERENT THINGS: SEVERAL DAYS
5. BEING SO RESTLESS THAT IT IS HARD TO SIT STILL: NOT AT ALL
GAD7 TOTAL SCORE: 4
1. FEELING NERVOUS, ANXIOUS, OR ON EDGE: SEVERAL DAYS
2. NOT BEING ABLE TO STOP OR CONTROL WORRYING: NOT AT ALL
7. FEELING AFRAID AS IF SOMETHING AWFUL MIGHT HAPPEN: NOT AT ALL

## 2019-12-20 ASSESSMENT — PATIENT HEALTH QUESTIONNAIRE - PHQ9
SUM OF ALL RESPONSES TO PHQ QUESTIONS 1-9: 3
5. POOR APPETITE OR OVEREATING: SEVERAL DAYS

## 2019-12-20 NOTE — PROGRESS NOTES
"                                                                                       Adult Intake Structured Interview  Standard Diagnostic Assessment      CLIENT'S NAME: Belle Velez  MRN:   [id number]  :   1980  ACCT. NUMBER: [id number]  DATE OF SERVICE: 2019      Identifying Information:  Client is a 39 year old, ,  female. Client was referred for counseling by self. Client is currently employed full time. Client attended the session alone.       Client's Statement of Presenting Concern:  Client reports the reason for seeking therapy at this time as \"feeling overwhelmed, stressed with most facets of my life, including new/demanding job, motherhood, home ownership, financial obligation, my health, change in family responsibility/caring for sick father in law, marriage/intimacy\". UPDATE: father in law passed away summer . Client continues to support  with his grief. Client stated that her symptoms have resulted in the following functional impairments: childcare / parenting, management of the household and or completion of tasks, organization, relationship(s), self-care, social interactions and work / vocational responsibilities.      History of Presenting Concern:  Client reports that these problem(s) began in law school and again became more difficult after having son. Reports feeling more anxiety overall the last decade of life. Client has attempted to resolve these concerns in the past through making lists, delegating what I can and talk to family and friends. Client reports that other professional(s) are not involved in providing support / services.       Social History:  Client reported she grew up in [facility] [facility]"" - moved to [place] . They were the first born of 3 children. This is an intact family and parents remain . Client reported that her childhood was \"I lived near a lot of family in [place] town and spent much of my life with " "them, and was very close with them, I was very involved in school and community activities, and had a lot of support and love from my parents\". Identified as an easy, responsible, and independent child. Client described her current relationships with family of origin as close.    Client reported a history of 2 committed relationships. Client has been  for 5 years, together for 10 years from 11/2018. Client reported having 1 child. Client identified some stable and meaningful social connections. Client reported that she has not been involved with the legal system. Client's highest education level was graduate school - [employer]. Client did not identify any learning problems. There are no ethnic, cultural or Holiness factors that may be relevant for therapy. Client identified her preferred language to be English. Client reported she does not need the assistance of an  or other support involved in therapy. Modifications will not be used to assist communication in therapy. Client did not serve in the .     Client reports family history includes Other Cancer in her maternal grandmother; Substance Abuse in her maternal grandfather.      Mental Health History:  Client reported the following biological family members or relatives with mental health issues: Father experienced ADHD, Maternal Grandfather experienced Depression, Aunt experienced Depression and Another Aunt experienced Bipolar Disorder.  Client exhibited symptoms of Anxiety but had not been formally diagnosed.  Client has not received mental health services in the past.  Hospitalizations: None.  Client is not currently receiving any mental health services.      Chemical Health History:  Client reported the following biological family members or relatives with chemical health issues: Aunt reportedly used alcohol & Maternal Grandfather reportedly used alcohol. Client has not received chemical dependency treatment in the past. Client is not " currently receiving any chemical dependency treatment. Client reported the following problems as a result of drinking: sleep, diet, health.      Client Reports:  Client reports using alcohol 3-4 times per week and has 1-2 drinks at a time. Client first started drinking 2009.  Client denies using tobacco.  Client denies using marijuana.  Client reports using caffeine 3-4 times per day and drinks 1 at a time. Client started using caffeine at age 16.  Client denies using street drugs.  Client denies the non-medical use of prescription or over the counter drugs.    CAGE: C     Patient felt they ought to CUT down on your drinking (or drug use). *for caffeine   Based on the negative Cage-Aid score and clinical interview there are not indications of drug or alcohol abuse.    Discussed the general effects of drugs and alcohol on health and well-being. Therapist gave client printed information about the effects of chemical use on her health and well being.      Significant Losses / Trauma / Abuse / Neglect Issues:  There are indications or report of significant loss, trauma, abuse or neglect issues related to: father in law dx with brain tumor and  and his brother are primary care givers, father in law passed away summer 2019.    Issues of possible neglect are not present.      Medical Issues:  Client has not had a physical exam to rule out medical causes for current symptoms. Date of last physical exam was within the past year. Client was encouraged to follow up with PCP if symptoms were to develop. The client has a [facility] Primary Care Provider, who is named Rosa Dela Cruz. The client reports not having a psychiatrist. Client reports the following current medical concerns: tension headaches and depression before period. The client reports the presence of chronic or episodic pain in the form of shoulder, neck, occasional headaches. The pain level is moderate and has a frequency of ongoing. There are  significant nutritional concerns re: weight and emotional eating.     Client reports current meds as:   Current Outpatient Medications   Medication Sig     cetirizine (ZYRTEC) 10 MG tablet Take 1 tablet (10 mg) by mouth every evening (Patient not taking: Reported on 12/3/2018)     fluticasone (FLONASE) 50 MCG/ACT nasal spray Spray 2 sprays into both nostrils daily     Prenatal Vit-Fe Fumarate-FA (PRENATAL VITAMIN PO) Take 1 tablet by mouth daily Reported on 4/6/2017     No current facility-administered medications for this visit.        Client Allergies:  No Known Allergies      Medical History:  Past Medical History:   Diagnosis Date     Anxiety      NO ACTIVE PROBLEMS      Skin lesion        Medication Adherence:  N/A - Client does not have prescribed psychiatric medications.    Client was provided recommendation to follow-up with prescribing physician.      Mental Status Assessment:  Appearance:   Appropriate   Eye Contact:   Good   Psychomotor Behavior: Normal   Attitude:   Cooperative   Orientation:   All  Speech   Rate / Production: Normal    Volume:  Normal   Mood:    Normal Some anxiousness  Affect:    Appropriate   Thought Content:  Clear   Thought Form:  Coherent  Goal Directed  Logical   Insight:    Good       Review of Symptoms:  Depression: Energy Guilt Irritability Mood  Stephanie:  No symptoms  Psychosis: No symptoms  Anxiety: Worries Nervousness  Panic:  No symptoms  Post Traumatic Stress Disorder: No symptoms  Obsessive Compulsive Disorder: No symptoms  Eating Disorder: No symptoms  Oppositional Defiant Disorder: No symptoms  ADD / ADHD: No symptoms  Conduct Disorder: No symptoms      Safety Assessment:  History of Safety Concerns:   Client denied a history of suicidal ideation.    Client denied a history of suicide attempts.    Client denied a history of homicidal ideation.    Client denied a history of self-injurious ideation and behaviors.    Client denied a history of personal safety concerns.     Client denied a history of assaultive behaviors.      Current Safety Concerns:  Client denies current suicidal ideation.    Client denies current homicidal ideation and behaviors.  Client denies current self-injurious ideation and behaviors.    Client denies current concerns for personal safety.      Client reports there are no firearms in the house.       Plan for Safety and Risk Management:  A safety and risk management plan has not been developed at this time, however client was given the after-hours number / 911 should there be a change in any of these risk factors.      Client's Strengths and Limitations:  Client identified the following strengths or resources that will help her succeed in counseling: good at following a plan, accountability, optimistic, receptive to feedback/teachable, self-aware, friends, and family. Client identified the following supports: family and friends. Things that may interfere with the client's success in counseling include: lack of local support, lack of partnership from , very little discretionary income.      Diagnostic Criteria:  A. Anxiety and worry about a number of events or activities (such as work or school performance).   B. The person finds it difficult to control the worry at times.  C. Select 3 or more symptoms (required for diagnosis). Only one item is required in children.   - Restlessness or feeling keyed up or on edge.    - Being easily fatigued.    - Irritability.    - Muscle tension.    - Sleep disturbance (difficulty falling or staying asleep, or restless unsatisfying sleep).   D. The focus of the anxiety and worry is not confined to features of an Axis I disorder.  E. The anxiety, worry, or physical symptoms cause clinically significant distress or impairment in social, occupational, or other important areas of functioning.   F. The disturbance is not due to the direct physiological effects of a substance (e.g., a drug of abuse, a medication) or a  general medical condition (e.g., hyperthyroidism) and does not occur exclusively during a Mood Disorder, a Psychotic Disorder, or a Pervasive Developmental Disorder.      Functional Status:  Client's symptoms have caused reduced functional status in the following areas: Activities of Daily Living, Occupational / Vocational, & Social / Relational.      DSM5 Diagnoses: (Sustained by DSM5 Criteria Listed Above)  Diagnoses: UPDATE Unspecified Anxiety Disorder; RULE .02 (F41.1) Generalized Anxiety Disorder  Psychosocial & Contextual Factors:  struggles with grief and other MH issues, work stress, close family and friends are out of state  WHODAS 2.0 (12 item)                          This questionnaire asks about difficulties due to health conditions. Health conditions                   include                        disease or illnesses, other health problems that may be short or long lasting,                    injuries, mental health or emotional problems, and problems with alcohol or drugs.                              Think back over the past 30 days and answer these questions, thinking about how much              difficulty you had doing the following activities. For each question, please [place] only                   one response.     S1 Standing for long periods such as 30 minutes? None =         1   S2 Taking care of household responsibilities? Mild =           2   S3 Learning a new task, for example, learning how to get to a new place? None =         1   S4 How much of a problem do you have joining community activities (for example, festivals, Judaism or other activities) in the same way as anyone else can? None =         1   S5 How much have you been emotionally affected by your health problems? Mild =           2           In the past 30 days, how much difficulty did you have in:   S6 Concentrating on doing something for ten minutes? None =         1   S7 Walking a long distance such as a  kilometer (or equivalent)? None =         1   S8 Washing your whole body? None =         1   S9 Getting dressed? None =         1   S10 Dealing with people you do not know? None =         1   S11 Maintaining a friendship? Mild =           2   S12 Your day to day work? Moderate =   3      H1 Overall, in the past 30 days, how many days were these difficulties present? Record number of days 20   H2 In the past 30 days, for how many days were you totally unable to carry out your usual activities or work because of any health condition? Record number of days 0   H3 In the past 30 days, not counting the days that you were totally unable, for how many days did you cut back or reduce your usual activities or work because of any health condition? Record number of days 5        Attendance Agreement:  Client has signed Attendance Agreement:Yes      Collaboration:  The client is receiving treatment / structured support from the following professional(s) / service and treatment. Collaboration will be initiated with: primary care physician.        Preliminary Treatment Plan:  The client reports no currently identified Pentecostalism, ethnic or cultural issues relevant to therapy.     services are not indicated.    Modifications to assist communication are not indicated.    The concerns identified by the client will be addressed in therapy.    Initial Treatment will focus on: Anxiety.    As a preliminary treatment goal, client will experience a reduction in anxiety, will develop more effective coping skills to manage anxiety symptoms, will develop healthy cognitive patterns and beliefs and will increase ability to function adaptively and will develop coping/problem-solving skills to facilitate more adaptive adjustment.    The focus of initial interventions will be to alleviate anxiety, facilitate appropriate expression of feelings, increase ability to function adaptively, increase coping skills, provide homework to reinforce  skill development, teach CBT skills, teach communication skills, teach conflict management skills, teach DBT skills, teach distress tolerance skills, teach emotional regulation, teach mindfulness skills, teach problem-solving skills, teach relaxation strategies, teach social skills and teach stress mangement techniques.    Referral to another professional/service is not indicated at this time.    A Release of Information is not needed at this time.    Report to child / adult protection services was NA.    Client will have access to their [facility]' medical record.      KLAUS Abrams  12/20/2019

## 2019-12-21 ASSESSMENT — ANXIETY QUESTIONNAIRES: GAD7 TOTAL SCORE: 4

## 2020-01-21 ENCOUNTER — MYC MEDICAL ADVICE (OUTPATIENT)
Dept: PSYCHOLOGY | Facility: CLINIC | Age: 40
End: 2020-01-21

## 2020-02-21 ENCOUNTER — OFFICE VISIT (OUTPATIENT)
Dept: PSYCHOLOGY | Facility: CLINIC | Age: 40
End: 2020-02-21
Payer: COMMERCIAL

## 2020-02-21 DIAGNOSIS — F41.9 ANXIETY: Primary | ICD-10-CM

## 2020-02-21 PROCEDURE — 90834 PSYTX W PT 45 MINUTES: CPT | Performed by: COUNSELOR

## 2020-02-21 ASSESSMENT — ANXIETY QUESTIONNAIRES
6. BECOMING EASILY ANNOYED OR IRRITABLE: SEVERAL DAYS
2. NOT BEING ABLE TO STOP OR CONTROL WORRYING: NOT AT ALL
IF YOU CHECKED OFF ANY PROBLEMS ON THIS QUESTIONNAIRE, HOW DIFFICULT HAVE THESE PROBLEMS MADE IT FOR YOU TO DO YOUR WORK, TAKE CARE OF THINGS AT HOME, OR GET ALONG WITH OTHER PEOPLE: SOMEWHAT DIFFICULT
1. FEELING NERVOUS, ANXIOUS, OR ON EDGE: SEVERAL DAYS
5. BEING SO RESTLESS THAT IT IS HARD TO SIT STILL: SEVERAL DAYS
GAD7 TOTAL SCORE: 6
7. FEELING AFRAID AS IF SOMETHING AWFUL MIGHT HAPPEN: NOT AT ALL
3. WORRYING TOO MUCH ABOUT DIFFERENT THINGS: SEVERAL DAYS

## 2020-02-21 ASSESSMENT — PATIENT HEALTH QUESTIONNAIRE - PHQ9: 5. POOR APPETITE OR OVEREATING: MORE THAN HALF THE DAYS

## 2020-02-21 NOTE — PROGRESS NOTES
Progress Note    Client Name: Belle Velez  Date: 2/21/2020         Service Type: Individual   Video Visit: No     Session Start Time: 10:05a  Session End Time: 10:45a      Session Length: 40 minutes     Session #: 21     Attendees: Client attended alone    Treatment Plan Last Reviewed: 2/21/2020  PHQ-9 / CHRISTIANNE-7: 3 & 6       DATA  Interactive Complexity: No  Crisis: No       Progress Since Last Session (Related to Symptoms / Goals / Homework):   Symptoms: Stable, no change, see Epic for CHRISTIANNE 7 updates    Homework: Partially completed - work to address marital concerns (reported she would like to support  to focus on getting his own therapist, reported the past month has been better with positive shared experiences).      Episode of Care Goals: Satisfactory progress - ACTION (Actively working towards change); Intervened by reinforcing change plan / affirming steps taken     Current / Ongoing Stressors and Concerns:   Processing work life balance, missing opportunities at work and thinking about focusing on family until son is in school, lack of accountability from , ongoing practice to say no at work, working to negotiate how she can transfer energy at work to personal life.     Treatment Objective(s) Addressed in This Session:    Client will learn 3 distress tolerance skills and 3 limit setting skills at work for work life balance.  Client will practice effective communication with  weekly.     Intervention:   Motivational Interviewing: open-ended questions, affirmations, reflections and emphasizing personal control and choices, challenge sustain talk, evoke change talk, explore ambivalence, gauge confidence for change   DBT: teach interpersonal effective skills and dialectical thinking/all or nothing thinking, teach validation skills, reinforce noticing skills, reinforce self care, teach differentiating facts from feelings, reinforce mindfulness  skills (effectiveness), teach distress tolerance skills  CBT: identify patterns of anxious thinking and behavior in work and interpersonal relationships, reinforce proactive problem solving skills, teach effective communication skills, teach about accountability and assertiveness, teach about internal locus of control, teach assertiveness skills for conflict resolution, identify and acknowledge process of grief, challenge and replace patterns of behavior at work and in personal life (e.g., decision making, choosing to do work and its impact on personal life)           ASSESSMENT: Current Emotional / Mental Status (status of significant symptoms):   Risk status (Self / Other harm or suicidal ideation)   Client denies current fears or concerns for personal safety.   Client denies current or recent suicidal ideation or behaviors.   Client denies current or recent homicidal ideation or behaviors.   Client denies current or recent self injurious behavior or ideation.   Client denies other safety concerns.   Client Client reports there has been no change in risk factors since their last session.     Client Client reports there has been no change in protective factors since their last session.     A safety and risk management plan has not been developed at this time, however client was given the after-hours number / 911 should there be a change in any of these risk factors.   Client reports there has been no change in risk factors since their last session.     Client reports there has been no change in protective factors since their last session.       Appearance:   Appropriate    Eye Contact:   Fair   Psychomotor Behavior: Normal    Attitude:   Cooperative    Orientation:   All   Speech    Rate / Production: Normal     Volume:  Normal    Mood:    Some anxiousness   Affect:    Appropriate Worrisome Sad   Thought Content:  Clear    Thought Form:  Coherent  Goal Directed  Logical    Insight:    Good      Medication  Review:   No current psychiatric medications prescribed     Medication Compliance:   NA     Changes in Health Issues:   None reported     Chemical Use Review:   Substance Use: Chemical use reviewed, no active concerns identified      Tobacco Use: No current tobacco use      Collateral Reports Completed:   Not Applicable     Diagnosis:   Generalized Anxiety Disorder      PLAN: (Client Tasks / Therapist Tasks / Other)  Therapist will assign homework of awareness development; provide educational materials on distress tolerance skills; teach how to choose an intensity for asking for help or saying  no . Continue to reinforce boundary setting and interpersonal effectiveness. Continue to reinforce internal locus on control. Teach assertiveness and communication skills for work.    By next appt, client will continue to brainstorm how to make time/committ to work life balance and continue to set boundaries at work.        Aury Rodgers [facility]                                                         ________________________________________________________________________    Treatment Plan    Client's Name: Belle Velez  YOB: 1980    Date: 2/21/2020    Diagnoses: UPDATE Unspecified Anxiety Disorder; RULE .02 (F41.1) Generalized Anxiety Disorder  Psychosocial & Contextual Factors:  struggles with grief and other MH issues, work stress, close family and friends are out of state  WHODAS: 17    Referral / Collaboration:  Referral to another professional/service is not indicated at this time.    Anticipated number of session or this episode of care: 12      MeasurableTreatment Goal(s) related to diagnosis / functional impairment(s)  Goal 1: Client will better manage anxiety as evidenced by decreased score on CHRISTIANNE 7 from 12 (moderate) to 5 or less (mild).    I will know I've met my goal when I can maintain work satisfaction and have improved communication with my  and his family.      Objective  #A (Client Action)    Client will practice effective communication with  weekly.  Status: Continued - Date: 2/21/2020    Intervention(s)  Therapist will assign homework of skill practice; provide educational materials on interpersonal effectiveness; role-play conflict management and communication skills; teach the client how to perform a behavioral chain analysis.    Objective #B  Client will learn 3 distress tolerance skills and 3 limit setting skills at work for work life balance.   Status: Continued - Date: 2/21/2020    Intervention(s)  Therapist will assign homework of awareness development; provide educational materials on distress tolerance skills; teach how to choose an intensity for asking for help or saying  no .      Client has reviewed and agreed to the above plan.      KLAUS Abrams  2/21/2020

## 2020-02-22 ASSESSMENT — ANXIETY QUESTIONNAIRES: GAD7 TOTAL SCORE: 6

## 2020-03-06 ENCOUNTER — OFFICE VISIT (OUTPATIENT)
Dept: PSYCHOLOGY | Facility: CLINIC | Age: 40
End: 2020-03-06
Payer: COMMERCIAL

## 2020-03-06 DIAGNOSIS — F41.9 ANXIETY: Primary | ICD-10-CM

## 2020-03-06 PROCEDURE — 90834 PSYTX W PT 45 MINUTES: CPT | Performed by: COUNSELOR

## 2020-03-06 ASSESSMENT — ANXIETY QUESTIONNAIRES
GAD7 TOTAL SCORE: 7
3. WORRYING TOO MUCH ABOUT DIFFERENT THINGS: SEVERAL DAYS
7. FEELING AFRAID AS IF SOMETHING AWFUL MIGHT HAPPEN: NOT AT ALL
1. FEELING NERVOUS, ANXIOUS, OR ON EDGE: NEARLY EVERY DAY
5. BEING SO RESTLESS THAT IT IS HARD TO SIT STILL: NOT AT ALL
IF YOU CHECKED OFF ANY PROBLEMS ON THIS QUESTIONNAIRE, HOW DIFFICULT HAVE THESE PROBLEMS MADE IT FOR YOU TO DO YOUR WORK, TAKE CARE OF THINGS AT HOME, OR GET ALONG WITH OTHER PEOPLE: SOMEWHAT DIFFICULT
6. BECOMING EASILY ANNOYED OR IRRITABLE: NOT AT ALL
2. NOT BEING ABLE TO STOP OR CONTROL WORRYING: SEVERAL DAYS

## 2020-03-06 ASSESSMENT — PATIENT HEALTH QUESTIONNAIRE - PHQ9: 5. POOR APPETITE OR OVEREATING: MORE THAN HALF THE DAYS

## 2020-03-06 NOTE — PROGRESS NOTES
Progress Note    Client Name: Belle Velez  Date: 3/6/2020         Service Type: Individual   Video Visit: No     Session Start Time: 10:05a  Session End Time: 10:45a      Session Length: 40 minutes     Session #: 22     Attendees: Client attended alone    Treatment Plan Last Reviewed: 2/21/2020  PHQ-9 / CHRISTIANNE-7: 3 & 7       DATA  Interactive Complexity: No  Crisis: No       Progress Since Last Session (Related to Symptoms / Goals / Homework):   Symptoms: Stable, no change, see Epic for CHRISTIANNE 7 updates    Homework: Partially completed - continue to brainstorm how to make time/committ to work life balance and continue to set boundaries at work.      Episode of Care Goals: Satisfactory progress - ACTION (Actively working towards change); Intervened by reinforcing change plan / affirming steps taken     Current / Ongoing Stressors and Concerns:   Ongoing processing re: work life balance and satisfaction in current position; reported changing her mind and making the decision to apply for new position at work; acknowledged she would need to negotiate personal time with work and rely more on support system; identified red flag in new job to be if she is stressing her support system due to consistent absence from home.      Treatment Objective(s) Addressed in This Session:    Client will learn 3 distress tolerance skills and 3 limit setting skills at work for work life balance.  Client will practice effective communication with  weekly.     Intervention:   Motivational Interviewing: open-ended questions, affirmations, reflections and emphasizing personal control and choices, challenge sustain talk, evoke change talk, explore ambivalence, gauge confidence for change   DBT: teach interpersonal effective skills and dialectical thinking/all or nothing thinking, teach validation skills, reinforce noticing skills, reinforce self care, teach differentiating facts from  feelings, reinforce mindfulness skills (effectiveness), teach distress tolerance skills  CBT: identify patterns of anxious thinking and behavior in work and interpersonal relationships, reinforce proactive problem solving skills, teach effective communication skills, teach about accountability and assertiveness, teach about internal locus of control, teach assertiveness skills for conflict resolution, identify and acknowledge process of grief, challenge and replace patterns of behavior at work and in personal life (e.g., decision making, choosing to do work and its impact on personal life)           ASSESSMENT: Current Emotional / Mental Status (status of significant symptoms):   Risk status (Self / Other harm or suicidal ideation)   Client denies current fears or concerns for personal safety.   Client denies current or recent suicidal ideation or behaviors.   Client denies current or recent homicidal ideation or behaviors.   Client denies current or recent self injurious behavior or ideation.   Client denies other safety concerns.   Client Client reports there has been no change in risk factors since their last session.     Client Client reports there has been no change in protective factors since their last session.     A safety and risk management plan has not been developed at this time, however client was given the after-hours number / 911 should there be a change in any of these risk factors.   Client reports there has been no change in risk factors since their last session.     Client reports there has been no change in protective factors since their last session.       Appearance:   Appropriate    Eye Contact:   Fair   Psychomotor Behavior: Normal    Attitude:   Cooperative    Orientation:   All   Speech    Rate / Production: Normal     Volume:  Normal    Mood:    Anxious   Affect:    Appropriate Bright at times   Thought Content:  Clear    Thought Form:  Coherent  Goal Directed  Logical    Insight:    Good       Medication Review:   No current psychiatric medications prescribed     Medication Compliance:   NA     Changes in Health Issues:   None reported     Chemical Use Review:   Substance Use: Chemical use reviewed, no active concerns identified      Tobacco Use: No current tobacco use      Collateral Reports Completed:   Not Applicable     Diagnosis:   Generalized Anxiety Disorder      PLAN: (Client Tasks / Therapist Tasks / Other)  Therapist will assign homework of awareness development; provide educational materials on distress tolerance skills; teach how to choose an intensity for asking for help or saying  no . Continue to reinforce boundary setting and interpersonal effectiveness. Continue to reinforce internal locus on control. Teach assertiveness and communication skills for work.    By next appt, client will continue to process decision making re: applying for new position at work.        Aury Rodgers [facility]                                                         ________________________________________________________________________    Treatment Plan    Client's Name: Belle Velez  YOB: 1980    Date: 2/21/2020    Diagnoses: UPDATE Unspecified Anxiety Disorder; RULE .02 (F41.1) Generalized Anxiety Disorder  Psychosocial & Contextual Factors:  struggles with grief and other MH issues, work stress, close family and friends are out of state  WHODAS: 17    Referral / Collaboration:  Referral to another professional/service is not indicated at this time.    Anticipated number of session or this episode of care: 12      MeasurableTreatment Goal(s) related to diagnosis / functional impairment(s)  Goal 1: Client will better manage anxiety as evidenced by decreased score on CHRISTIANNE 7 from 12 (moderate) to 5 or less (mild).    I will know I've met my goal when I can maintain work satisfaction and have improved communication with my  and his family.      Objective #A (Client  Action)    Client will practice effective communication with  weekly.  Status: Continued - Date: 2/21/2020    Intervention(s)  Therapist will assign homework of skill practice; provide educational materials on interpersonal effectiveness; role-play conflict management and communication skills; teach the client how to perform a behavioral chain analysis.    Objective #B  Client will learn 3 distress tolerance skills and 3 limit setting skills at work for work life balance.   Status: Continued - Date: 2/21/2020    Intervention(s)  Therapist will assign homework of awareness development; provide educational materials on distress tolerance skills; teach how to choose an intensity for asking for help or saying  no .      Client has reviewed and agreed to the above plan.      KLAUS Abrams  2/21/2020

## 2020-03-07 ASSESSMENT — ANXIETY QUESTIONNAIRES: GAD7 TOTAL SCORE: 7

## 2020-03-10 ENCOUNTER — HEALTH MAINTENANCE LETTER (OUTPATIENT)
Age: 40
End: 2020-03-10

## 2020-04-02 ENCOUNTER — MYC MEDICAL ADVICE (OUTPATIENT)
Dept: PSYCHOLOGY | Facility: CLINIC | Age: 40
End: 2020-04-02

## 2020-04-03 ENCOUNTER — VIRTUAL VISIT (OUTPATIENT)
Dept: PSYCHOLOGY | Facility: CLINIC | Age: 40
End: 2020-04-03
Payer: COMMERCIAL

## 2020-04-03 DIAGNOSIS — F41.9 ANXIETY: Primary | ICD-10-CM

## 2020-04-03 PROCEDURE — 90834 PSYTX W PT 45 MINUTES: CPT | Mod: 95 | Performed by: COUNSELOR

## 2020-04-03 NOTE — PROGRESS NOTES
"                                           Telephone Progress Note    Client Name: Belle Velez  Date: 4/3/2020         Service Type: Individual   Video Visit: No     Session Start Time: 11:00a  Session End Time: 11:40a      Session Length: 40 minutes     Session #: 23     Attendees: Client attended alone    Telemedicine Visit: The patient's condition can be safely assessed and treated via synchronous audio and visual telemedicine encounter.      Reason for Telemedicine Visit: Services only offered telehealth    Originating Site (Patient Location): Patient's home    Distant Site (Provider Location): Provider Remote Setting    Consent:  The patient/guardian has verbally consented to: the potential risks and benefits of telemedicine (video visit) versus in person care; bill my insurance or make self-payment for services provided; and responsibility for payment of non-covered services.     The patient has been notified of the following:       \"We have found that certain health care needs can be provided without the need for a face to face visit. This service lets us provide the care you need with a phone conversation.       I will have full access to your [facility] medical record during this entire phone call. I will be taking notes for your medical record.      Since this is like an office visit, we will bill your insurance company for this service.       There are potential benefits and risks of telephone visits (e.g. limits to patient confidentiality) that differ from in-person visits.?Confidentiality still applies for telephone services, and nobody will record the visit.  It is important to be in a quiet, private space that is free of distractions (including cell phone or other devices) during the visit.??      If during the course of the call I believe a telephone visit is not appropriate, you will not be charged for this service.\"     Consent has been obtained for this service by care team member: Yes " "      Treatment Plan Last Reviewed: 2/21/2020  PHQ-9 / CHRISTIANNE-7: 3 & 7       DATA  Interactive Complexity: No  Crisis: No       Progress Since Last Session (Related to Symptoms / Goals / Homework):   Symptoms: Stable, no change, see Epic for CHRISTIANNE 7 updates    Homework: Completed - continue to process decision making re: applying for new position at work.      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     Treatment Objective(s) Addressed in This Session:    Client will learn 3 distress tolerance skills and 3 limit setting skills at work for work life balance.  Client will practice effective communication with  weekly.     Intervention:   Motivational Interviewing: open-ended questions, affirmations, reflections and emphasizing personal control and choices, challenge sustain talk, evoke change talk, explore ambivalence, gauge confidence for change   DBT: teach interpersonal effective skills and dialectical thinking/all or nothing thinking, teach validation skills, reinforce noticing skills, reinforce self care, teach " differentiating facts from feelings, reinforce mindfulness skills (effectiveness), teach distress tolerance skills  CBT: identify patterns of anxious thinking and behavior in work and interpersonal relationships, reinforce proactive problem solving skills, teach effective communication skills, teach about accountability and assertiveness, teach about internal locus of control, teach assertiveness skills for conflict resolution, identify and acknowledge process of grief, challenge and replace patterns of behavior at work and in personal life (e.g., decision making, choosing to do work and its impact on personal life)           ASSESSMENT: Current Emotional / Mental Status (status of significant symptoms):   Risk status (Self / Other harm or suicidal ideation)   Client denies current fears or concerns for personal safety.   Client denies current or recent suicidal ideation or behaviors.   Client denies current or recent homicidal ideation or behaviors.   Client denies current or recent self injurious behavior or ideation.   Client denies other safety concerns.   Client Client reports there has been no change in risk factors since their last session.     Client Client reports there has been no change in protective factors since their last session.     A safety and risk management plan has not been developed at this time, however client was given the after-hours number / 911 should there be a change in any of these risk factors.   Client reports there has been no change in risk factors since their last session.     Client reports there has been no change in protective factors since their last session.       Appearance:   n/a   Eye Contact:   n/a   Psychomotor Behavior: n/a    Attitude:   Cooperative    Orientation:   All   Speech    Rate / Production: Normal     Volume:  Normal    Mood:    Anxious   Affect:    Appropriate    Thought Content:  Clear    Thought Form:  Coherent  Goal Directed  Logical    Insight:    Good       Medication Review:   No current psychiatric medications prescribed     Medication Compliance:   NA     Changes in Health Issues:   None reported     Chemical Use Review:   Substance Use: Chemical use reviewed, no active concerns identified      Tobacco Use: No current tobacco use      Collateral Reports Completed:   Not Applicable     Diagnosis:   Generalized Anxiety Disorder      PLAN: (Client Tasks / Therapist Tasks / Other)  Therapist will assign homework of awareness development; provide educational materials on distress tolerance skills; teach how to choose an intensity for asking for help or saying  no . Continue to reinforce boundary setting and interpersonal effectiveness. Continue to reinforce internal locus on control. Teach assertiveness and communication skills for work.    By next appt, client will wok to reinforce healthy habits, take advantage of having more time, and compartmentalize/contain concerns for others, kerry re: mother's work.        Aury Rodgers [facility]                                                         ________________________________________________________________________    Treatment Plan    Client's Name: Belle Velez  YOB: 1980    Date: 2/21/2020    Diagnoses: UPDATE Unspecified Anxiety Disorder; RULE .02 (F41.1) Generalized Anxiety Disorder  Psychosocial & Contextual Factors:  struggles with grief and other MH issues, work stress, close family and friends are out of state  WHODAS: 17    Referral / Collaboration:  Referral to another professional/service is not indicated at this time.    Anticipated number of session or this episode of care: 12      MeasurableTreatment Goal(s) related to diagnosis / functional impairment(s)  Goal 1: Client will better manage anxiety as evidenced by decreased score on CHRISTIANNE 7 from 12 (moderate) to 5 or less (mild).    I will know I've met my goal when I can maintain work satisfaction and have improved  communication with my  and his family.      Objective #A (Client Action)    Client will practice effective communication with  weekly.  Status: Continued - Date: 2/21/2020    Intervention(s)  Therapist will assign homework of skill practice; provide educational materials on interpersonal effectiveness; role-play conflict management and communication skills; teach the client how to perform a behavioral chain analysis.    Objective #B  Client will learn 3 distress tolerance skills and 3 limit setting skills at work for work life balance.   Status: Continued - Date: 2/21/2020    Intervention(s)  Therapist will assign homework of awareness development; provide educational materials on distress tolerance skills; teach how to choose an intensity for asking for help or saying  no .      Client has reviewed and agreed to the above plan.      KLAUS Abrams  2/21/2020

## 2020-04-08 ENCOUNTER — COMMUNICATION - HEALTHEAST (OUTPATIENT)
Dept: BEHAVIORAL HEALTH | Facility: CLINIC | Age: 40
End: 2020-04-08

## 2020-04-28 ENCOUNTER — COMMUNICATION - HEALTHEAST (OUTPATIENT)
Dept: BEHAVIORAL HEALTH | Facility: CLINIC | Age: 40
End: 2020-04-28

## 2020-05-18 ENCOUNTER — OFFICE VISIT - HEALTHEAST (OUTPATIENT)
Dept: BEHAVIORAL HEALTH | Facility: CLINIC | Age: 40
End: 2020-05-18

## 2020-05-18 DIAGNOSIS — F41.1 GENERALIZED ANXIETY DISORDER: ICD-10-CM

## 2020-06-19 ENCOUNTER — OFFICE VISIT - HEALTHEAST (OUTPATIENT)
Dept: BEHAVIORAL HEALTH | Facility: CLINIC | Age: 40
End: 2020-06-19

## 2020-06-19 DIAGNOSIS — F41.9 ANXIETY DISORDER, UNSPECIFIED TYPE: ICD-10-CM

## 2020-07-08 ENCOUNTER — OFFICE VISIT - HEALTHEAST (OUTPATIENT)
Dept: BEHAVIORAL HEALTH | Facility: CLINIC | Age: 40
End: 2020-07-08

## 2020-07-08 DIAGNOSIS — F41.9 ANXIETY DISORDER, UNSPECIFIED TYPE: ICD-10-CM

## 2020-07-13 ENCOUNTER — VIRTUAL VISIT (OUTPATIENT)
Dept: FAMILY MEDICINE | Facility: OTHER | Age: 40
End: 2020-07-13
Payer: COMMERCIAL

## 2020-07-13 PROCEDURE — 99421 OL DIG E/M SVC 5-10 MIN: CPT | Performed by: PHYSICIAN ASSISTANT

## 2020-07-13 NOTE — PROGRESS NOTES
"Date: 2020 10:39:38  Clinician: Javier Vaz  Clinician NPI: [id number]  Patient: Belle Velez  Patient : 1980  Patient Address: [address] [place] [id number]  Patient Phone: [phone]  Visit Protocol: URI  Patient Summary:  Belle is a 39 year old ( : 1980 ) female who initiated a Visit for COVID-19 (Coronavirus) evaluation and screening. When asked the question \"Please sign me up to receive news, health information and promotions from [facility] [employer].\", Belle responded \"No\".    Belle states her symptoms started gradually 3-4 days ago. After her symptoms started, they improved and then got worse again.   Her symptoms consist of rhinitis, malaise, a headache, and a sore throat.   Symptom details     Nasal secretions: The color of her mucus is clear.    Sore throat: Belle reports having mild throat pain (1-3 on a 10 point pain scale), does not have exudate on her tonsils, and can swallow liquids. She is not sure if the lymph nodes in her neck are enlarged. A rash has not appeared on the skin since the sore throat started.     Headache: She states the headache is moderate (4-6 on a 10 point pain scale).      Belle denies having wheezing, nausea, teeth pain, ageusia, diarrhea, vomiting, ear pain, chills, myalgias, anosmia, facial pain or pressure, fever, cough, and nasal congestion. She also denies having recent facial or sinus surgery in the past 60 days, taking antibiotic medication in the past month, and having a sinus infection within the past year. She is not experiencing dyspnea.   Precipitating events  Belle is not sure if she has been exposed to someone with strep throat.   Pertinent COVID-19 (Coronavirus) information  In the past 14 days, Belle has not worked in a congregate living setting.   She does not work or volunteer as healthcare worker or a  and does not work or volunteer in a healthcare facility.   Belle also has not lived in a congregate living " setting in the past 14 days. She does not live with a healthcare worker.   Belle has not had a close contact with a laboratory-confirmed COVID-19 patient within 14 days of symptom onset.   Pertinent medical history  Belle does not get yeast infections when she takes antibiotics.   Belle does not need a return to work/school note.   Weight: 170 lbs   Belle does not smoke or use smokeless tobacco.   She denies pregnancy and denies breastfeeding. She has menstruated in the past month.   Weight: 170 lbs    MEDICATIONS: ibuprofen oral, ALLERGIES: NKDA  Clinician Response:  Dear Belle,   Your symptoms show that you may have coronavirus (COVID-19). This illness can cause fever, cough and trouble breathing. Many people get a mild case and get better on their own. Some people can get very sick.  What should I do?  We would like to test you for this virus.   1. Please call [phone] to schedule your visit. Explain that you were referred by [facility] to have a COVID-19 test. Be ready to share your [facility] visit ID number.  The following will serve as your written order for this COVID Test, ordered by me, for the indication of suspected COVID [Z20.828]: The test will be ordered in [employer], our electronic health record, after you are scheduled. It will show as ordered and authorized by Felipe Millan MD.  Order: COVID-19 (Coronavirus) PCR for SYMPTOMATIC testing from [facility].      2. When it's time for your COVID test:  Stay at least 6 feet away from others. (If someone will drive you to your test, stay in the backseat, as far away from the  as you can.)   Cover your mouth and nose with a mask, tissue or washcloth.  Go straight to the testing site. Don't make any stops on the way there or back.      3.Starting now: Stay home and away from others (self-isolate) until:   You've had no fever---and no medicine that reduces fever---for 3 full days (72 hours). And...   Your other symptoms have gotten better. For example, your cough  "or breathing has improved. And...   At least 10 days have passed since your symptoms started.       During this time, don't leave the house except for testing or medical care.   Stay in your own room, even for meals. Use your own bathroom if you can.   Stay away from others in your home. No hugging, kissing or shaking hands. No visitors.  Don't go to work, school or anywhere else.    Clean \"high touch\" surfaces often (doorknobs, counters, handles, etc.). Use a household cleaning spray or wipes. You'll find a full list of  on the EPA website: www.epa.gov/pesticide-registration/list-n-disinfectants-use-against-sars-cov-2.   Cover your mouth and nose with a mask, tissue or washcloth to avoid spreading germs.  Wash your hands and face often. Use soap and water.  Caregivers in these groups are at risk for severe illness due to COVID-19:  o People 65 years and older  o People who live in a nursing home or long-term care facility  o People with chronic disease (lung, heart, cancer, diabetes, kidney, liver, immunologic)  o People who have a weakened immune system, including those who:   Are in cancer treatment  Take medicine that weakens the immune system, such as corticosteroids  Had a bone marrow or organ transplant  Have an immune deficiency  Have poorly controlled HIV or AIDS  Are obese (body mass index of 40 or higher)  Smoke regularly   o Caregivers should wear gloves while washing dishes, handling laundry and cleaning bedrooms and bathrooms.  o Use caution when washing and drying laundry: Don't shake dirty laundry, and use the warmest water setting that you can.  o For more tips, go to www.cdc.gov/coronavirus/2019-ncov/downloads/10Things.pdf.    4.Sign up for [employer]. We know it's scary to hear that you might have COVID-19. We want to track your symptoms to make sure you're okay over the next 2 weeks. Please look for an email from [employer]---this is a free, online program that we'll use to keep in " touch. To sign up, follow the link in the email. Learn more at [email or web address]  How can I take care of myself?   Get lots of rest. Drink extra fluids (unless a doctor has told you not to).   Take Tylenol (acetaminophen) for fever or pain. If you have liver or kidney problems, ask your family doctor if it's okay to take Tylenol.   Adults can take either:    650 mg (two 325 mg pills) every 4 to 6 hours, or...   1,000 mg (two 500 mg pills) every 8 hours as needed.    Note: Don't take more than 3,000 mg in one day. Acetaminophen is found in many medicines (both prescribed and over-the-counter medicines). Read all labels to be sure you don't take too much.   For children, check the Tylenol bottle for the right dose. The dose is based on the child's age or weight.    If you have other health problems (like cancer, heart failure, an organ transplant or severe kidney disease): Call your specialty clinic if you don't feel better in the next 2 days.       Know when to call 911. Emergency warning signs include:    Trouble breathing or shortness of breath Pain or pressure in the chest that doesn't go away Feeling confused like you haven't felt before, or not being able to wake up Bluish-colored lips or face.  Where can I get more information?   [facility] -- About COVID-19: [email or web address]   CDC -- What to Do If You're Sick: www.cdc.gov/coronavirus/2019-ncov/about/steps-when-sick.html   CDC -- Ending Home Isolation: www.cdc.gov/coronavirus/2019-ncov/hcp/disposition-in-home-patients.html   CDC -- Caring for Someone: www.cdc.gov/coronavirus/2019-ncov/if-you-are-sick/care-for-someone.html   [facility] -- Interim Guidance for Hospital Discharge to Home: www.health.[facility].mn.us/diseases/coronavirus/hcp/hospdischarge.pdf   [facility] clinical trials (COVID-19 research studies): clinicalaffairs.[facility]/umn-clinical-trials    Below are the COVID-19 hotlines at the [facility] of  Health ([facility]). Interpreters are available.    For health questions: Call 534-195-8297 or 1-478.645.4617 (7 a.m. to 7 p.m.) For questions about schools and childcare: Call 721-822-5844 or 1-502.869.7390 (7 a.m. to 7 p.m.)    Diagnosis: Pain in throat  Diagnosis ICD: R07.0

## 2020-07-15 DIAGNOSIS — Z20.822 SUSPECTED COVID-19 VIRUS INFECTION: Primary | ICD-10-CM

## 2020-07-15 PROCEDURE — U0003 INFECTIOUS AGENT DETECTION BY NUCLEIC ACID (DNA OR RNA); SEVERE ACUTE RESPIRATORY SYNDROME CORONAVIRUS 2 (SARS-COV-2) (CORONAVIRUS DISEASE [COVID-19]), AMPLIFIED PROBE TECHNIQUE, MAKING USE OF HIGH THROUGHPUT TECHNOLOGIES AS DESCRIBED BY CMS-2020-01-R: HCPCS | Performed by: FAMILY MEDICINE

## 2020-07-15 NOTE — LETTER
July 20, 2020        Belle Velez  [address]  [place] [address]    This letter provides a written record that you were tested for COVID-19 on 7/15/20.       Your result was negative. This means that we didn t find the virus that causes COVID-19 in your sample. A test may show negative when you do actually have the virus. This can happen when the virus is in the early stages of infection, before you feel illness symptoms.    If you have symptoms   Stay home and away from others (self-isolate) until you meet ALL of the guidelines below:    You ve had no fever--and no medicine that reduces fever--for 3 full days (72 hours). And      Your other symptoms have gotten better. For example, your cough or breathing has improved. And     At least 10 days have passed since your symptoms started.    During this time:    Stay home. Don t go to work, school or anywhere else.     Stay in your own room, including for meals. Use your own bathroom if you can.    Stay away from others in your home. No hugging, kissing or shaking hands. No visitors.    Clean  high touch  surfaces often (doorknobs, counters, handles, etc.). Use a household cleaning spray or wipes. You can find a full list on the EPA website at www.epa.gov/pesticide-registration/list-n-disinfectants-use-against-sars-cov-2.    Cover your mouth and nose with a mask, tissue or washcloth to avoid spreading germs.    Wash your hands and face often with soap and water.    Going back to work  Check with your employer for any guidelines to follow for going back to work.    Employers: This document serves as formal notice that your employee tested negative for COVID-19, as of the testing date shown above.

## 2020-07-16 LAB
SARS-COV-2 RNA SPEC QL NAA+PROBE: NOT DETECTED
SPECIMEN SOURCE: NORMAL

## 2020-09-21 ASSESSMENT — ANXIETY QUESTIONNAIRES
7. FEELING AFRAID AS IF SOMETHING AWFUL MIGHT HAPPEN: NOT AT ALL
3. WORRYING TOO MUCH ABOUT DIFFERENT THINGS: SEVERAL DAYS
GAD7 TOTAL SCORE: 3
7. FEELING AFRAID AS IF SOMETHING AWFUL MIGHT HAPPEN: NOT AT ALL
1. FEELING NERVOUS, ANXIOUS, OR ON EDGE: NOT AT ALL
GAD7 TOTAL SCORE: 3
6. BECOMING EASILY ANNOYED OR IRRITABLE: SEVERAL DAYS
5. BEING SO RESTLESS THAT IT IS HARD TO SIT STILL: NOT AT ALL
4. TROUBLE RELAXING: SEVERAL DAYS
2. NOT BEING ABLE TO STOP OR CONTROL WORRYING: NOT AT ALL

## 2020-09-21 ASSESSMENT — ENCOUNTER SYMPTOMS
DEPRESSION: 0
DECREASED CONCENTRATION: 0
INSOMNIA: 1
NERVOUS/ANXIOUS: 1
PANIC: 0

## 2020-09-22 ENCOUNTER — OFFICE VISIT (OUTPATIENT)
Dept: OBGYN | Facility: CLINIC | Age: 40
End: 2020-09-22
Attending: ADVANCED PRACTICE MIDWIFE
Payer: COMMERCIAL

## 2020-09-22 VITALS
DIASTOLIC BLOOD PRESSURE: 75 MMHG | HEIGHT: 69 IN | BODY MASS INDEX: 24.99 KG/M2 | HEART RATE: 87 BPM | WEIGHT: 168.7 LBS | SYSTOLIC BLOOD PRESSURE: 126 MMHG

## 2020-09-22 DIAGNOSIS — Z01.419 ENCOUNTER FOR GYNECOLOGICAL EXAMINATION WITHOUT ABNORMAL FINDING: Primary | ICD-10-CM

## 2020-09-22 DIAGNOSIS — Z01.812 PRE-PROCEDURE LAB EXAM: ICD-10-CM

## 2020-09-22 DIAGNOSIS — Z30.430 ENCOUNTER FOR INSERTION OF INTRAUTERINE CONTRACEPTIVE DEVICE: ICD-10-CM

## 2020-09-22 PROBLEM — N94.9 PERINEAL DISCOMFORT IN FEMALE: Status: RESOLVED | Noted: 2017-03-24 | Resolved: 2020-09-22

## 2020-09-22 LAB
HCG UR QL: NEGATIVE
INTERNAL QC OK POCT: YES

## 2020-09-22 PROCEDURE — 58300 INSERT INTRAUTERINE DEVICE: CPT | Mod: ZF | Performed by: ADVANCED PRACTICE MIDWIFE

## 2020-09-22 PROCEDURE — 25000128 H RX IP 250 OP 636: Mod: ZF | Performed by: ADVANCED PRACTICE MIDWIFE

## 2020-09-22 PROCEDURE — 25000128 H RX IP 250 OP 636: Mod: ZF

## 2020-09-22 PROCEDURE — 90471 IMMUNIZATION ADMIN: CPT | Mod: ZF

## 2020-09-22 PROCEDURE — 81025 URINE PREGNANCY TEST: CPT | Mod: ZF | Performed by: ADVANCED PRACTICE MIDWIFE

## 2020-09-22 PROCEDURE — 90686 IIV4 VACC NO PRSV 0.5 ML IM: CPT | Mod: ZF

## 2020-09-22 PROCEDURE — G0463 HOSPITAL OUTPT CLINIC VISIT: HCPCS | Mod: ZF

## 2020-09-22 RX ADMIN — LEVONORGESTREL 20 MCG: 52 INTRAUTERINE DEVICE INTRAUTERINE at 14:00

## 2020-09-22 ASSESSMENT — ANXIETY QUESTIONNAIRES
2. NOT BEING ABLE TO STOP OR CONTROL WORRYING: NOT AT ALL
1. FEELING NERVOUS, ANXIOUS, OR ON EDGE: SEVERAL DAYS
3. WORRYING TOO MUCH ABOUT DIFFERENT THINGS: SEVERAL DAYS
GAD7 TOTAL SCORE: 3
6. BECOMING EASILY ANNOYED OR IRRITABLE: SEVERAL DAYS
5. BEING SO RESTLESS THAT IT IS HARD TO SIT STILL: NOT AT ALL
GAD7 TOTAL SCORE: 3
7. FEELING AFRAID AS IF SOMETHING AWFUL MIGHT HAPPEN: NOT AT ALL

## 2020-09-22 ASSESSMENT — PATIENT HEALTH QUESTIONNAIRE - PHQ9: 5. POOR APPETITE OR OVEREATING: NOT AT ALL

## 2020-09-22 ASSESSMENT — MIFFLIN-ST. JEOR: SCORE: 1509.6

## 2020-09-22 NOTE — PROGRESS NOTES
Subjective:  Belle Velez is a 39 year old female who presents today for her annual exam.  HPI:    - Reports intermittent swelling in L lower leg, occasional occurs in R lower leg but always worse in L.  Notes occasional bluish-reddish discoloration near ankle.  Notes some discomfort/tightness and itching with swelling.  Symptoms worsening over the past  5 years. Has used compression stockings in the past that worked well. Also reports varicose veins on upper legs.   - Desires IUD insertion today. Initially desired Paragard d/t length of use.  Not planning more children in the future.  After discussion of side effects, benefits, etc. Pt opted for Mirena IUD.    Menstrual History:  OB History    Para Term  AB Living   1 1 1 0 0 1   SAB TAB Ectopic Multiple Live Births   0 0 0 0 1      # Outcome Date GA Lbr Ren/2nd Weight Sex Delivery Anes PTL Lv   1 Term 16 40w1d 05:05 / 05:51 3.657 kg (8 lb 1 oz) M Vag-Spont Local, EPI N BUNNY      Name: KENY,KARO LOPEZ      Apgar1: 8  Apgar5: 9      Patient's last menstrual period was 2020 (exact date).   Periods are regular q 28-30 days,  Dysmenorrhea mild, occurring premenstrually. Cyclic symptoms include  NONE, irritability/moodiness and cramping/pelvic pain. Additional symptoms include NONE    Social History:  Current contraception: condoms  Number of partners in last year: 1    Social History     Socioeconomic History     Marital status:      Spouse name: Gerry     Number of children: Not on file     Years of education: Not on file     Highest education level: Not on file   Occupational History     Occupation:    Social Needs     Financial resource strain: Not on file     Food insecurity     Worry: Not on file     Inability: Not on file     Transportation needs     Medical: Not on file     Non-medical: Not on file   Tobacco Use     Smoking status: Never Smoker     Smokeless tobacco: Never Used   Substance and Sexual  Activity     Alcohol use: Yes     Comment: up to 4 drinks per week max     Drug use: No     Sexual activity: Yes     Partners: Male     Birth control/protection: Condom   Lifestyle     Physical activity     Days per week: Not on file     Minutes per session: Not on file     Stress: Not on file   Relationships     Social connections     Talks on phone: Not on file     Gets together: Not on file     Attends Jain service: Not on file     Active member of club or organization: Not on file     Attends meetings of clubs or organizations: Not on file     Relationship status: Not on file     Intimate partner violence     Fear of current or ex partner: Not on file     Emotionally abused: Not on file     Physically abused: Not on file     Forced sexual activity: Not on file   Other Topics Concern     Parent/sibling w/ CABG, MI or angioplasty before 65F 55M? Not Asked   Social History Narrative     Not on file     Past Screenings:  Health Maintenance   Topic Date Due     PREVENTIVE CARE VISIT  1980     PAP  05/05/2019     PHQ-2  01/01/2020     INFLUENZA VACCINE (1) 09/01/2020     DTAP/TDAP/TD IMMUNIZATION (2 - Td) 09/08/2026     HIV SCREENING  Completed     IPV IMMUNIZATION  Aged Out     MENINGITIS IMMUNIZATION  Aged Out     HEPATITIS B IMMUNIZATION  Aged Out     Lab Results   Component Value Date    PAP wnl 05/05/2016      History of abnormal Pap smear: No  Last pap 2016 NILM, HPV neg     Immunizations:  Immunization History   Administered Date(s) Administered     Influenza Vaccine IM > 6 months Valent IIV4 10/19/2016, 10/19/2017, 11/09/2018, 11/12/2019, 09/22/2020     TDAP Vaccine (Boostrix) 09/08/2016     History:  No Known Allergies  Family History   Problem Relation Age of Onset     Other Cancer Maternal Grandmother         Gall Bladder     Substance Abuse Maternal Grandfather         Alcoholic     Diabetes Paternal Grandfather         Type II     Past Medical History:   Diagnosis Date     Anxiety      NO  "ACTIVE PROBLEMS      Skin lesion      Past Surgical History:   Procedure Laterality Date     NO HISTORY OF SURGERY       ROS:  RESP: NEGATIVE for significant cough or SOB  BREAST: NEGATIVE for masses, tenderness or discharge  CV: NEGATIVE for chest pain, palpitations   GI: NEGATIVE for nausea, abdominal pain, heartburn, or change in bowel habits  : NEGATIVE for frequency, dysuria, or hematuria  C: NEGATIVE for fever, chills  E: NEGATIVE for vision changes   R: NEGATIVE for significant cough or SOB  M: NEGATIVE for significant arthralgias or myalgia  N: NEGATIVE for weakness, dizziness or paresthesias or headache    Physical Exam:  /75 (BP Location: Left arm, Patient Position: Chair)   Pulse 87   Ht 1.753 m (5' 9\")   Wt 76.5 kg (168 lb 11.2 oz)   LMP 2020 (Exact Date)   Breastfeeding No   BMI 24.91 kg/m    BMI: Body mass index is 24.91 kg/m .  GENERAL APPEARANCE: healthy, alert, no distress and smiling  NECK: no adenopathy, no asymmetry, masses, or scars and thyroid normal to palpation  RESP: lungs clear to auscultation - no rales, rhonchi or wheezes  BREAST: normal without masses, tenderness or nipple discharge, no palpable axillary masses or adenopathy and nipple inversion right  CV: regular rates and rhythm, normal S1 S2, no S3 or S4 and no murmur, click or rub  ABDOMEN: soft, nontender, without hepatosplenomegaly or masses and bowel sounds normal   (female): normal cervix, adnexae, and uterus without masses or discharge    WET PREP:Not done  Gonorrhea and chlamydia screen obtained: NO    IUD Insertion:  CONSULT:    Is a pregnancy test required: Yes.  Was it positive or negative?  Negative  Was a consent obtained?  Yes    Subjective: Belle Velez is a 39 year old  presents for IUD and desires Mirena type IUD.    Patient has been given the opportunity to ask questions about all forms of birth control, including all options appropriate for Belle Velez. Discussed that no " "method of birth control, except abstinence is 100% effective against pregnancy or sexually transmitted infection.     Belle Velez understands she may have the IUD removed at any time. IUD should be removed by a health care provider.    The entire insertion procedure was reviewed with the patient, including care after placement.    Patient's last menstrual period was 09/03/2020 (exact date). Last sexual activity: last week, used condoms. No allergy to betadine or shellfish. Patient declines STD screening  HCG Qual Urine   Date Value Ref Range Status   09/22/2020 Negative neg Final       /75 (BP Location: Left arm, Patient Position: Chair)   Pulse 87   Ht 1.753 m (5' 9\")   Wt 76.5 kg (168 lb 11.2 oz)   LMP 09/03/2020 (Exact Date)   Breastfeeding No   BMI 24.91 kg/m      Pelvic Exam:   EG/BUS: normal genital architecture without lesions, erythema or abnormal secretions.   Vagina: moist, pink, rugae with physiologic discharge and secretions  Cervix: multiparous no lesions and pink, moist, closed, without lesion or CMT   Uterus: Normal shape, position, mobile, no pain    Adnexa: within normal limits and no masses, nodularity, tenderness    PROCEDURE NOTE: -- IUD Insertion    Reason for Insertion: contraception    Pt advised to take ibuprofen for pain as needed.  Under sterile technique, cervix was visualized with speculum and prepped with Betadine solution swab x 3. Tenaculum was placed for stability. The uterus was gently straightened and sounded to 9.0 cm. IUD prepared for placement, and IUD inserted according to 's instructions without difficulty or significant resitance, and deployed at the fundus. The strings were visualized and trimmed to 3.0 cm from the external os. Tenaculum was removed and hemostasis noted. Speculum removed.  Patient tolerated procedure well.    Lot # [id number]  Exp: 11/2022    EBL: minimal    Complications: none.  Pt tearful after IUD insertion, not due to pain but " "triggering for childbirth. Pt declined option to discuss further.      Assessment:  Annual Gyn exam within normal limits  Mirena IUD insertion  Lower extremity edema    Plan:  Orders Placed This Encounter   Procedures     INSERTION INTRAUTERINE DEVICE     FLU VAC PRESRV FREE QUAD SPLIT VIR 3+YRS IM     hCG qual urine POCT     - Discussed appointment with internal medicine to further evaluation lower extremity symptoms.   - Additional teaching done at this visit included: self breast exam and birth control.   - Given 's handouts, including when to have IUD removed, list of danger s/sx, side effects and follow up recommended.  - Back up contraception advised for 7 days if progestin method. - Advised to call for any fever, for prolonged or severe pain or bleeding, abnormal vaginal discharge, or unable to palpate strings. - Encouraged use of pain medications (ibuprofen) as needed for mild to moderate pain.  - Offered follow-up in clinic in 4-6 weeks for IUD string check if desired  - Pap due 2021  -  RTC in one year for annual exam or with concerns.      \"I, Lane Millan, completed the PFSH and ROS. I then acted as a scribe for CNM for the remainder of the visit.\" Lane Millan, SB, DNP Student.    The encounter was performed by me and scribed by the student. The scribed note accurately reflects my personal services and decisions made by me. -  GREGORIO Parkinson CNM    Answers for HPI/ROS submitted by the patient on 9/21/2020   CHRISTIANNE 7 TOTAL SCORE: 3  General Symptoms: No  Skin Symptoms: No  HENT Symptoms: No  EYE SYMPTOMS: No  HEART SYMPTOMS: No  LUNG SYMPTOMS: No  INTESTINAL SYMPTOMS: No  URINARY SYMPTOMS: No  GYNECOLOGIC SYMPTOMS: No  BREAST SYMPTOMS: No  SKELETAL SYMPTOMS: No  BLOOD SYMPTOMS: No  NERVOUS SYSTEM SYMPTOMS: No  MENTAL HEALTH SYMPTOMS: Yes  Nervous or Anxious: Yes  Depression: No  Trouble sleeping: Yes  Trouble thinking or concentrating: No  Mood changes: No  Panic attacks: No    "

## 2020-09-22 NOTE — LETTER
2020       RE: Belle Bustillo  [address]  [facility] [address]     Dear Colleague,    Thank you for referring your patient, Belle Bustillo, to the [facility] at [facility]. Please see a copy of my visit note below.    Subjective:  Belle Bustillo is a 39 year old female who presents today for her annual exam.  HPI:    - Reports intermittent swelling in L lower leg, occasional occurs in R lower leg but always worse in L.  Notes occasional bluish-reddish discoloration near ankle.  Notes some discomfort/tightness and itching with swelling.  Symptoms worsening over the past  5 years. Has used compression stockings in the past that worked well. Also reports varicose veins on upper legs.   - Desires IUD insertion today. Initially desired Paragard d/t length of use.  Not planning more children in the future.  After discussion of side effects, benefits, etc. Pt opted for Mirena IUD.    Menstrual History:  OB History    Para Term  AB Living   1 1 1 0 0 1   SAB TAB Ectopic Multiple Live Births   0 0 0 0 1      # Outcome Date GA Lbr Ren/2nd Weight Sex Delivery Anes PTL Lv   1 Term 16 40w1d 05:05 / 05:51 3.657 kg (8 lb 1 oz) M Vag-Spont Local, EPI N BUNNY      Name: KARO BUSTILLO      Apgar1: 8  Apgar5: 9      Patient's last menstrual period was 2020 (exact date).   Periods are regular q 28-30 days,  Dysmenorrhea mild, occurring premenstrually. Cyclic symptoms include  NONE, irritability/moodiness and cramping/pelvic pain. Additional symptoms include NONE    Social History:  Current contraception: condoms  Number of partners in last year: 1    Social History     Socioeconomic History     Marital status:      Spouse name: Gerry     Number of children: Not on file     Years of education: Not on file     Highest education level: Not on file   Occupational History     Occupation:    Social Needs      Financial resource strain: Not on file     Food insecurity     Worry: Not on file     Inability: Not on file     Transportation needs     Medical: Not on file     Non-medical: Not on file   Tobacco Use     Smoking status: Never Smoker     Smokeless tobacco: Never Used   Substance and Sexual Activity     Alcohol use: Yes     Comment: up to 4 drinks per week max     Drug use: No     Sexual activity: Yes     Partners: Male     Birth control/protection: Condom   Lifestyle     Physical activity     Days per week: Not on file     Minutes per session: Not on file     Stress: Not on file   Relationships     Social connections     Talks on phone: Not on file     Gets together: Not on file     Attends Christian service: Not on file     Active member of club or organization: Not on file     Attends meetings of clubs or organizations: Not on file     Relationship status: Not on file     Intimate partner violence     Fear of current or ex partner: Not on file     Emotionally abused: Not on file     Physically abused: Not on file     Forced sexual activity: Not on file   Other Topics Concern     Parent/sibling w/ CABG, MI or angioplasty before 65F 55M? Not Asked   Social History Narrative     Not on file     Past Screenings:  Health Maintenance   Topic Date Due     PREVENTIVE CARE VISIT  1981     PAP  05/05/2019     PHQ-2  01/01/2020     INFLUENZA VACCINE (1) 09/01/2020     DTAP/TDAP/TD IMMUNIZATION (2 - Td) 09/08/2026     HIV SCREENING  Completed     IPV IMMUNIZATION  Aged Out     MENINGITIS IMMUNIZATION  Aged Out     HEPATITIS B IMMUNIZATION  Aged Out     Lab Results   Component Value Date    PAP wnl 05/05/2016      History of abnormal Pap smear: No  Last pap 2016 NILM, HPV neg     Immunizations:  Immunization History   Administered Date(s) Administered     Influenza Vaccine IM > 6 months Valent IIV4 10/19/2016, 10/19/2017, 11/09/2018, 11/12/2019, 09/22/2020     TDAP Vaccine (Boostrix) 09/08/2016     History:  No  "Known Allergies  Family History   Problem Relation Age of Onset     Other Cancer Maternal Grandmother         Gall Bladder     Substance Abuse Maternal Grandfather         Alcoholic     Diabetes Paternal Grandfather         Type II     Past Medical History:   Diagnosis Date     Anxiety      NO ACTIVE PROBLEMS      Skin lesion      Past Surgical History:   Procedure Laterality Date     NO HISTORY OF SURGERY       ROS:  RESP: NEGATIVE for significant cough or SOB  BREAST: NEGATIVE for masses, tenderness or discharge  CV: NEGATIVE for chest pain, palpitations   GI: NEGATIVE for nausea, abdominal pain, heartburn, or change in bowel habits  : NEGATIVE for frequency, dysuria, or hematuria  C: NEGATIVE for fever, chills  E: NEGATIVE for vision changes   R: NEGATIVE for significant cough or SOB  M: NEGATIVE for significant arthralgias or myalgia  N: NEGATIVE for weakness, dizziness or paresthesias or headache    Physical Exam:  /75 (BP Location: Left arm, Patient Position: Chair)   Pulse 87   Ht 1.753 m (5' 9\")   Wt 76.5 kg (168 lb 11.2 oz)   LMP 09/03/2020 (Exact Date)   Breastfeeding No   BMI 24.91 kg/m    BMI: Body mass index is 24.91 kg/m .  GENERAL APPEARANCE: healthy, alert, no distress and smiling  NECK: no adenopathy, no asymmetry, masses, or scars and thyroid normal to palpation  RESP: lungs clear to auscultation - no rales, rhonchi or wheezes  BREAST: normal without masses, tenderness or nipple discharge, no palpable axillary masses or adenopathy and nipple inversion right  CV: regular rates and rhythm, normal S1 S2, no S3 or S4 and no murmur, click or rub  ABDOMEN: soft, nontender, without hepatosplenomegaly or masses and bowel sounds normal   (female): normal cervix, adnexae, and uterus without masses or discharge    WET PREP:Not done  Gonorrhea and chlamydia screen obtained: NO    IUD Insertion:  CONSULT:    Is a pregnancy test required: Yes.  Was it positive or negative?  Negative  Was a " "consent obtained?  Yes    Subjective: Belle Velez is a 39 year old  presents for IUD and desires Mirena type IUD.    Patient has been given the opportunity to ask questions about all forms of birth control, including all options appropriate for Belle Velez. Discussed that no method of birth control, except abstinence is 100% effective against pregnancy or sexually transmitted infection.     Belle Velez understands she may have the IUD removed at any time. IUD should be removed by a health care provider.    The entire insertion procedure was reviewed with the patient, including care after placement.    Patient's last menstrual period was 2020 (exact date). Last sexual activity: last week, used condoms. No allergy to betadine or shellfish. Patient declines STD screening  HCG Qual Urine   Date Value Ref Range Status   2020 Negative neg Final       /75 (BP Location: Left arm, Patient Position: Chair)   Pulse 87   Ht 1.753 m (5' 9\")   Wt 76.5 kg (168 lb 11.2 oz)   LMP 2020 (Exact Date)   Breastfeeding No   BMI 24.91 kg/m      Pelvic Exam:   EG/BUS: normal genital architecture without lesions, erythema or abnormal secretions.   Vagina: moist, pink, rugae with physiologic discharge and secretions  Cervix: multiparous no lesions and pink, moist, closed, without lesion or CMT   Uterus: Normal shape, position, mobile, no pain    Adnexa: within normal limits and no masses, nodularity, tenderness    PROCEDURE NOTE: -- IUD Insertion    Reason for Insertion: contraception    Pt advised to take ibuprofen for pain as needed.  Under sterile technique, cervix was visualized with speculum and prepped with Betadine solution swab x 3. Tenaculum was placed for stability. The uterus was gently straightened and sounded to 9.0 cm. IUD prepared for placement, and IUD inserted according to 's instructions without difficulty or significant resitance, and deployed at the " "fundus. The strings were visualized and trimmed to 3.0 cm from the external os. Tenaculum was removed and hemostasis noted. Speculum removed.  Patient tolerated procedure well.    Lot # [id number]  Exp: 11/2022    EBL: minimal    Complications: none.  Pt tearful after IUD insertion, not due to pain but triggering for childbirth. Pt declined option to discuss further.      Assessment:  Annual Gyn exam within normal limits  Mirena IUD insertion  Lower extremity edema    Plan:  Orders Placed This Encounter   Procedures     INSERTION INTRAUTERINE DEVICE     FLU VAC PRESRV FREE QUAD SPLIT VIR 3+YRS IM     hCG qual urine POCT     - Discussed appointment with internal medicine to further evaluation lower extremity symptoms.   - Additional teaching done at this visit included: self breast exam and birth control.   - Given 's handouts, including when to have IUD removed, list of danger s/sx, side effects and follow up recommended.  - Back up contraception advised for 7 days if progestin method. - Advised to call for any fever, for prolonged or severe pain or bleeding, abnormal vaginal discharge, or unable to palpate strings. - Encouraged use of pain medications (ibuprofen) as needed for mild to moderate pain.  - Offered follow-up in clinic in 4-6 weeks for IUD string check if desired  - Pap due 2021  -  RTC in one year for annual exam or with concerns.      \"I, Lane Millan, completed the PFSH and ROS. I then acted as a scribe for CNM for the remainder of the visit.\" Lane Millan, JULIAN, DNP Student.    The encounter was performed by me and scribed by the student. The scribed note accurately reflects my personal services and decisions made by me. -  GREGORIO Parkinson CNM    Answers for HPI/ROS submitted by the patient on 9/21/2020   CHRISTIANNE 7 TOTAL SCORE: 3  General Symptoms: No  Skin Symptoms: No  HENT Symptoms: No  EYE SYMPTOMS: No  HEART SYMPTOMS: No  LUNG SYMPTOMS: No  INTESTINAL SYMPTOMS: No  URINARY SYMPTOMS: " No  GYNECOLOGIC SYMPTOMS: No  BREAST SYMPTOMS: No  SKELETAL SYMPTOMS: No  BLOOD SYMPTOMS: No  NERVOUS SYSTEM SYMPTOMS: No  MENTAL HEALTH SYMPTOMS: Yes  Nervous or Anxious: Yes  Depression: No  Trouble sleeping: Yes  Trouble thinking or concentrating: No  Mood changes: No  Panic attacks: No

## 2020-10-09 ENCOUNTER — OFFICE VISIT (OUTPATIENT)
Dept: INTERNAL MEDICINE | Facility: CLINIC | Age: 40
End: 2020-10-09
Attending: ADVANCED PRACTICE MIDWIFE
Payer: COMMERCIAL

## 2020-10-09 VITALS
WEIGHT: 166 LBS | HEIGHT: 69 IN | HEART RATE: 60 BPM | SYSTOLIC BLOOD PRESSURE: 127 MMHG | DIASTOLIC BLOOD PRESSURE: 81 MMHG | BODY MASS INDEX: 24.59 KG/M2

## 2020-10-09 DIAGNOSIS — R20.0 NUMBNESS IN FEET: ICD-10-CM

## 2020-10-09 DIAGNOSIS — I83.812 VARICOSE VEINS OF LEFT LOWER EXTREMITY WITH PAIN: Primary | ICD-10-CM

## 2020-10-09 DIAGNOSIS — S16.1XXS STRAIN OF NECK MUSCLE, SEQUELA: ICD-10-CM

## 2020-10-09 DIAGNOSIS — L98.9 SKIN LESION: ICD-10-CM

## 2020-10-09 PROCEDURE — G0463 HOSPITAL OUTPT CLINIC VISIT: HCPCS

## 2020-10-09 PROCEDURE — 99204 OFFICE O/P NEW MOD 45 MIN: CPT | Performed by: INTERNAL MEDICINE

## 2020-10-09 ASSESSMENT — PAIN SCALES - GENERAL: PAINLEVEL: MILD PAIN (2)

## 2020-10-09 ASSESSMENT — ANXIETY QUESTIONNAIRES
7. FEELING AFRAID AS IF SOMETHING AWFUL MIGHT HAPPEN: NOT AT ALL
6. BECOMING EASILY ANNOYED OR IRRITABLE: NOT AT ALL
2. NOT BEING ABLE TO STOP OR CONTROL WORRYING: NOT AT ALL
GAD7 TOTAL SCORE: 2
GAD7 TOTAL SCORE: 2
5. BEING SO RESTLESS THAT IT IS HARD TO SIT STILL: NOT AT ALL
4. TROUBLE RELAXING: SEVERAL DAYS
7. FEELING AFRAID AS IF SOMETHING AWFUL MIGHT HAPPEN: NOT AT ALL
1. FEELING NERVOUS, ANXIOUS, OR ON EDGE: NOT AT ALL
3. WORRYING TOO MUCH ABOUT DIFFERENT THINGS: SEVERAL DAYS

## 2020-10-09 ASSESSMENT — ENCOUNTER SYMPTOMS
LIGHT-HEADEDNESS: 0
EXERCISE INTOLERANCE: 0
HYPOTENSION: 0
LEG PAIN: 0
HYPERTENSION: 0
PALPITATIONS: 1
ORTHOPNEA: 0
SLEEP DISTURBANCES DUE TO BREATHING: 0
SYNCOPE: 0

## 2020-10-09 ASSESSMENT — MIFFLIN-ST. JEOR: SCORE: 1497.35

## 2020-10-09 NOTE — LETTER
10/9/2020       RE: Belle Velez  [address]  [facility] [address]     Dear Colleague,    Thank you for referring your patient, Belle Velez, to the [facility] [place] at [facility]. Please see a copy of my visit note below.    HPI  Patient is here for evaluation of varicose veins of left lower extremity.  She states that she developed varicosities when she was still in high school.  She has noticed significant swelling of the left leg during her recent pregnancy.  She is wondering if something can be done for management of her leg edema.  Patient also states that she has noticed that her feet become numb when she is either biking or running.  She has tried different shoes with a good fit.  However she continues to have periodic numbness.  She denies persistent numbness or weakness in her leg muscles.  Patient is requesting referral to dermatology for evaluation of skin lesion.  She has a small papule on the right side of her face right next to the nose.  The papule developed fairly recently.  Patient also states that she has been struggling with neck pain.  She periodically develops significant pain and stiffness of the neck muscles.  She is wondering if she could be seen by physical therapy.    Review of Systems     Constitutional:  Negative for fever, chills, weight loss, weight gain, fatigue, decreased appetite, night sweats, recent stressors, height gain, height loss, post-operative complications, incisional pain, hallucinations, increased energy, hyperactivity and confused.   HENT:  Negative for ear pain, hearing loss, tinnitus, nosebleeds, trouble swallowing, hoarse voice, mouth sores, sore throat, ear discharge, tooth pain, gum tenderness, taste disturbance, smell disturbance, hearing aid, bleeding gums, dry mouth, sinus pain, sinus congestion and neck mass.    Eyes:  Negative for double vision, pain, redness, eye pain,  decreased vision, eye watering, eye bulging, eye dryness, flashing lights, spots, floaters, strabismus, tunnel vision, jaundice and eye irritation.   Respiratory:   Negative for cough, hemoptysis, sputum production, shortness of breath, wheezing, sleep disturbances due to breathing, snores loudly, respiratory pain, dyspnea on exertion, cough disturbing sleep and postural dyspnea.    Cardiovascular:  Positive for palpitations, few scattered varicosities and edema. Negative for chest pain, dyspnea on exertion, orthopnea, fingers/toes turn blue, hypertension, hypotension, syncope, history of heart murmur, pacemaker, leg pain, sleep disturbances due to breathing, light-headedness and exercise intolerance.   Gastrointestinal:  Negative for heartburn, nausea, vomiting, abdominal pain, diarrhea, constipation, blood in stool, melena, rectal pain, bloating, hemorrhoids, bowel incontinence, jaundice, rectal bleeding, coffee ground emesis and change in stool.   Genitourinary:  Negative for bladder incontinence, dysuria, urgency, hematuria, flank pain, vaginal discharge, difficulty urinating, genital sores, dyspareunia, decreased libido, nocturia, voiding less frequently, arousal difficulty, abnormal vaginal bleeding, excessive menstruation, menstrual changes, hot flashes, vaginal dryness and postmenopausal bleeding.   Musculoskeletal:  Negative for myalgias, back pain, joint swelling, arthralgias, stiffness, muscle cramps, neck pain, bone pain, muscle weakness and fracture.   Skin:  Negative for nail changes, itching, poor wound healing, rash, hair changes, skin changes, acne, warts, poor wound healing, scarring, flaky skin, Raynaud's phenomenon, sensitivity to sunlight and skin thickening.   Neurological:  Negative for dizziness, tingling, tremors, speech change, seizures, loss of consciousness, weakness, light-headedness, numbness, headaches, disturbances in coordination, extremity numbness, memory loss, difficulty walking  and paralysis.   Endo/Heme:  Negative for anemia, swollen glands and bruises/bleeds easily.   Psychiatric/Behavioral:  Negative for depression, hallucinations, memory loss, decreased concentration, mood swings and panic attacks.    Breast:  Negative for breast discharge, breast mass, breast pain and nipple retraction.   Endocrine:  Negative for altered temperature regulation, polyphagia, polydipsia, unwanted hair growth and change in facial hair.      Current Outpatient Medications   Medication     levonorgestrel (MIRENA) 20 MCG/24HR IUD     No current facility-administered medications for this visit.      Past Medical History:   Diagnosis Date     Anxiety      NO ACTIVE PROBLEMS      Skin lesion      Past Surgical History:   Procedure Laterality Date     NO HISTORY OF SURGERY       Family History   Problem Relation Age of Onset     Other Cancer Maternal Grandmother         Gall Bladder     Substance Abuse Maternal Grandfather         Alcoholic     Diabetes Paternal Grandfather         Type II     Social History     Socioeconomic History     Marital status:      Spouse name: Gerry     Number of children: Not on file     Years of education: Not on file     Highest education level: Not on file   Occupational History     Occupation:    Social Needs     Financial resource strain: Not on file     Food insecurity     Worry: Not on file     Inability: Not on file     Transportation needs     Medical: Not on file     Non-medical: Not on file   Tobacco Use     Smoking status: Never Smoker     Smokeless tobacco: Never Used   Substance and Sexual Activity     Alcohol use: Yes     Comment: up to 4 drinks per week max     Drug use: No     Sexual activity: Yes     Partners: Male     Birth control/protection: Condom   Lifestyle     Physical activity     Days per week: Not on file     Minutes per session: Not on file     Stress: Not on file   Relationships     Social connections     Talks on phone: Not on file      "Gets together: Not on file     Attends Yazidi service: Not on file     Active member of club or organization: Not on file     Attends meetings of clubs or organizations: Not on file     Relationship status: Not on file     Intimate partner violence     Fear of current or ex partner: Not on file     Emotionally abused: Not on file     Physically abused: Not on file     Forced sexual activity: Not on file   Other Topics Concern     Parent/sibling w/ CABG, MI or angioplasty before 65F 55M? Not Asked   Social History Narrative     Not on file       Vitals:    10/09/20 0842   BP: 127/81   Pulse: 60   Weight: 75.3 kg (166 lb)   Height: 1.753 m (5' 9\")         Physical Exam  Vitals signs and nursing note reviewed.   Constitutional:       Appearance: Normal appearance.   Eyes:      Pupils: Pupils are equal, round, and reactive to light.   Cardiovascular:      Rate and Rhythm: Normal rate.   Pulmonary:      Effort: Pulmonary effort is normal.   Abdominal:      General: Abdomen is flat.   Musculoskeletal:         General: Swelling (mild edema - left leg, several varicosities are seen) and edema present.   Skin:     General: Skin is warm and dry.   Neurological:      General: No focal deficit present.      Mental Status: She is alert and oriented to person, place, and time.   Psychiatric:         Mood and Affect: Mood normal.         Behavior: Behavior normal.         Thought Content: Thought content normal.         Judgment: Judgment normal.       Assessment and Plan:  Belle was seen today for establish care.    Diagnoses and all orders for this visit:    Varicose veins of left lower extremity with pain.  Discussed management of varicose veins with patient.  Recommend assessment with venous competency exam of left and right lower extremity.  Patient was also referred to vascular surgery for consultation.  -     US Venous Competency Left; Future  -     US Venous Competency Right; Future  -     VASCULAR SURGERY REFERRAL; " Future    Numbness in feet.  Strongly favor periodic nerve compression.  Recommend neurology evaluation.  -     NEUROLOGY ADULT REFERRAL    Skin lesion.  Suspect milia.  Patient was referred to dermatology for further evaluation.  -     DERMATOLOGY ADULT REFERRAL; Future    Strain of neck muscle, sequela.  Discussed causes of muscle pain with patient.  Given ongoing nature, recommend physical therapy.  Referral was placed today.  -     [facility], [facility] CHIROPRACTIC REFERRAL; Future    Total time spent 45  minutes.  More than 50% of the time spent with Ms. Velez on counseling / coordinating her care    Concepcion Russo MD              Answers for HPI/ROS submitted by the patient on 10/9/2020   CHRISTIANNE 7 TOTAL SCORE: 2

## 2020-10-09 NOTE — PROGRESS NOTES
HPI  Patient is here for evaluation of varicose veins of left lower extremity.  She states that she developed varicosities when she was still in high school.  She has noticed significant swelling of the left leg during her recent pregnancy.  She is wondering if something can be done for management of her leg edema.  Patient also states that she has noticed that her feet become numb when she is either biking or running.  She has tried different shoes with a good fit.  However she continues to have periodic numbness.  She denies persistent numbness or weakness in her leg muscles.  Patient is requesting referral to dermatology for evaluation of skin lesion.  She has a small papule on the right side of her face right next to the nose.  The papule developed fairly recently.  Patient also states that she has been struggling with neck pain.  She periodically develops significant pain and stiffness of the neck muscles.  She is wondering if she could be seen by physical therapy.    Review of Systems     Constitutional:  Negative for fever, chills, weight loss, weight gain, fatigue, decreased appetite, night sweats, recent stressors, height gain, height loss, post-operative complications, incisional pain, hallucinations, increased energy, hyperactivity and confused.   HENT:  Negative for ear pain, hearing loss, tinnitus, nosebleeds, trouble swallowing, hoarse voice, mouth sores, sore throat, ear discharge, tooth pain, gum tenderness, taste disturbance, smell disturbance, hearing aid, bleeding gums, dry mouth, sinus pain, sinus congestion and neck mass.    Eyes:  Negative for double vision, pain, redness, eye pain, decreased vision, eye watering, eye bulging, eye dryness, flashing lights, spots, floaters, strabismus, tunnel vision, jaundice and eye irritation.   Respiratory:   Negative for cough, hemoptysis, sputum production, shortness of breath, wheezing, sleep disturbances due to breathing, snores loudly, respiratory pain,  dyspnea on exertion, cough disturbing sleep and postural dyspnea.    Cardiovascular:  Positive for palpitations, few scattered varicosities and edema. Negative for chest pain, dyspnea on exertion, orthopnea, fingers/toes turn blue, hypertension, hypotension, syncope, history of heart murmur, pacemaker, leg pain, sleep disturbances due to breathing, light-headedness and exercise intolerance.   Gastrointestinal:  Negative for heartburn, nausea, vomiting, abdominal pain, diarrhea, constipation, blood in stool, melena, rectal pain, bloating, hemorrhoids, bowel incontinence, jaundice, rectal bleeding, coffee ground emesis and change in stool.   Genitourinary:  Negative for bladder incontinence, dysuria, urgency, hematuria, flank pain, vaginal discharge, difficulty urinating, genital sores, dyspareunia, decreased libido, nocturia, voiding less frequently, arousal difficulty, abnormal vaginal bleeding, excessive menstruation, menstrual changes, hot flashes, vaginal dryness and postmenopausal bleeding.   Musculoskeletal:  Negative for myalgias, back pain, joint swelling, arthralgias, stiffness, muscle cramps, neck pain, bone pain, muscle weakness and fracture.   Skin:  Negative for nail changes, itching, poor wound healing, rash, hair changes, skin changes, acne, warts, poor wound healing, scarring, flaky skin, Raynaud's phenomenon, sensitivity to sunlight and skin thickening.   Neurological:  Negative for dizziness, tingling, tremors, speech change, seizures, loss of consciousness, weakness, light-headedness, numbness, headaches, disturbances in coordination, extremity numbness, memory loss, difficulty walking and paralysis.   Endo/Heme:  Negative for anemia, swollen glands and bruises/bleeds easily.   Psychiatric/Behavioral:  Negative for depression, hallucinations, memory loss, decreased concentration, mood swings and panic attacks.    Breast:  Negative for breast discharge, breast mass, breast pain and nipple  retraction.   Endocrine:  Negative for altered temperature regulation, polyphagia, polydipsia, unwanted hair growth and change in facial hair.      Current Outpatient Medications   Medication     levonorgestrel (MIRENA) 20 MCG/24HR IUD     No current facility-administered medications for this visit.      Past Medical History:   Diagnosis Date     Anxiety      NO ACTIVE PROBLEMS      Skin lesion      Past Surgical History:   Procedure Laterality Date     NO HISTORY OF SURGERY       Family History   Problem Relation Age of Onset     Other Cancer Maternal Grandmother         Gall Bladder     Substance Abuse Maternal Grandfather         Alcoholic     Diabetes Paternal Grandfather         Type II     Social History     Socioeconomic History     Marital status:      Spouse name: Gerry     Number of children: Not on file     Years of education: Not on file     Highest education level: Not on file   Occupational History     Occupation:    Social Needs     Financial resource strain: Not on file     Food insecurity     Worry: Not on file     Inability: Not on file     Transportation needs     Medical: Not on file     Non-medical: Not on file   Tobacco Use     Smoking status: Never Smoker     Smokeless tobacco: Never Used   Substance and Sexual Activity     Alcohol use: Yes     Comment: up to 4 drinks per week max     Drug use: No     Sexual activity: Yes     Partners: Male     Birth control/protection: Condom   Lifestyle     Physical activity     Days per week: Not on file     Minutes per session: Not on file     Stress: Not on file   Relationships     Social connections     Talks on phone: Not on file     Gets together: Not on file     Attends [employer] service: Not on file     Active member of club or organization: Not on file     Attends meetings of clubs or organizations: Not on file     Relationship status: Not on file     Intimate partner violence     Fear of current or ex partner: Not on file      "Emotionally abused: Not on file     Physically abused: Not on file     Forced sexual activity: Not on file   Other Topics Concern     Parent/sibling w/ CABG, MI or angioplasty before 65F 55M? Not Asked   Social History Narrative     Not on file       Vitals:    10/09/20 0842   BP: 127/81   Pulse: 60   Weight: 75.3 kg (166 lb)   Height: 1.753 m (5' 9\")         Physical Exam  Vitals signs and nursing note reviewed.   Constitutional:       Appearance: Normal appearance.   Eyes:      Pupils: Pupils are equal, round, and reactive to light.   Cardiovascular:      Rate and Rhythm: Normal rate.   Pulmonary:      Effort: Pulmonary effort is normal.   Abdominal:      General: Abdomen is flat.   Musculoskeletal:         General: Swelling (mild edema - left leg, several varicosities are seen) and edema present.   Skin:     General: Skin is warm and dry.   Neurological:      General: No focal deficit present.      Mental Status: She is alert and oriented to person, place, and time.   Psychiatric:         Mood and Affect: Mood normal.         Behavior: Behavior normal.         Thought Content: Thought content normal.         Judgment: Judgment normal.       Assessment and Plan:  Belle was seen today for establish care.    Diagnoses and all orders for this visit:    Varicose veins of left lower extremity with pain.  Discussed management of varicose veins with patient.  Recommend assessment with venous competency exam of left and right lower extremity.  Patient was also referred to vascular surgery for consultation.  -     US Venous Competency Left; Future  -     US Venous Competency Right; Future  -     VASCULAR SURGERY REFERRAL; Future    Numbness in feet.  Strongly favor periodic nerve compression.  Recommend neurology evaluation.  -     NEUROLOGY ADULT REFERRAL    Skin lesion.  Suspect milia.  Patient was referred to dermatology for further evaluation.  -     DERMATOLOGY ADULT REFERRAL; Future    Strain of neck muscle, " sequela.  Discussed causes of muscle pain with patient.  Given ongoing nature, recommend physical therapy.  Referral was placed today.  -     [facility]ITA PT, HAND, AND CHIROPRACTIC REFERRAL; Future    Total time spent 45  minutes.  More than 50% of the time spent with Ms. Velez on counseling / coordinating her care    Concepcion Russo MD              Answers for HPI/ROS submitted by the patient on 10/9/2020   CHRISTIANNE 7 TOTAL SCORE: 2

## 2020-10-09 NOTE — NURSING NOTE
Chief Complaint   Patient presents with     Establish Care     C/O leg swelling   Lindy Curran LPN

## 2020-10-10 ASSESSMENT — ANXIETY QUESTIONNAIRES: GAD7 TOTAL SCORE: 2

## 2020-10-11 ASSESSMENT — ENCOUNTER SYMPTOMS
SEIZURES: 0
DOUBLE VISION: 0
JOINT SWELLING: 0
WEIGHT GAIN: 0
NAIL CHANGES: 0
DYSPNEA ON EXERTION: 0
LIGHT-HEADEDNESS: 0
EYE PAIN: 0
MYALGIAS: 0
DYSURIA: 0
SPEECH CHANGE: 0
NECK PAIN: 0
CHILLS: 0
BREAST MASS: 0
MUSCLE WEAKNESS: 0
NERVOUS/ANXIOUS: 0
DIZZINESS: 0
TROUBLE SWALLOWING: 0
EXERCISE INTOLERANCE: 0
RESPIRATORY PAIN: 0
FLANK PAIN: 0
DEPRESSION: 0
TINGLING: 0
SMELL DISTURBANCE: 0
WHEEZING: 0
EYE WATERING: 0
ORTHOPNEA: 0
ABDOMINAL PAIN: 0
BLOOD IN STOOL: 0
INCREASED ENERGY: 0
RECTAL PAIN: 0
COUGH DISTURBING SLEEP: 0
TASTE DISTURBANCE: 0
SORE THROAT: 0
INSOMNIA: 0
SWOLLEN GLANDS: 0
WEIGHT LOSS: 0
POLYPHAGIA: 0
MEMORY LOSS: 0
HEARTBURN: 0
HALLUCINATIONS: 0
SINUS PAIN: 0
RECTAL BLEEDING: 0
SPUTUM PRODUCTION: 0
POSTURAL DYSPNEA: 0
LOSS OF CONSCIOUSNESS: 0
LEG PAIN: 0
COUGH: 0
MUSCLE CRAMPS: 0
ALTERED TEMPERATURE REGULATION: 0
DECREASED APPETITE: 0
JAUNDICE: 0
SHORTNESS OF BREATH: 0
DECREASED CONCENTRATION: 0
WEAKNESS: 0
DISTURBANCES IN COORDINATION: 0
PARALYSIS: 0
SKIN CHANGES: 0
SYNCOPE: 0
SINUS CONGESTION: 0
BACK PAIN: 0
HYPERTENSION: 0
POOR WOUND HEALING: 0
SNORES LOUDLY: 0
BREAST PAIN: 0
POLYDIPSIA: 0
HOARSE VOICE: 0
VOMITING: 0
DECREASED LIBIDO: 0
NECK MASS: 0
HEMOPTYSIS: 0
CONSTIPATION: 0
EYE IRRITATION: 0
NIGHT SWEATS: 0
BRUISES/BLEEDS EASILY: 0
NUMBNESS: 0
HEMATURIA: 0
HEADACHES: 0
NAUSEA: 0
BLOATING: 0
HOT FLASHES: 0
HYPOTENSION: 0
DIFFICULTY URINATING: 0
BOWEL INCONTINENCE: 0
PANIC: 0
PALPITATIONS: 1
FATIGUE: 0
ARTHRALGIAS: 0
STIFFNESS: 0
DIARRHEA: 0
TREMORS: 0
EYE REDNESS: 0
FEVER: 0
EXTREMITY NUMBNESS: 0
SLEEP DISTURBANCES DUE TO BREATHING: 0

## 2020-10-23 ENCOUNTER — ANCILLARY PROCEDURE (OUTPATIENT)
Dept: ULTRASOUND IMAGING | Facility: CLINIC | Age: 40
End: 2020-10-23
Attending: INTERNAL MEDICINE
Payer: COMMERCIAL

## 2020-10-23 DIAGNOSIS — I83.812 VARICOSE VEINS OF LEFT LOWER EXTREMITY WITH PAIN: ICD-10-CM

## 2020-10-23 PROCEDURE — 93970 EXTREMITY STUDY: CPT | Mod: GC | Performed by: RADIOLOGY

## 2020-10-24 NOTE — TELEPHONE ENCOUNTER
DIAGNOSIS: referred by Dr. Russo for varicose veins   DATE RECEIVED: 10.26.20   NOTES STATUS DETAILS   OFFICE NOTE from referring provider Internal 10.9.20  Dr. Concepcion Russo  [facility]   OFFICE NOTE from other specialist N/A    OPERATIVE REPORT N/A    MEDICATION LIST Internal    PERTINENT LABS Internal    CTA (CT ANGIOGRAPHY) N/A    CT N/A    MRI N/A    ULTRASOUND Internal 10.23.20  US Venous Comp Ricci

## 2020-10-26 ENCOUNTER — PRE VISIT (OUTPATIENT)
Dept: VASCULAR SURGERY | Facility: CLINIC | Age: 40
End: 2020-10-26

## 2020-10-26 ENCOUNTER — VIRTUAL VISIT (OUTPATIENT)
Dept: VASCULAR SURGERY | Facility: CLINIC | Age: 40
End: 2020-10-26
Attending: INTERNAL MEDICINE
Payer: COMMERCIAL

## 2020-10-26 DIAGNOSIS — I83.812 VARICOSE VEINS OF LEFT LOWER EXTREMITY WITH PAIN: ICD-10-CM

## 2020-10-26 PROCEDURE — 99202 OFFICE O/P NEW SF 15 MIN: CPT | Mod: 95 | Performed by: NURSE PRACTITIONER

## 2020-10-26 ASSESSMENT — PAIN SCALES - GENERAL: PAINLEVEL: MILD PAIN (3)

## 2020-10-26 NOTE — Clinical Note
Hi Dr. Connell,  Would you review this patient's venous comp?  She is going to try compression stockings for 2 more months, but she has not had relief after 1 month of continuous use.

## 2020-10-26 NOTE — LETTER
"10/26/2020       RE: Belle Velez  [address]  [place] [facility] [place] [address]     Dear Colleague,    Thank you for referring your patient, Belle Velez, to the [facility] VASCULAR CLINIC [place] at [facility]. Please see a copy of my visit note below.    VASCULAR SURGERY VIDEO VISIT NOTE:  Reason for Visit:  Varicose Veins    Belle Velez is a 39 year old female who is being evaluated via a billable video visit.      The patient has been notified of following:     \"This video visit will be conducted via a call between you and your physician/provider. We have found that certain health care needs can be provided without the need for an in-person physical exam.  This service lets us provide the care you need with a video conversation.  If a prescription is necessary we can send it directly to your pharmacy.  If lab work is needed we can place an order for that and you can then stop by our lab to have the test done at a later time.    Video visits are billed at different rates depending on your insurance coverage.  Please reach out to your insurance provider with any questions.    If during the course of the call the physician/provider feels a video visit is not appropriate, you will not be charged for this service.\"    Patient has given verbal consent for Video visit? Yes  How would you like to obtain your AVS? MyChart  If you are dropped from the video visit, the video invite should be resent to: Other e-mail: my chart connect  Will anyone else be joining your video visit? No        Video-Visit Details    Type of service:  Video Visit    Video Call Duration: 22:41 minutes    Originating Location (pt. Location): Home    Distant Location (provider location):  [facility] VASCULAR [facility]     Platform used for Video Visit: Doximity        HPI:  Belle Velez is a 39 year old health female who was referred to our clinic for evaluation of " varicose veins. She reports she fist noticed a varicose vein in her left thigh in high school and another behind her knee in college.  They had not been bothersome until she became pregnant in 2016.  At that time she started having significant LLE swelling, but this improved after pregnancy.  She was able to relieve her symptoms with swimming and compression.  About a month ago, she noticed worsening in her symptoms and has started wearing a moderate strength OTC compression stockings everyday for a month now.  She reports that even with the stockings, she continues to get swelling and pressure in her lower leg and ankle that increases throughout the day.  She has also noted and increase in spider veins on her LLE.  She denies any significant trauma or bleeding/clotting disorders in the family.  She states her mother had varicose veins and her paternal aunt had varicosities that required a procedure.  She works as an  and is able to ambulate throughout the day and uses a stand up desk.    She is also concerned about right foot numbness that begins about 1 minute into exercise such as biking and running.  The numbness begins in her toe and spreads to the bottom of her foot.  It eventually resolves after 15-20 minutes of exercise.  She was referred to neurology for this.      PHYSICAL EXAM:  General: The patient is alert and oriented.  Moves all extremities.   HEENT: Color appropriate for race, warm, dry  Lungs: Breathing unlabored    EXTREMITIES: Skin over the extremities intact without discoloration. Wearing compression stockings  Neurologic: Grossly intact, EOMs grossly intact      Results:  Exam: US VENOUS COMPENTENCY BILATERAL 10/23/2020 2:31 PM                                                                    Impression:     1. Right lea. No deep venous thrombus.  1b. No deep incompetent veins  1c. Incompetent greater saphenous vein at the saphenofemoral junction  and proximal thigh  1d.  Incompetent varicose veins and perforating veins as described     2. Left lea. No deep venous thrombus  2b. No deep incompetent veins  2c. No superficial incompetent veins  2d. Incompetent varicose veins and  as described    Assessment:  39 year old female with symptomatic varicose veins    Plan:  -Will refer to Dr. Connell for review of venous comp  -Prescription compression stockings ordered, continue to wear daily for 2 months  -Avoid prolonged periods of sitting or standing in one place and continue with frequent ambulation  -Elevate extremities when at rest  -Right arterial duplex ordered to evaluate for popliteal entrapment  -Patient should follow up with neurology            Ariela Canales DNP, APRN, AGNP-C  Division of Vascular Surgery   [facility]  Pager: [phone]              Again, thank you for allowing me to participate in the care of your patient.      Sincerely,    Ariela Canales, NP

## 2020-10-26 NOTE — NURSING NOTE
Vascular Rooming Note     Belle Velez's goals for this visit include:   Chief Complaint   Patient presents with     Consult     Belle, is participating  in a virtual visit today fora consult regarding varicose veins, swelling  in the left leg everyday, wears compression stockings which does help, had condition for years got worse in 2016 while pregnant, as reported by patient.     Phylicia Samano, TRAVISN

## 2020-10-26 NOTE — LETTER
10/26/2020       RE: Belle Velez  [address]  [place] [address]     Dear Colleague,    Thank you for referring your patient, Belle Velez, to the [facility] VASCULAR CLINIC [place] at [facility]. Please see a copy of my visit note below.    VASCULAR SURGERY VIDEO VISIT NOTE:  Reason for Visit:  Varicose Veins    HPI:  Belle Velez is a 39 year old health female who was referred to our clinic for evaluation of varicose veins. She reports she fist noticed a varicose vein in her left thigh in high school and another behind her knee in college.  They had not been bothersome until she became pregnant in 2016.  At that time she started having significant LLE swelling, but this improved after pregnancy.  She was able to relieve her symptoms with swimming and compression.  About a month ago, she noticed worsening in her symptoms and has started wearing a moderate strength OTC compression stockings everyday for a month now.  She reports that even with the stockings, she continues to get swelling and pressure in her lower leg and ankle that increases throughout the day.  She has also noted and increase in spider veins on her LLE.  She denies any significant trauma or bleeding/clotting disorders in the family.  She states her mother had varicose veins and her paternal aunt had varicosities that required a procedure.  She works as an  and is able to ambulate throughout the day and uses a stand up desk.    She is also concerned about right foot numbness that begins about 1 minute into exercise such as biking and running.  The numbness begins in her toe and spreads to the bottom of her foot.  It eventually resolves after 15-20 minutes of exercise.  She was referred to neurology for this.    PHYSICAL EXAM:  General: The patient is alert and oriented.  Moves all extremities.   HEENT: Color appropriate for race, warm, dry  Lungs: Breathing unlabored     EXTREMITIES: Skin over the extremities intact without discoloration. Wearing compression stockings  Neurologic: Grossly intact, EOMs grossly intact    Results:  Exam: US VENOUS COMPENTENCY BILATERAL 10/23/2020 2:31 PM    Impression:    1. Right lea. No deep venous thrombus.  1b. No deep incompetent veins  1c. Incompetent greater saphenous vein at the saphenofemoral junction  and proximal thigh  1d. Incompetent varicose veins and perforating veins as described     2. Left lea. No deep venous thrombus  2b. No deep incompetent veins  2c. No superficial incompetent veins  2d. Incompetent varicose veins and  as described    Assessment:  39 year old female with symptomatic varicose veins    Plan:  -Will refer to Dr. Connell for review of venous comp  -Prescription compression stockings ordered, continue to wear daily for 2 months  -Avoid prolonged periods of sitting or standing in one place and continue with frequent ambulation  -Elevate extremities when at rest  -Right arterial duplex ordered to evaluate for popliteal entrapment  -Patient should follow up with neurology      Again, thank you for allowing me to participate in the care of your patient.  Sincerely,    Ariela Canales DNP, APRN, AGNP-C  Division of Vascular Surgery   [facility]  Pager: [phone]

## 2020-10-26 NOTE — PROGRESS NOTES
"VASCULAR SURGERY VIDEO VISIT NOTE:  Reason for Visit:  Varicose Veins    Belle Velez is a 39 year old female who is being evaluated via a billable video visit.      The patient has been notified of following:     \"This video visit will be conducted via a call between you and your physician/provider. We have found that certain health care needs can be provided without the need for an in-person physical exam.  This service lets us provide the care you need with a video conversation.  If a prescription is necessary we can send it directly to your pharmacy.  If lab work is needed we can place an order for that and you can then stop by our lab to have the test done at a later time.    Video visits are billed at different rates depending on your insurance coverage.  Please reach out to your insurance provider with any questions.    If during the course of the call the physician/provider feels a video visit is not appropriate, you will not be charged for this service.\"    Patient has given verbal consent for Video visit? Yes  How would you like to obtain your AVS? MyChart  If you are dropped from the video visit, the video invite should be resent to: Other e-mail: my chart connect  Will anyone else be joining your video visit? No        Video-Visit Details    Type of service:  Video Visit    Video Call Duration: 22:41 minutes    Originating Location (pt. Location): Home    Distant Location (provider location):  [facility] VASCULAR CLINIC [place]     Platform used for Video Visit: HealthyOut        HPI:  Belle Velez is a 39 year old health female who was referred to our clinic for evaluation of varicose veins. She reports she fist noticed a varicose vein in her left thigh in high school and another behind her knee in college.  They had not been bothersome until she became pregnant in 2016.  At that time she started having significant LLE swelling, but this improved after pregnancy.  She was able to relieve " her symptoms with swimming and compression.  About a month ago, she noticed worsening in her symptoms and has started wearing a moderate strength OTC compression stockings everyday for a month now.  She reports that even with the stockings, she continues to get swelling and pressure in her lower leg and ankle that increases throughout the day.  She has also noted and increase in spider veins on her LLE.  She denies any significant trauma or bleeding/clotting disorders in the family.  She states her mother had varicose veins and her paternal aunt had varicosities that required a procedure.  She works as an  and is able to ambulate throughout the day and uses a stand up desk.    She is also concerned about right foot numbness that begins about 1 minute into exercise such as biking and running.  The numbness begins in her toe and spreads to the bottom of her foot.  It eventually resolves after 15-20 minutes of exercise.  She was referred to neurology for this.      PHYSICAL EXAM:  General: The patient is alert and oriented.  Moves all extremities.   HEENT: Color appropriate for race, warm, dry  Lungs: Breathing unlabored    EXTREMITIES: Skin over the extremities intact without discoloration. Wearing compression stockings  Neurologic: Grossly intact, EOMs grossly intact      Results:  Exam: US VENOUS COMPENTENCY BILATERAL 10/23/2020 2:31 PM                                                                    Impression:     1. Right lea. No deep venous thrombus.  1b. No deep incompetent veins  1c. Incompetent greater saphenous vein at the saphenofemoral junction  and proximal thigh  1d. Incompetent varicose veins and perforating veins as described     2. Left lea. No deep venous thrombus  2b. No deep incompetent veins  2c. No superficial incompetent veins  2d. Incompetent varicose veins and  as described    Assessment:  39 year old female with symptomatic varicose veins    Plan:  -Will  refer to Dr. Connell for review of venous comp  -Prescription compression stockings ordered, continue to wear daily for 2 months  -Avoid prolonged periods of sitting or standing in one place and continue with frequent ambulation  -Elevate extremities when at rest  -Right arterial duplex ordered to evaluate for popliteal entrapment  -Patient should follow up with neurology            Ariela Canales DNP, APRN, AGNP-C  Division of Vascular Surgery   [facility]  Pager: [phone]

## 2020-10-26 NOTE — PATIENT INSTRUCTIONS
-Continue to wear your compression stockings daily and remove at night  -Avoid prolonged periods of sitting or standing in 1 spot and walk frequently  -Elevate legs when at rest  -Right leg ultrasound ordered  -Follow up with neurology  -I will forward your results onto Dr. Connell for review. Please notify us if you continue to have symptoms after 2 more months of wearing stockings.    With questions, concerns, or to request an appointment, please call either:    Marcella Slaughter, Care Coordinator RN, Vascular Surgery  [phone]    Vascular Call Center  [phone]    To contact someone after 5 pm, on a weekend, or on a Holiday, please call:  [facility]  [phone], option 4 to have the attending physician for Vascular Surgery Service paged.

## 2020-10-27 ENCOUNTER — TELEPHONE (OUTPATIENT)
Dept: VASCULAR SURGERY | Facility: CLINIC | Age: 40
End: 2020-10-27

## 2020-10-27 NOTE — TELEPHONE ENCOUNTER
JOHAN for patient regarding appointment scheduled with  on 11/3 at 11:00 for a video visit referred by Ariela Canales.    Call back number was provided.

## 2020-10-29 NOTE — PROGRESS NOTES
[facility] Initial Evaluation    Subjective:  Belle Velez is a 39 year old female with a cervical condition. Location of symptoms: bilateral cervical paraspinals, bilateral upper traps and levator scap, left scapula, occasionally into left shoulder and upper arm. Associated symptoms: tension.  Symptoms are exacerbated by: lifting, reading, working, concentrating, driving, sleeping, cervical flexion, sitting, moving head. Symptoms are relieved by: walking, being active.  Special tests (x-ray, MRI, CT scan, EMG, bone scan): None. Date on order: October 2020     Answers for HPI/ROS submitted by the patient on 10/30/2020   History Reported by Patient   Reason for Visit:: Recurring neck strain/spasm  When problem began:: 1/1/2010  How problem occurred:: Muscle tension, desk job  Number scale: 2/10  General health as reported by patient: good  Please check all that apply to your current or past medical history: migraines/headaches, numbness/tingling, pain at night/rest  Medical allergies: none  Surgeries: none  Medications you are currently taking: none  Occupation::   What are your primary job tasks: computer work, prolonged sitting, prolonged standing    Objective  Posture     Sitting (good/fair/poor):  poor  Standing (good/fair/poor):  fair  Protruded head (yes/no):  no  Wry neck (right/left/nil):  nil  Relevant wry neck (yes/no):  no  Correction of posture (better/worse/no effect): better    Neurological    Myotomes L R   C4 (shoulder elevation) 5/5 5/5   C5 (shoulder abduction) 5/5 5/5   C6 (elbow flexion) 5/5 5/5   C7 (elbow extension) 5/5 5/5   C8 (thumb extension) 5/5 5/5   T1 (finger add/abd) 5/5 5/5     Sensory Deficit, Reflexes, Dural Signs: cervical dermatomes WNL    Cervical Movement Loss Andrés Mod Min Nil Pain   Protrusion    x    Flexion   x  +   Retraction   x  +   Extension   x  +   Rotation Left   x  +   Rotation Right    x    Lateral Flexion Right    x    Lateral  Flexion Left   x  +     Assessment/Plan:    Patient is a 39 year old female with cervical complaints.    Patient has the following significant findings with corresponding treatment plan.                Diagnosis 1:  Neck pain  Pain -  manual therapy, self management, education, directional preference exercise and home program  Decreased ROM/flexibility - manual therapy and therapeutic exercise  Decreased joint mobility - manual therapy and therapeutic exercise  Decreased strength - therapeutic exercise and therapeutic activities  Impaired muscle performance - neuro re-education  Decreased function - therapeutic activities  Impaired posture - neuro re-education    Previous and current functional limitations:  (See Goal Flow Sheet for this information)    Short term and Long term goals: (See Goal Flow Sheet for this information)     Communication ability:  Patient appears to be able to clearly communicate and understand verbal and written communication and follow directions correctly.  Treatment Explanation - The following has been discussed with the patient:   RX ordered/plan of care  Anticipated outcomes  Possible risks and side effects  This patient would benefit from PT intervention to resume normal activities.   Rehab potential is good.    Frequency:  1 X week, once daily  Duration:  for 4 weeks tapering to 2 X a month over 1 month  Discharge Plan:  Achieve all LTG.  Independent in home treatment program.  Reach maximal therapeutic benefit.    Please refer to the daily flowsheet for treatment today, total treatment time and time spent performing 1:1 timed codes.

## 2020-10-30 ENCOUNTER — THERAPY VISIT (OUTPATIENT)
Dept: PHYSICAL THERAPY | Facility: CLINIC | Age: 40
End: 2020-10-30
Attending: INTERNAL MEDICINE
Payer: COMMERCIAL

## 2020-10-30 DIAGNOSIS — S16.1XXS STRAIN OF NECK MUSCLE, SEQUELA: ICD-10-CM

## 2020-10-30 DIAGNOSIS — M54.2 NECK PAIN: ICD-10-CM

## 2020-10-30 PROCEDURE — 97110 THERAPEUTIC EXERCISES: CPT | Mod: GP | Performed by: PHYSICAL THERAPIST

## 2020-10-30 PROCEDURE — 97161 PT EVAL LOW COMPLEX 20 MIN: CPT | Mod: GP | Performed by: PHYSICAL THERAPIST

## 2020-10-30 PROCEDURE — 97530 THERAPEUTIC ACTIVITIES: CPT | Mod: GP | Performed by: PHYSICAL THERAPIST

## 2020-11-02 NOTE — TELEPHONE ENCOUNTER
DIAGNOSIS: varicose veins referred by Ariela   DATE RECEIVED: 11.3.20   NOTES STATUS DETAILS   OFFICE NOTE from referring provider Internal 10.26.20  Ariela Canales NP  [facility]   OFFICE NOTE from other specialist Internal 10.9.20  Dr. Concepcion Russo  [facility]   OPERATIVE REPORT N/A    MEDICATION LIST Internal    PERTINENT LABS Internal    CTA (CT ANGIOGRAPHY) N/A    CT N/A    MRI N/A    ULTRASOUND Internal 10.23.20  US Venous Competency Ricci

## 2020-11-03 ENCOUNTER — PRE VISIT (OUTPATIENT)
Dept: VASCULAR SURGERY | Facility: CLINIC | Age: 40
End: 2020-11-03

## 2020-11-06 ENCOUNTER — THERAPY VISIT (OUTPATIENT)
Dept: PHYSICAL THERAPY | Facility: CLINIC | Age: 40
End: 2020-11-06
Payer: COMMERCIAL

## 2020-11-06 DIAGNOSIS — M54.2 NECK PAIN: ICD-10-CM

## 2020-11-06 PROCEDURE — 97110 THERAPEUTIC EXERCISES: CPT | Mod: GP | Performed by: PHYSICAL THERAPIST

## 2020-11-12 ENCOUNTER — PRE VISIT (OUTPATIENT)
Dept: NEUROLOGY | Facility: CLINIC | Age: 40
End: 2020-11-12

## 2020-11-12 NOTE — TELEPHONE ENCOUNTER
FUTURE VISIT INFORMATION      FUTURE VISIT INFORMATION:    Date: 11/16/2020    Time: 845am    [place] [facility] [place] INFORMATION:    Referring provider:  Dr. Russo    Referring providers clinic:  [facility]     Reason for visit/diagnosis  Numbness     RECORDS REQUESTED FROM:       Clinic name Comments Records Status Imaging Status   Internal Dr. Russo-10/9/2020    Ariela Canales-10/26/2020 Epic N/A

## 2020-11-17 ENCOUNTER — VIRTUAL VISIT (OUTPATIENT)
Dept: VASCULAR SURGERY | Facility: CLINIC | Age: 40
End: 2020-11-17
Payer: COMMERCIAL

## 2020-11-17 DIAGNOSIS — I83.812 VARICOSE VEINS OF LEFT LOWER EXTREMITY WITH PAIN: Primary | ICD-10-CM

## 2020-11-17 PROCEDURE — 99204 OFFICE O/P NEW MOD 45 MIN: CPT | Mod: 95 | Performed by: SURGERY

## 2020-11-17 ASSESSMENT — PAIN SCALES - GENERAL: PAINLEVEL: NO PAIN (0)

## 2020-11-17 NOTE — LETTER
"11/17/2020       RE: Belle Velez  [address]  [place] [address]     Dear Colleague,    Thank you for referring your patient, Belle Velez, to the [facility] [place] [facility]. Please see a copy of my visit note below.    Belle Velez is a 39 year old female who is being evaluated via a billable video visit.      The patient has been notified of following:     \"This video visit will be conducted via a call between you and your physician/provider. We have found that certain health care needs can be provided without the need for an in-person physical exam.  This service lets us provide the care you need with a video conversation.  If a prescription is necessary we can send it directly to your pharmacy.  If lab work is needed we can place an order for that and you can then stop by our lab to have the test done at a later time.    Video visits are billed at different rates depending on your insurance coverage.  Please reach out to your insurance provider with any questions.    If during the course of the call the physician/provider feels a video visit is not appropriate, you will not be charged for this service.\"    Patient has given verbal consent for Video visit? Yes  How would you like to obtain your AVS? MyChart  If you are dropped from the video visit, the video invite should be resent to: Other e-mail: my chart connect  Will anyone else be joining your video visit? No    Video-Visit Details  Please refer to my note (DR. Hart) today for these details.       Vascular Surgery Video Consultation Note     Patient:  Belle Velez   Date of birth 1980, Medical record number [id number]  Date of Visit:  11/17/2020  Consult Requester: Ariela Canales NP Vascular Surgery            Assessment and Recommendations:   ASSESSMENT / RECOMMENDATION:  Mrs. Velez is a 39 yr old female referred for evaluation of left " leg swelling.     - Venous duplex revealed no evidence of venous incompetence in the left leg. She has two varicosities in the lower leg but no reflux in the deep or superficial venous system.  - Main complaint is swelling. Her veins in the left leg are twice the size of those on the right. I would like to rule out left iliac vein compression as the cause. I explained to her that if we do find may thurner anatomy on imaging, we would not stent this. She has no history of thrombosis. She would like to know the cause of the swelling regardless.  - Recommended continued compression stockings.  - Unlikely lymphedema as the swelling does not involve her foot at this time.   - Will get MRV of abdomen/pelvis and have a follow up video visit to review this.       Many thanks for involving me in the care of this very pleasant patient. Should any questions or concerns arise, please don't hesitate to contact me.    Warm Regards,    Clary Hart MD, RPVI  , Vascular Surgery  [facility]  Cell: [phone]  [email or web address]           History of Present Illness:   Mrs. Velez is a 40yo female with history of left leg swelling. This originally started and was quite severe during a pregnancy 4 years ago. It was isolate to her left leg. She has no history of DVT in either leg. After her pregnancy, the swelling mostly went away. She had some small varicose veins in the right inner/posterior thigh and a small area above her ankle medially.     Around this summer, the swelling began to worsen again. It has been persistent since that time. She is wearing compression stockings which mostly resolves the symptoms but she will still have a little swelling despite those. She has minimal pain. Only a feeling of fullness sometimes. Activity does not make it better or worse. She does notice the swelling is better in the mornings.     She does not smoke. She has a mirena IUD recently placed. No family  history of hypercoagulable disorders.            Review of Systems:   CONSTITUTIONAL:  No fevers or chills  EYES: negative for icterus  ENT:  negative for hearing loss, tinnitus and sore throat  RESPIRATORY:  negative for cough with sputum and dyspnea  CARDIOVASCULAR:  negative for chest pain, dyspnea  GASTROINTESTINAL:  negative for nausea, vomiting, diarrhea and constipation  GENITOURINARY:  negative for dysuria  HEME:  No easy bruising  INTEGUMENT:  negative for rash and pruritus  NEURO:  Negative for headache           Past Medical History:     Past Medical History:   Diagnosis Date     Anxiety      NO ACTIVE PROBLEMS      Skin lesion             Past Surgical History:     Past Surgical History:   Procedure Laterality Date     NO HISTORY OF SURGERY              Family History:     Family History   Problem Relation Age of Onset     Other Cancer Maternal Grandmother         Gall Bladder     Substance Abuse Maternal Grandfather         Alcoholic     Diabetes Paternal Grandfather         Type II            Social History:     Social History     Tobacco Use     Smoking status: Never Smoker     Smokeless tobacco: Never Used   Substance Use Topics     Alcohol use: Yes     Comment: up to 4 drinks per week max     History   Sexual Activity     Sexual activity: Yes     Partners: Male     Birth control/ protection: Condom            Current Medications (antimicrobials listed in bold):   Mirena IUD (no other hormones or birth control)         Allergies:   No Known Allergies         Physical Inspection:     GENERAL:  well-developed, well-nourished, in no acute distress.   HEENT:  Head is normocephalic, atraumatic   EYES:  Eyes are clear with anicteric sclerae.  ENT:  Oropharynx is moist without exudates or ulcers. Tongue is midline  NECK:  Midline trachea without masses appreciated on visual inspection.  LUNGS:  Unlabored breathing.  SKIN:  No acute rashes noted..    NEUROLOGIC:  Grossly nonfocal. Smile is symmetric. Active  "x 4 extremities  PULSES: Hands, fingers, and toes appear pink and well-perfused.  EXTREMITIES: Mild swelling of left leg. No significant varicosities.          Laboratory Data:     Hematology Studies    Recent Labs   Lab Test 11/10/16  0731 16  1331 16   WBC  --  16.7*  --   --    ANEU  --  14.1*  --   --    AEOS  --  0.0  --   --    HGB 10.8* 13.3 11.0* 12.9   MCV  --  96  --   --    PLT  --  216  --  299       Imaging Studies  Left leg venous ultrasound:  Impression:     1. Right lea. No deep venous thrombus.  1b. No deep incompetent veins  1c. Incompetent greater saphenous vein at the saphenofemoral junction  and proximal thigh  1d. Incompetent varicose veins and perforating veins as described     2. Left lea. No deep venous thrombus  2b. No deep incompetent veins  2c. No superficial incompetent veins  2d. Incompetent varicose veins and  as described    Total time spent 25 minutes face to face with patient with more than 50% time spent in counseling and coordination of care. I spent an additional 15 minutes on chart review and image reviewing in anticipation of this visit.     Belle Velez is a 39 year old female who is being evaluated via a billable video visit.      The patient has been notified of following:     \"This video visit will be conducted via a call between you and your physician/provider. We have found that certain health care needs can be provided without the need for an in-person physical exam.  This service lets us provide the care you need with a video conversation.  If a prescription is necessary we can send it directly to your pharmacy.  If lab work is needed we can place an order for that and you can then stop by our lab to have the test done at a later time.    Video visits are billed at different rates depending on your insurance coverage.  Please reach out to your insurance provider with any questions.    If during the course of the " "call the physician/provider feels a video visit is not appropriate, you will not be charged for this service.\"    Video-Visit Details    Type of service:  Video Visit    Video Start Time: 1235  Video End Time: 1255    Originating Location (pt. Location): Home    Distant Location (provider location):  [facility] [place]     Platform used for Video Visit: Gerber Hart  "

## 2020-11-17 NOTE — PROGRESS NOTES
Vascular Surgery Video Consultation Note     Patient:  Belle Velez   Date of birth 1980, Medical record number [id number]  Date of Visit:  11/17/2020  Consult Requester: Ariela Canales NP Vascular Surgery            Assessment and Recommendations:   ASSESSMENT / RECOMMENDATION:  Mrs. Velez is a 39 yr old female referred for evaluation of left leg swelling.     - Venous duplex revealed no evidence of venous incompetence in the left leg. She has two varicosities in the lower leg but no reflux in the deep or superficial venous system.  - Main complaint is swelling. Her veins in the left leg are twice the size of those on the right. I would like to rule out left iliac vein compression as the cause. I explained to her that if we do find may thurner anatomy on imaging, we would not stent this. She has no history of thrombosis. She would like to know the cause of the swelling regardless.  - Recommended continued compression stockings.  - Unlikely lymphedema as the swelling does not involve her foot at this time.   - Will get MRV of abdomen/pelvis and have a follow up video visit to review this.       Many thanks for involving me in the care of this very pleasant patient. Should any questions or concerns arise, please don't hesitate to contact me.    Warm Regards,    Clary Hart MD, RPVI  , Vascular Surgery  [facility]  Cell: [phone]  [email or web address]           History of Present Illness:   Mrs. Velez is a 38yo female with history of left leg swelling. This originally started and was quite severe during a pregnancy 4 years ago. It was isolate to her left leg. She has no history of DVT in either leg. After her pregnancy, the swelling mostly went away. She had some small varicose veins in the right inner/posterior thigh and a small area above her ankle medially.     Around this summer, the swelling began to worsen again. It has been persistent since that time.  She is wearing compression stockings which mostly resolves the symptoms but she will still have a little swelling despite those. She has minimal pain. Only a feeling of fullness sometimes. Activity does not make it better or worse. She does notice the swelling is better in the mornings.     She does not smoke. She has a mirena IUD recently placed. No family history of hypercoagulable disorders.            Review of Systems:   CONSTITUTIONAL:  No fevers or chills  EYES: negative for icterus  ENT:  negative for hearing loss, tinnitus and sore throat  RESPIRATORY:  negative for cough with sputum and dyspnea  CARDIOVASCULAR:  negative for chest pain, dyspnea  GASTROINTESTINAL:  negative for nausea, vomiting, diarrhea and constipation  GENITOURINARY:  negative for dysuria  HEME:  No easy bruising  INTEGUMENT:  negative for rash and pruritus  NEURO:  Negative for headache           Past Medical History:     Past Medical History:   Diagnosis Date     Anxiety      NO ACTIVE PROBLEMS      Skin lesion             Past Surgical History:     Past Surgical History:   Procedure Laterality Date     NO HISTORY OF SURGERY              Family History:     Family History   Problem Relation Age of Onset     Other Cancer Maternal Grandmother         Gall Bladder     Substance Abuse Maternal Grandfather         Alcoholic     Diabetes Paternal Grandfather         Type II            Social History:     Social History     Tobacco Use     Smoking status: Never Smoker     Smokeless tobacco: Never Used   Substance Use Topics     Alcohol use: Yes     Comment: up to 4 drinks per week max     History   Sexual Activity     Sexual activity: Yes     Partners: Male     Birth control/ protection: Condom            Current Medications (antimicrobials listed in bold):   Mirena IUD (no other hormones or birth control)         Allergies:   No Known Allergies         Physical Inspection:     GENERAL:  well-developed, well-nourished, in no acute distress.  "  HEENT:  Head is normocephalic, atraumatic   EYES:  Eyes are clear with anicteric sclerae.  ENT:  Oropharynx is moist without exudates or ulcers. Tongue is midline  NECK:  Midline trachea without masses appreciated on visual inspection.  LUNGS:  Unlabored breathing.  SKIN:  No acute rashes noted..    NEUROLOGIC:  Grossly nonfocal. Smile is symmetric. Active x 4 extremities  PULSES: Hands, fingers, and toes appear pink and well-perfused.  EXTREMITIES: Mild swelling of left leg. No significant varicosities.          Laboratory Data:     Hematology Studies    Recent Labs   Lab Test 11/10/16  0731 16  1331 16   WBC  --  16.7*  --   --    ANEU  --  14.1*  --   --    AEOS  --  0.0  --   --    HGB 10.8* 13.3 11.0* 12.9   MCV  --  96  --   --    PLT  --  216  --  299       Imaging Studies  Left leg venous ultrasound:  Impression:     1. Right lea. No deep venous thrombus.  1b. No deep incompetent veins  1c. Incompetent greater saphenous vein at the saphenofemoral junction  and proximal thigh  1d. Incompetent varicose veins and perforating veins as described     2. Left lea. No deep venous thrombus  2b. No deep incompetent veins  2c. No superficial incompetent veins  2d. Incompetent varicose veins and  as described    Total time spent 25 minutes face to face with patient with more than 50% time spent in counseling and coordination of care. I spent an additional 15 minutes on chart review and image reviewing in anticipation of this visit.     Belle Velez is a 39 year old female who is being evaluated via a billable video visit.      The patient has been notified of following:     \"This video visit will be conducted via a call between you and your physician/provider. We have found that certain health care needs can be provided without the need for an in-person physical exam.  This service lets us provide the care you need with a video conversation.  If a prescription is " "necessary we can send it directly to your pharmacy.  If lab work is needed we can place an order for that and you can then stop by our lab to have the test done at a later time.    Video visits are billed at different rates depending on your insurance coverage.  Please reach out to your insurance provider with any questions.    If during the course of the call the physician/provider feels a video visit is not appropriate, you will not be charged for this service.\"    Video-Visit Details    Type of service:  Video Visit    Video Start Time: 1235  Video End Time: 1255    Originating Location (pt. Location): Home    Distant Location (provider location):  [facility] [place]     Platform used for Video Visit: Gerber Hart           "

## 2020-11-17 NOTE — NURSING NOTE
Vascular Rooming Note     Belle Velez's goals for this visit include:   Chief Complaint   Patient presents with     Consult     Belle, is participating in a virtual visit today for a consult regarding varicose veins, condition hasn't changed, as reported by patient.     Phylicia Samano LPN

## 2020-11-17 NOTE — PROGRESS NOTES
"Belle Velez is a 39 year old female who is being evaluated via a billable video visit.      The patient has been notified of following:     \"This video visit will be conducted via a call between you and your physician/provider. We have found that certain health care needs can be provided without the need for an in-person physical exam.  This service lets us provide the care you need with a video conversation.  If a prescription is necessary we can send it directly to your pharmacy.  If lab work is needed we can place an order for that and you can then stop by our lab to have the test done at a later time.    Video visits are billed at different rates depending on your insurance coverage.  Please reach out to your insurance provider with any questions.    If during the course of the call the physician/provider feels a video visit is not appropriate, you will not be charged for this service.\"    Patient has given verbal consent for Video visit? Yes  How would you like to obtain your AVS? MyChart  If you are dropped from the video visit, the video invite should be resent to: Other e-mail: my chart connect  Will anyone else be joining your video visit? No        Video-Visit Details  Please refer to my note (DR. Hart) today for these details.   "

## 2020-11-19 ENCOUNTER — OFFICE VISIT (OUTPATIENT)
Dept: DERMATOLOGY | Facility: CLINIC | Age: 40
End: 2020-11-19
Payer: COMMERCIAL

## 2020-11-19 DIAGNOSIS — L81.4 SOLAR LENTIGO: Primary | ICD-10-CM

## 2020-11-19 DIAGNOSIS — L82.1 SEBORRHEIC KERATOSIS: ICD-10-CM

## 2020-11-19 DIAGNOSIS — D22.9 MULTIPLE BENIGN NEVI: ICD-10-CM

## 2020-11-19 PROCEDURE — 99202 OFFICE O/P NEW SF 15 MIN: CPT | Performed by: DERMATOLOGY

## 2020-11-19 ASSESSMENT — PAIN SCALES - GENERAL: PAINLEVEL: NO PAIN (0)

## 2020-11-19 NOTE — NURSING NOTE
"Dermatology Rooming Note    Belle Velez's goals for this visit include:   Chief Complaint   Patient presents with     Derm Problem     Belle is here for a skin check, states she has new \"bumps\" on her face.        Edel Menon LPN    "

## 2020-11-19 NOTE — PROGRESS NOTES
"[facility] Dermatology Note      Dermatology Problem List:  1. Benign nevi.     CC:   Chief Complaint   Patient presents with     Derm Problem     Belle is here for a skin check, states she has new \"bumps\" on her face.        Encounter Date: 2020    History of Present Illness:  Ms. Belle Velez is a 39 year old female who presents as a referral from Dr. Russo for skin check. Today, she reports a few concerning lesions including:    (1) Multiple flesh-colored bump on the right side of the face. Lesions are asymptomatic. No pain, tenderness, or bleeding. Have been present for a few years and stable in size.  (2) Slightly larger mole on the right shoulder. She states this has been present for many years, but slightly increased in size.     The patient otherwise denies any new or concerning lesions. No bleeding, painful, pruritic, or changing lesions. They report no personal history of skin cancer. There is a family history of skin cancer in her maternal aunt (melanoma). No history of immunosuppression. There is a remote history of indoor tanning in her 20s (3-4 lifetime sessions). They do use sunscreen and protective clothing when outdoors for sun protection. Typically burns when outdoors. No occupational exposure to ultraviolet light or other forms of radiation. Patient is an . Health otherwise stable. No other skin concerns.        Past Medical History:   Patient Active Problem List   Diagnosis     History of anxiety     Inverted nipple      (normal spontaneous vaginal delivery)     Neck pain     Past Medical History:   Diagnosis Date     Anxiety      NO ACTIVE PROBLEMS      Skin lesion      Past Surgical History:   Procedure Laterality Date     NO HISTORY OF SURGERY         Social History:  Patient reports that she has never smoked. She has never used smokeless tobacco. She reports current alcohol use. She reports that she does not use drugs.    Family " History:  Family History   Problem Relation Age of Onset     Other Cancer Maternal Grandmother         Gall Bladder     Substance Abuse Maternal Grandfather         Alcoholic     Diabetes Paternal Grandfather         Type II     Skin Cancer No family hx of      Melanoma No family hx of        Medications:  Current Outpatient Medications   Medication Sig Dispense Refill     levonorgestrel (MIRENA) 20 MCG/24HR IUD 1 each (20 mcg) by Intrauterine route once       No Known Allergies      Review of Systems:  -As per HPI  -Constitutional: Otherwise feeling well today, in usual state of health.  -Skin: As above in HPI. No additional skin concerns.    Physical exam:  Vitals: There were no vitals taken for this visit.  GEN: This is a well developed, well-nourished female in no acute distress, in a pleasant mood.    SKIN: Full skin, which includes the head/face, both arms, chest, back, abdomen,both legs, genitalia and/or groin buttocks, digits and/or nails, was examined.  -Alexis skin type: I/II  -Multiple (50-75) brown macules and papules on face, trunk, and extremities with normal symmetric reticular pigment networks, few with central globules, and no significant atypia.  -Few brown, wax, stuck-on appearing thin light brown papules on legs.   -Cluster of 2 firm, skin-colored 1 mm papules on the right cheek.   -Brown macules on sun exposed areas.   -No other lesions of concern on areas examined.     Labs:  None.     Impression/Plan:    1. Flesh, colored 1 mm papules on right cheek x 3. Ddx including fibrous papule, early SKs, benign adnexal neoplasms such as fibrofolliculoma.  Explained to patient benign nature of all these lesions. No treatment is necessary at this time unless the lesion changes or becomes symptomatic and/or patient were to develop multiple new lesions.     2. Benign lesions: Multiple benign nevi, solar lentigos, seborrheic keratoses. Explained to patient benign nature of lesion. No treatment is  necessary at this time unless the lesion changes or becomes symptomatic.     - ABCDs of melanoma were discussed and self skin checks were advised.  - Sun precaution was advised including the use of sun screens of SPF 30 or higher, sun protective clothing, and avoidance of tanning beds.  - Given history of photosensitivity, indoor tanning, and number of nevi recommended FBSE in 2 years.     CC Concepcion Russo MD  [address] [place] [address] on close of this encounter.    Follow-up in 2 years, earlier for new or changing lesions.     Staff Involved:  Staff Only    Christopher Ortega MD  Pronouns: he/him/his    Department of Dermatology  [facility]: Phone: [phone], Fax:[phone]  [facility]: Phone: [phone] Fax: [phone]

## 2020-11-19 NOTE — LETTER
"2020       RE: Belle Velez  [address]  [facility] [address]     Dear Colleague,    Thank you for referring your patient, Belle Velez, to the [facility]ERMATOLOG[facility]INIC [place] at [facility]. Please see a copy of my visit note below.    [facility] Dermatology Note      Dermatology Problem List:  1. Benign nevi.     CC:   Chief Complaint   Patient presents with     Derm Problem     Belle is here for a skin check, states she has new \"bumps\" on her face.        Encounter Date: 2020    History of Present Illness:  Ms. Belle Velez is a 39 year old female who presents as a referral from Dr. Russo for skin check. Today, she reports a few concerning lesions including:    (1) Multiple flesh-colored bump on the right side of the face. Lesions are asymptomatic. No pain, tenderness, or bleeding. Have been present for a few years and stable in size.  (2) Slightly larger mole on the right shoulder. She states this has been present for many years, but slightly increased in size.     The patient otherwise denies any new or concerning lesions. No bleeding, painful, pruritic, or changing lesions. They report no personal history of skin cancer. There is a family history of skin cancer in her maternal aunt (melanoma). No history of immunosuppression. There is a remote history of indoor tanning in her 20s (3-4 lifetime sessions). They do use sunscreen and protective clothing when outdoors for sun protection. Typically burns when outdoors. No occupational exposure to ultraviolet light or other forms of radiation. Patient is an . Health otherwise stable. No other skin concerns.        Past Medical History:   Patient Active Problem List   Diagnosis     History of anxiety     Inverted nipple      (normal spontaneous vaginal delivery)     Neck pain     Past Medical History:   Diagnosis Date     Anxiety      " NO ACTIVE PROBLEMS      Skin lesion      Past Surgical History:   Procedure Laterality Date     NO HISTORY OF SURGERY         Social History:  Patient reports that she has never smoked. She has never used smokeless tobacco. She reports current alcohol use. She reports that she does not use drugs.    Family History:  Family History   Problem Relation Age of Onset     Other Cancer Maternal Grandmother         Gall Bladder     Substance Abuse Maternal Grandfather         Alcoholic     Diabetes Paternal Grandfather         Type II     Skin Cancer No family hx of      Melanoma No family hx of        Medications:  Current Outpatient Medications   Medication Sig Dispense Refill     levonorgestrel (MIRENA) 20 MCG/24HR IUD 1 each (20 mcg) by Intrauterine route once       No Known Allergies      Review of Systems:  -As per HPI  -Constitutional: Otherwise feeling well today, in usual state of health.  -Skin: As above in HPI. No additional skin concerns.    Physical exam:  Vitals: There were no vitals taken for this visit.  GEN: This is a well developed, well-nourished female in no acute distress, in a pleasant mood.    SKIN: Full skin, which includes the head/face, both arms, chest, back, abdomen,both legs, genitalia and/or groin buttocks, digits and/or nails, was examined.  -Alexis skin type: I/II  -Multiple (50-75) brown macules and papules on face, trunk, and extremities with normal symmetric reticular pigment networks, few with central globules, and no significant atypia.  -Few brown, wax, stuck-on appearing thin light brown papules on legs.   -Cluster of 2 firm, skin-colored 1 mm papules on the right cheek.   -Brown macules on sun exposed areas.   -No other lesions of concern on areas examined.     Labs:  None.     Impression/Plan:    1. Flesh, colored 1 mm papules on right cheek x 3. Ddx including fibrous papule, early SKs, benign adnexal neoplasms such as fibrofolliculoma.  Explained to patient benign nature of all  these lesions. No treatment is necessary at this time unless the lesion changes or becomes symptomatic and/or patient were to develop multiple new lesions.     2. Benign lesions: Multiple benign nevi, solar lentigos, seborrheic keratoses. Explained to patient benign nature of lesion. No treatment is necessary at this time unless the lesion changes or becomes symptomatic.     - ABCDs of melanoma were discussed and self skin checks were advised.  - Sun precaution was advised including the use of sun screens of SPF 30 or higher, sun protective clothing, and avoidance of tanning beds.  - Given history of photosensitivity, indoor tanning, and number of nevi recommended FBSE in 2 years.     CC Concepcion Russo MD  [address] [place] [address] on close of this encounter.    Follow-up in 2 years, earlier for new or changing lesions.     Staff Involved:  Staff Only    Christopher Ortega MD  Pronouns: he/him/his    Department of Dermatology  [facility]: Phone: [phone], Fax:[phone]  [facility]: Phone: [phone] Fax: [phone]

## 2020-12-07 DIAGNOSIS — Z86.59 HISTORY OF ANXIETY: Primary | ICD-10-CM

## 2020-12-07 RX ORDER — LORAZEPAM 0.5 MG/1
0.5 TABLET ORAL EVERY 6 HOURS PRN
Qty: 1 TABLET | Refills: 0 | Status: SHIPPED | OUTPATIENT
Start: 2020-12-07 | End: 2022-09-23

## 2020-12-21 ENCOUNTER — OFFICE VISIT (OUTPATIENT)
Dept: NEUROLOGY | Facility: CLINIC | Age: 40
End: 2020-12-21
Attending: INTERNAL MEDICINE
Payer: COMMERCIAL

## 2020-12-21 VITALS
DIASTOLIC BLOOD PRESSURE: 73 MMHG | SYSTOLIC BLOOD PRESSURE: 124 MMHG | OXYGEN SATURATION: 98 % | HEART RATE: 73 BPM | RESPIRATION RATE: 16 BRPM

## 2020-12-21 DIAGNOSIS — R20.0 NUMBNESS AND TINGLING OF BOTH FEET: Primary | ICD-10-CM

## 2020-12-21 DIAGNOSIS — R20.2 NUMBNESS AND TINGLING OF BOTH FEET: Primary | ICD-10-CM

## 2020-12-21 PROCEDURE — 99203 OFFICE O/P NEW LOW 30 MIN: CPT | Performed by: PSYCHIATRY & NEUROLOGY

## 2020-12-21 ASSESSMENT — ENCOUNTER SYMPTOMS
SLEEP DISTURBANCES DUE TO BREATHING: 0
HYPERTENSION: 0
PALPITATIONS: 0
LIGHT-HEADEDNESS: 0
LEG PAIN: 0
EXERCISE INTOLERANCE: 0
ORTHOPNEA: 0
SYNCOPE: 0
HYPOTENSION: 0

## 2020-12-21 NOTE — PROGRESS NOTES
Service Date: 12/21/2020      HISTORY OF PRESENT ILLNESS:  Belle Velez is a 40-year-old female referred by Dr. Russo for evaluation of intermittent foot numbness.      She has appreciated this for 10 years or more.  She mentioned the symptoms to Dr. Russo at a recent visit where she is being evaluated for varicose veins.  Dr. Russo referred her to Neurology for further evaluation.      The foot numbness is intermittent and affects the right foot more than the left.  It only occurs during exercise but the type of exercise can be variable.  For example, she has noted it running, cycling, working out on a rowing machine or just walking quickly.  She indicates the numbness tends to start on the outside of the foot and then spread across the toes and then at times the ball of the foot.  It generally clears after she stops exercising, although it may take up to 30 minutes for it to fully resolve.  This is not painful.  She has never had the sensory symptoms at rest.  It has never spread beyond the toes and the ball of the foot.  There has been no loss of sensation.  She denies any weakness.  She denies any upper extremity symptoms.  She has had no trouble with bowel or bladder control.  The symptoms are not provoked by strain.      She does have some neck pain that she describes as a tension.  She denies a Lhermitte phenomenon.      She denies any low back pain or radicular pain radiating into her legs, but occasionally, she will get some pain around the right gluteal region.      PAST MEDICAL HISTORY:  Otherwise unremarkable.      CURRENT MEDICATIONS:  The only medication she is utilizing is a Mirena IUD.      ALLERGIES:  She has no medical allergies.      FAMILY HISTORY:  No family history of neurologic disease.      SOCIAL HISTORY:  She does not work outside the home.  She does not smoke.  She has 2-4 glasses of wine a week.      PHYSICAL EXAMINATION:  Examination reveals the patient's heart rate is 73.   Blood pressure 124/73.      Straight leg raising is negative.  She has a very modest lumbar lordosis, but otherwise, examination of the back is unremarkable.  She has good dorsalis pedis pulses bilaterally.      Cranial nerves II-XII are intact.  Motor examination reveals normal strength including the foot intrinsic muscles.  She is able to heel and toe walk without difficulty.  Sensory examination is intact to touch, pin, cold, position and vibration.  Cerebellar function is normal.  Gait is normal.  Reflexes are 2+.  Plantar responses are flexor.      IMPRESSION:     1.  Intermittent foot numbness related to various exercises.   2.  Normal examination.      PLAN:  As I explained to the patient, I cannot tell her precisely what is causing the foot numbness.  I do wonder about a possible radicular etiology, but her examination is negative in that regard.      I discussed whether or not to pursue additional testing.  She tells me she has been experiencing this for over 10 years and it is not changing and she is comfortable just monitoring what transpires.  Therefore, I am not going to do any additional testing.      I did tell her if the numbness comes on and does not resolve, spreads, she develops weakness, bladder issues or pain that she should contact me and I will see her back then.      cc:   Concepcion Russo MD   [facility]   [address]   [place]  [address]         MALKA TRUONG MD             D: 2020   T: 2020   MT: jeannette      Name:     JESSICA BUSTILLO   MRN:      [id number]        Account:      [id number]   :      1980           Service Date: 2020      Document: [id number]

## 2020-12-21 NOTE — LETTER
12/21/2020       RE: Belle Velez  [address]  [facility] [address]     Dear Colleague,    Thank you for referring your patient, Belle Velez, to the [facility] NEUROLOGY CL[facility] [place] at [facility]. Please see a copy of my visit note below.    Service Date: 12/21/2020      HISTORY OF PRESENT ILLNESS:  Belle Velez is a 40-year-old female referred by Dr. Russo for evaluation of intermittent foot numbness.      She has appreciated this for 10 years or more.  She mentioned the symptoms to Dr. Russo at a recent visit where she is being evaluated for varicose veins.  Dr. Russo referred her to Neurology for further evaluation.      The foot numbness is intermittent and affects the right foot more than the left.  It only occurs during exercise but the type of exercise can be variable.  For example, she has noted it running, cycling, working out on a rowing machine or just walking quickly.  She indicates the numbness tends to start on the outside of the foot and then spread across the toes and then at times the ball of the foot.  It generally clears after she stops exercising, although it may take up to 30 minutes for it to fully resolve.  This is not painful.  She has never had the sensory symptoms at rest.  It has never spread beyond the toes and the ball of the foot.  There has been no loss of sensation.  She denies any weakness.  She denies any upper extremity symptoms.  She has had no trouble with bowel or bladder control.  The symptoms are not provoked by strain.      She does have some neck pain that she describes as a tension.  She denies a Lhermitte phenomenon.      She denies any low back pain or radicular pain radiating into her legs, but occasionally, she will get some pain around the right gluteal region.      PAST MEDICAL HISTORY:  Otherwise unremarkable.      CURRENT MEDICATIONS:  The only medication she is utilizing is a  Mirena IUD.      ALLERGIES:  She has no medical allergies.      FAMILY HISTORY:  No family history of neurologic disease.      SOCIAL HISTORY:  She does not work outside the home.  She does not smoke.  She has 2-4 glasses of wine a week.      PHYSICAL EXAMINATION:  Examination reveals the patient's heart rate is 73.  Blood pressure 124/73.      Straight leg raising is negative.  She has a very modest lumbar lordosis, but otherwise, examination of the back is unremarkable.  She has good dorsalis pedis pulses bilaterally.      Cranial nerves II-XII are intact.  Motor examination reveals normal strength including the foot intrinsic muscles.  She is able to heel and toe walk without difficulty.  Sensory examination is intact to touch, pin, cold, position and vibration.  Cerebellar function is normal.  Gait is normal.  Reflexes are 2+.  Plantar responses are flexor.      IMPRESSION:     1.  Intermittent foot numbness related to various exercises.   2.  Normal examination.      PLAN:  As I explained to the patient, I cannot tell her precisely what is causing the foot numbness.  I do wonder about a possible radicular etiology, but her examination is negative in that regard.      I discussed whether or not to pursue additional testing.  She tells me she has been experiencing this for over 10 years and it is not changing and she is comfortable just monitoring what transpires.  Therefore, I am not going to do any additional testing.      I did tell her if the numbness comes on and does not resolve, spreads, she develops weakness, bladder issues or pain that she should contact me and I will see her back then.      MALKA TRUONG MD       cc:   Concepcion Russo MD   [facility]   [address]   [place]  [address]     D: 2020   T: 2020   MT: jeannette      Name:     JESSICA BUSTILLO   MRN:      [id number]        Account:      [id number]   :      1980           Service Date: 2020       Document: [id number]

## 2020-12-31 ENCOUNTER — ANCILLARY PROCEDURE (OUTPATIENT)
Dept: MRI IMAGING | Facility: CLINIC | Age: 40
End: 2020-12-31
Attending: SURGERY
Payer: COMMERCIAL

## 2020-12-31 PROCEDURE — 74185 MRA ABD W OR W/O CNTRST: CPT | Mod: GC | Performed by: RADIOLOGY

## 2020-12-31 PROCEDURE — A9585 GADOBUTROL INJECTION: HCPCS | Performed by: RADIOLOGY

## 2020-12-31 RX ORDER — GADOBUTROL 604.72 MG/ML
15 INJECTION INTRAVENOUS ONCE
Status: COMPLETED | OUTPATIENT
Start: 2020-12-31 | End: 2020-12-31

## 2020-12-31 RX ADMIN — GADOBUTROL 13 ML: 604.72 INJECTION INTRAVENOUS at 08:01

## 2020-12-31 NOTE — DISCHARGE INSTRUCTIONS
MRI Contrast Discharge Instructions    The IV contrast you received today will pass out of your body in your  urine. This will happen in the next 24 hours. You will not feel this process.  Your urine will not change color.    Drink at least 4 extra glasses of water or juice today (unless your doctor  has restricted your fluids). This reduces the stress on your kidneys.  You may take your regular medicines.    If you are on dialysis: It is best to have dialysis today.    If you have a reaction: Most reactions happen right away. If you have  any new symptoms after leaving the hospital (such as hives or swelling),  call your hospital at the correct number below. Or call your family doctor.  If you have breathing distress or wheezing, call 911.    Special instructions: ***    I have read and understand the above information.    Signature:______________________________________ Date:___________    Staff:__________________________________________ Date:___________     Time:__________    [place] [facility] Departments:    ___[place]: [phone]  ___[facility]: [phone]  ___[place]: [phone] ___[facility]: [phone]  ___[facility]: [phone]  ___[facility]: [phone]  ___[phone]  ___[facility]: [phone]  ___[phone]

## 2021-01-05 ENCOUNTER — TELEPHONE (OUTPATIENT)
Dept: VASCULAR SURGERY | Facility: CLINIC | Age: 41
End: 2021-01-05

## 2021-01-05 ENCOUNTER — VIRTUAL VISIT (OUTPATIENT)
Dept: VASCULAR SURGERY | Facility: CLINIC | Age: 41
End: 2021-01-05
Payer: COMMERCIAL

## 2021-01-05 DIAGNOSIS — I83.812 VARICOSE VEINS OF LEFT LOWER EXTREMITY WITH PAIN: Primary | ICD-10-CM

## 2021-01-05 PROCEDURE — 99215 OFFICE O/P EST HI 40 MIN: CPT | Mod: 95 | Performed by: SURGERY

## 2021-01-05 ASSESSMENT — PAIN SCALES - GENERAL: PAINLEVEL: NO PAIN (0)

## 2021-01-05 NOTE — TELEPHONE ENCOUNTER
LM for patient regarding 9:00 video visit with Dr. Hart.    Will try back in 5 mins.    Call back number was provided.

## 2021-01-05 NOTE — NURSING NOTE
Vascular Rooming Note     Belle Velez's goals for this visit include:   Chief Complaint   Patient presents with     VENKATA Odonnell, is participating in a virtual visit today for a follow up venous incompetency, no change in condition, no concerns at this time, as reported by patient.     Phylicia Samano LPN

## 2021-01-05 NOTE — PROGRESS NOTES
Belle Velez is a 40 year old female who is being evaluated via a billable video visit.      How would you like to obtain your AVS? MyChart  If the video visit is dropped, the invitation should be resent by: Other e-mail: my chart connect  Will anyone else be joining your video visit? No      Video Start Time: 853AM     Assessment & Plan   Mrs. Velez is a 39yo F with left leg swelling with May Thurner physiology but no history of thrombosis.     - At this time, risks of stenting the left iliac vein are much higher over time with her age than avoiding stenting. The lifetime risk of thrombosis of the stent would be fairly high and much more difficult to manage. She had no history of thrombosis and therefore no indication for stenting at this time. No indication for vein ablation either at this time as she has no real venous incompetence on study.   - Recommend continued compression therapy  - Recommend starting ASA 81mg daily for DVT prophylaxis  - Asked her to go to the ER if she develops signs or symptoms of acute DVT (sudden onset swelling, pain, redness).  - Discussed with her the symptoms of venous claudication as well as venous insufficiency like worsening varicosities and swelling not controlled with compression stockings, pain and pressure with activity. She will let me know if any of this happens, and we can re-evaluate if she has developed venous incompetency. Otherwise, follow up as needed with me.   - I answered all of her questions regarding possible management, lifestyle, and physiology of her disease.     Diagnoses: Left leg swelling,  Left leg varicose veins with pain    Review of the result(s) of each unique test - MRV reviewed- patient has evidence of May Thurner physiology with left common iliac vein compression  Independent interpretation of a test performed by another physician/other qualified health care professional (not separately reported) - See above. May thurner physiology with  left common iliac vein compression.     Discussion of management or test interpretation with external physician/other qualified healthcare professional/appropriate source - Will relay evaluation to primary care physician today through epic with routing of my note and explanation.       45 minutes spent on the date of the encounter doing chart review, history and exam, documentation and further activities as noted above           MEDICATIONS:        - Start taking Aspirin 81mg daily  SELF MONITORING:       - Monitor for changes in left leg swelling, acute pain, worsening in varicosities, symptoms of venous claudication with pressure and pain with activity.     No follow-ups on file. (follow up as needed)    Clary Hart    [facility] [place]    Subjective     Belle Velez is a 40 year old female who presents to clinic today for the following health issues: left leg swelling, may thurner physiology    HPI     Mrs. Velez is unchanged since her last visit. She continues to wear her compression stockings which mostly controls the swelling in her left leg. Without the compression stockings, the swelling is mostly in her calf. She has no pain with activity.     Review of Systems   Negative other than mentioned in HPI      Objective    Vitals - Patient Reported  Pain Score: No Pain (0)        Physical Exam   GENERAL: Healthy, alert and no distress  EYES: Eyes grossly normal to inspection.  No discharge or erythema, or obvious scleral/conjunctival abnormalities.  RESP: No audible wheeze, cough, or visible cyanosis.  No visible retractions or increased work of breathing.    SKIN: Visible skin clear. No significant rash, abnormal pigmentation or lesions.  NEURO: Cranial nerves grossly intact.  Mentation and speech appropriate for age.  PSYCH: Mentation appears normal, affect normal/bright, judgement and insight intact, normal speech and appearance well-groomed.    Mrv Abdomen Pelvis Wo & W  Contrast    Result Date: 12/31/2020  MRV of the abdomen 12/31/2020 Indication: Chronic venous insufficiency; Varicose veins of left lower extremity with pain. Technique: Multiple acquisitions were obtained after contrast media injection in both arterial and venous phases. Source images were reviewed as well as 3D and multi-planar reconstructions. Findings: Vessels: There is compression of the left common iliac vein by the right common iliac artery (series 82 image 59). Adjacent signal void in the central aspect of the inferior IVC is favored to represent flow artifact. Portal and hepatic veins are not well evaluated due to motion artifact. The aorta and iliac arteries are of normal size and caliber. The celiac axis is patent. The hepatic artery and splenic artery are patent. SMA and JAI are patent. The renal arteries are patent, as are the renal veins. Abdomen/pelvis: No acute abnormality of the liver, kidneys, adrenal glands, pancreas, spleen, or bowel. No free fluid in the abdomen/pelvis. No pathologically enlarged lymph nodes. Corpus luteum in the right ovary.     Impression: May Thurner anatomy. Remaining abdomen and pelvis are unremarkable. I have personally reviewed the examination and initial interpretation and I agree with the findings. KARL GARDNER MD    Video-Visit Details    Type of service:  Video Visit    Video End Time:922AM    Originating Location (pt. Location): Home    Distant Location (provider location):  [facility] [place]     Platform used for Video Visit: [employer]     Abnormal Imaging

## 2021-01-05 NOTE — LETTER
1/5/2021       RE: Belle Velez  [address]  [facility] [address]     Dear Colleague,    Thank you for referring your patient, Belle Velez, to the [facility] [place] at [facility]. Please see a copy of my visit note below.    Belle Velez is a 40 year old female who is being evaluated via a billable video visit.      How would you like to obtain your AVS? MyChart  If the video visit is dropped, the invitation should be resent by: Other e-mail: my chart connect  Will anyone else be joining your video visit? No      Video Start Time: 853AM     Assessment & Plan   Mrs. Velez is a 41yo F with left leg swelling with May Thurner physiology but no history of thrombosis.     - At this time, risks of stenting the left iliac vein are much higher over time with her age than avoiding stenting. The lifetime risk of thrombosis of the stent would be fairly high and much more difficult to manage. She had no history of thrombosis and therefore no indication for stenting at this time. No indication for vein ablation either at this time as she has no real venous incompetence on study.   - Recommend continued compression therapy  - Recommend starting ASA 81mg daily for DVT prophylaxis  - Asked her to go to the ER if she develops signs or symptoms of acute DVT (sudden onset swelling, pain, redness).  - Discussed with her the symptoms of venous claudication as well as venous insufficiency like worsening varicosities and swelling not controlled with compression stockings, pain and pressure with activity. She will let me know if any of this happens, and we can re-evaluate if she has developed venous incompetency. Otherwise, follow up as needed with me.   - I answered all of her questions regarding possible management, lifestyle, and physiology of her disease.     Diagnoses: Left leg swelling,  Left leg varicose veins with pain    Review of  the result(s) of each unique test - MRV reviewed- patient has evidence of May Thurner physiology with left common iliac vein compression  Independent interpretation of a test performed by another physician/other qualified health care professional (not separately reported) - See above. May thurner physiology with left common iliac vein compression.     Discussion of management or test interpretation with external physician/other qualified healthcare professional/appropriate source - Will relay evaluation to primary care physician today through [facility] with routing of my note and explanation.       45 minutes spent on the date of the encounter doing chart review, history and exam, documentation and further activities as noted above           MEDICATIONS:        - Start taking Aspirin 81mg daily  SELF MONITORING:       - Monitor for changes in left leg swelling, acute pain, worsening in varicosities, symptoms of venous claudication with pressure and pain with activity.     No follow-ups on file. (follow up as needed)    Clary Hart    [facility] [place]    James     Belle Velez is a 40 year old female who presents to clinic today for the following health issues: left leg swelling, may thurner physiology    HPI     Mrs. Velez is unchanged since her last visit. She continues to wear her compression stockings which mostly controls the swelling in her left leg. Without the compression stockings, the swelling is mostly in her calf. She has no pain with activity.     Review of Systems   Negative other than mentioned in HPI      Objective    Vitals - Patient Reported  Pain Score: No Pain (0)        Physical Exam   GENERAL: Healthy, alert and no distress  EYES: Eyes grossly normal to inspection.  No discharge or erythema, or obvious scleral/conjunctival abnormalities.  RESP: No audible wheeze, cough, or visible cyanosis.  No visible retractions or increased work of breathing.    SKIN:  Visible skin clear. No significant rash, abnormal pigmentation or lesions.  NEURO: Cranial nerves grossly intact.  Mentation and speech appropriate for age.  PSYCH: Mentation appears normal, affect normal/bright, judgement and insight intact, normal speech and appearance well-groomed.    Mrv Abdomen Pelvis Wo & W Contrast    Result Date: 12/31/2020  MRV of the abdomen 12/31/2020 Indication: Chronic venous insufficiency; Varicose veins of left lower extremity with pain. Technique: Multiple acquisitions were obtained after contrast media injection in both arterial and venous phases. Source images were reviewed as well as 3D and multi-planar reconstructions. Findings: Vessels: There is compression of the left common iliac vein by the right common iliac artery (series 82 image 59). Adjacent signal void in the central aspect of the inferior IVC is favored to represent flow artifact. Portal and hepatic veins are not well evaluated due to motion artifact. The aorta and iliac arteries are of normal size and caliber. The celiac axis is patent. The hepatic artery and splenic artery are patent. SMA and JAI are patent. The renal arteries are patent, as are the renal veins. Abdomen/pelvis: No acute abnormality of the liver, kidneys, adrenal glands, pancreas, spleen, or bowel. No free fluid in the abdomen/pelvis. No pathologically enlarged lymph nodes. Corpus luteum in the right ovary.     Impression: May Thurner anatomy. Remaining abdomen and pelvis are unremarkable. I have personally reviewed the examination and initial interpretation and I agree with the findings. KARL GARDNER MD    Video-Visit Details    Type of service:  Video Visit    Video End Time:922AM    Originating Location (pt. Location): Home    Distant Location (provider location):  [facility] [place]     Platform used for Video Visit: [employer]        Again, thank you for allowing me to participate in the care of your patient.       Sincerely,    Clary Hart

## 2021-01-14 PROBLEM — M54.2 NECK PAIN: Status: RESOLVED | Noted: 2020-10-30 | Resolved: 2021-01-14

## 2021-01-14 NOTE — PROGRESS NOTES
Discharge Note    Progress reporting period is from initial evaluation date (please see noted date below) to Nov 6, 2020.  Linked Episodes   Type: Episode: Status: Noted: Resolved: Last update: Updated by:   PHYSICAL THERAPY neck pain Active 10/30/2020  11/6/2020 10:01 AM Jose Elias Somers, DONNA      Comments:       Belle failed to follow up and current status is unknown.  Please see information below for last relevant information on current status.  Patient seen for 2 visits.    SUBJECTIVE  Subjective changes noted by patient:  Patient reports having good and bad days. Did not have to sit much this past week. Trying to get more consistent with HEP. Exercises are going fine. Today, mostly feeling stiffness in bilateral neck and upper traps. Did feel a little bit of left shoulder/arm pain the past week but not as much as last session.  .  Current pain level is 2/10.     Previous pain level was  8/10.   Changes in function:  Yes (See Goal flowsheet attached for changes in current functional level)  Adverse reaction to treatment or activity: None    OBJECTIVE  Changes noted in objective findings: tight pectorals, weak scapular stabilizers; left cervical rotation AROM = min loss     ASSESSMENT/PLAN  Diagnosis: neck pain   Updated problem list and treatment plan:     STG/LTGs have been met or progress has been made towards goals:  Yes, please see goal flowsheet for most current information  Assessment of Progress: current status is unknown.    Last current status:     Self Management Plans:  HEP  I have re-evaluated this patient and find that the nature, scope, duration and intensity of the therapy is appropriate for the medical condition of the patient.  Belle continues to require the following intervention to meet STG and LTG's:  HEP.    Recommendations:  Discharge with current home program.  Patient to follow up with MD as needed.    Please refer to the daily flowsheet for treatment today, total treatment time and time  spent performing 1:1 timed codes.

## 2021-06-08 NOTE — PROGRESS NOTES
"Progress Note     Client Name: Belle Velez                 Date:   5/18/2020                 Service Type: Individual                            Session Start Time:  1:30p             Session End Time:    2:15p                            Session Length:        45 minutes                 Session #:      24                 Attendees:     Client attended alone     Telemedicine Visit: The patient's condition can be safely assessed and treated via synchronous audio and visual telemedicine encounter.       Reason for Telemedicine Visit: Services only offered telehealth     Originating Site (Patient Location): Patient's home     Distant Site (Provider Location): Provider remote setting    Mode of Communication: Video via [employer]     Consent:  The patient/guardian has verbally consented to: the potential risks and benefits of telemedicine (video visit) versus in person care; bill my insurance or make self-payment for services provided; and responsibility for payment of non-covered services.      The patient has been notified of the following:       \"We have found that certain health care needs can be provided without the need for a face to face visit. This service lets us provide the care you need with a phone conversation.       I will have full access to your [place] medical record during this entire phone call. I will be taking notes for your medical record.      Since this is like an office visit, we will bill your insurance company for this service.       There are potential benefits and risks of telephone visits (e.g. limits to patient confidentiality) that differ from in-person visits.?Confidentiality still applies for telephone services, and nobody will record the visit.  It is important to be in a quiet, private space that is free of distractions (including cell phone or other devices) during the visit.??      If during the course of the call I believe a telephone visit is not appropriate, you will " "not be charged for this service.\"     Consent has been obtained for this service by care team member: Yes         Treatment Plan Last Reviewed: 2/21/2020  PHQ-9 / CHRISTIANNE-7: 3 & 7                   DATA  Interactive Complexity: No  Crisis: No                                   Progress Since Last Session (Related to Symptoms / Goals / Homework):              Symptoms: Stable, no change, see Epic for CHRISTIANNE 7 updates     Homework: Completed - wok to reinforce healthy habits, take advantage of having more time, and compartmentalize/contain concerns for others, kerry re: mother's work.                            Episode of Care Goals: Satisfactory progress - ACTION (Actively working towards change); Intervened by reinforcing change plan / affirming steps taken                 Current / Ongoing Stressors and Concerns:              Reported she was not offered new job and experienced extreme stress during interview process; reported feeling relieved now and expressed wanting to \"cool the jets\" re: job search at this time; feels like is \"75% normal\" as she is enjoying working from home, family is connecting with mil, she and  are communicating better, but still learning how to work in the same space; identified as an impatient person, but reported feeling good that she and son really enjoy time outside in the garden together.                  Treatment Objective(s) Addressed in This Session:                      Client will learn 3 distress tolerance skills and 3 limit setting skills at work for work life balance.  Client will practice effective communication with  weekly.                 Intervention:              Motivational Interviewing: open-ended questions, affirmations, reflections and emphasizing personal control and choices, challenge sustain talk, evoke change talk, explore ambivalence, gauge confidence for change   DBT: teach interpersonal effective skills and dialectical thinking/all or nothing thinking, " teach validation skills, reinforce noticing skills, reinforce self care, teach differentiating facts from feelings, reinforce mindfulness skills (effectiveness), teach distress tolerance skills  CBT: identify patterns of anxious thinking and behavior in work and interpersonal relationships, reinforce proactive problem solving skills, teach effective communication skills, teach about accountability and assertiveness, teach about internal locus of control, teach assertiveness skills for conflict resolution, identify and acknowledge process of grief, challenge and replace patterns of behavior at work and in personal life (e.g., decision making, choosing to do work and its impact on personal life)                                 ASSESSMENT: Current Emotional / Mental Status (status of significant symptoms):              Risk status (Self / Other harm or suicidal ideation)              Client denies current fears or concerns for personal safety.              Client denies current or recent suicidal ideation or behaviors.              Client denies current or recent homicidal ideation or behaviors.              Client denies current or recent self injurious behavior or ideation.              Client denies other safety concerns.              Client Client reports there has been no change in risk factors since their last session.                Client Client reports there has been no change in protective factors since their last session.                A safety and risk management plan has not been developed at this time, however client was given the after-hours number / 911 should there be a change in any of these risk factors.              Client reports there has been no change in risk factors since their last session.                Client reports there has been no change in protective factors since their last session.                   Appearance:                            Appropriate              Eye Contact:                            Good              Psychomotor Behavior:          Normal               Attitude:                                   Cooperative               Orientation:                             All              Speech                          Rate / Production:       Normal                           Volume:                       Normal               Mood:                                      Anxious Stressed              Affect:                                      Appropriate               Thought Content:                    Clear               Thought Form:                        Coherent  Goal Directed  Logical               Insight:                                     Good                  Medication Review:              No current psychiatric medications prescribed                 Medication Compliance:              NA                 Changes in Health Issues:              None reported                 Chemical Use Review:              Substance Use: Chemical use reviewed, no active concerns identified                  Tobacco Use: No current tobacco use                  Collateral Reports Completed:              Not Applicable                 Diagnosis:              Generalized Anxiety Disorder        PLAN: (Client Tasks / Therapist Tasks / Other)  Therapist will assign homework of awareness development; provide educational materials on distress tolerance skills; teach how to choose an intensity for asking for help or saying  no . Continue to reinforce boundary setting and interpersonal effectiveness. Continue to reinforce internal locus on control. Teach assertiveness and communication skills for work.      By next appt, client will work to refocus on health and self care, f/t with household chores, and continue to communicate with Gerry.           [facility]                                                            ________________________________________________________________________     Treatment Plan     Client's Name: Belle Velez                YOB: 1980     Date: 2/21/2020     Diagnoses: UPDATE Unspecified Anxiety Disorder; RULE .02 (F41.1) Generalized Anxiety Disorder  Psychosocial & Contextual Factors:  struggles with grief and other MH issues, work stress, close family and friends are out of state  WHODAS: 17     Referral / Collaboration:  Referral to another professional/service is not indicated at this time.     Anticipated number of session or this episode of care: 12        MeasurableTreatment Goal(s) related to diagnosis / functional impairment(s)  Goal 1: Client will better manage anxiety as evidenced by decreased score on CHRISTIANNE 7 from 12 (moderate) to 5 or less (mild).    I will know I've met my goal when I can maintain work satisfaction and have improved communication with my  and his family.       Objective #A (Client Action)                Client will practice effective communication with  weekly.  Status: Continued - Date: 2/21/2020     Intervention(s)  Therapist will assign homework of skill practice; provide educational materials on interpersonal effectiveness; role-play conflict management and communication skills; teach the client how to perform a behavioral chain analysis.     Objective #B  Client will learn 3 distress tolerance skills and 3 limit setting skills at work for work life balance.   Status: Continued - Date: 2/21/2020     Intervention(s)  Therapist will assign homework of awareness development; provide educational materials on distress tolerance skills; teach how to choose an intensity for asking for help or saying  no .        Client has reviewed and agreed to the above plan.        KLAUS Abrams                  2/21/2020
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
## 2021-06-16 ENCOUNTER — OFFICE VISIT (OUTPATIENT)
Dept: FAMILY MEDICINE | Facility: CLINIC | Age: 41
End: 2021-06-16
Payer: COMMERCIAL

## 2021-06-16 VITALS
HEART RATE: 76 BPM | DIASTOLIC BLOOD PRESSURE: 72 MMHG | TEMPERATURE: 97.2 F | OXYGEN SATURATION: 98 % | WEIGHT: 175 LBS | BODY MASS INDEX: 25.84 KG/M2 | RESPIRATION RATE: 16 BRPM | SYSTOLIC BLOOD PRESSURE: 100 MMHG

## 2021-06-16 DIAGNOSIS — H92.01 OTALGIA, RIGHT: Primary | ICD-10-CM

## 2021-06-16 PROCEDURE — 99213 OFFICE O/P EST LOW 20 MIN: CPT | Performed by: FAMILY MEDICINE

## 2021-06-16 ASSESSMENT — ANXIETY QUESTIONNAIRES
4. TROUBLE RELAXING: NOT AT ALL
1. FEELING NERVOUS, ANXIOUS, OR ON EDGE: SEVERAL DAYS
GAD7 TOTAL SCORE: 3
2. NOT BEING ABLE TO STOP OR CONTROL WORRYING: NOT AT ALL
7. FEELING AFRAID AS IF SOMETHING AWFUL MIGHT HAPPEN: NOT AT ALL
3. WORRYING TOO MUCH ABOUT DIFFERENT THINGS: SEVERAL DAYS
GAD7 TOTAL SCORE: 3
5. BEING SO RESTLESS THAT IT IS HARD TO SIT STILL: NOT AT ALL
6. BECOMING EASILY ANNOYED OR IRRITABLE: SEVERAL DAYS
7. FEELING AFRAID AS IF SOMETHING AWFUL MIGHT HAPPEN: NOT AT ALL
GAD7 TOTAL SCORE: 3

## 2021-06-16 ASSESSMENT — PATIENT HEALTH QUESTIONNAIRE - PHQ9
SUM OF ALL RESPONSES TO PHQ QUESTIONS 1-9: 1
SUM OF ALL RESPONSES TO PHQ QUESTIONS 1-9: 1
10. IF YOU CHECKED OFF ANY PROBLEMS, HOW DIFFICULT HAVE THESE PROBLEMS MADE IT FOR YOU TO DO YOUR WORK, TAKE CARE OF THINGS AT HOME, OR GET ALONG WITH OTHER PEOPLE: NOT DIFFICULT AT ALL

## 2021-06-16 NOTE — PROGRESS NOTES
Assessment & Plan     Otalgia, right  We discussed eustachian tube dysfunction versus otitis externa.  She does not have any other symptoms suggestive of referred pain from sinusitis or otitis media.  Could be from headphone use.  We agreed to trial of ibuprofen for next few days and if symptom fails to improve call for prescription for topical steroid plus antibiotic drops.(Cortisporin) but I think it would be unlikely she will need those.  She agreed.                   No follow-ups on file.    Mariusz Crowley MD, MD  [facility]    James Odonnell is a 40 year old who presents for the following health issues     HPI     Concern - Ear pain  Onset: Yesterday   Description: sharp pain in the right ear  Intensity: moderate  Progression of Symptoms:  same and intermittent  Accompanying Signs & Symptoms: swallowing hurts  Previous history of similar problem: none  Precipitating factors:        Worsened by: unone  Alleviating factors:        Improved by: have not tried anything yet  Therapies tried and outcome: None    No ear infection history.   Last week some sensitivity and yesterday more sharp pain.   No drainage.   No hearing issue   No ringing in ears.   No qtip use.   No childhood ear tubes.  No recent flying.   No sinus symptoms. Just on right side.   When head phone in ear painful.          Review of Systems         Objective    There were no vitals taken for this visit.  There is no height or weight on file to calculate BMI.  Physical Exam   Both ear canals and both tympanic membranes are within normal limit.  No tragus tenderness.  No sinus tenderness.  No cervical adenopathy.                  Answers for HPI/ROS submitted by the patient on 6/16/2021   If you checked off any problems, how difficult have these problems made it for you to do your work, take care of things at home, or get along with other people?: Not difficult at all  PHQ9 TOTAL SCORE: 1  CHRISTIANNE 7 TOTAL  SCORE: 3

## 2021-06-17 ASSESSMENT — PATIENT HEALTH QUESTIONNAIRE - PHQ9: SUM OF ALL RESPONSES TO PHQ QUESTIONS 1-9: 1

## 2021-06-17 ASSESSMENT — ANXIETY QUESTIONNAIRES: GAD7 TOTAL SCORE: 3

## 2021-10-09 ENCOUNTER — HEALTH MAINTENANCE LETTER (OUTPATIENT)
Age: 41
End: 2021-10-09

## 2021-11-23 ENCOUNTER — IMMUNIZATION (OUTPATIENT)
Dept: PEDIATRICS | Facility: CLINIC | Age: 41
End: 2021-11-23
Payer: COMMERCIAL

## 2021-11-23 PROCEDURE — 90471 IMMUNIZATION ADMIN: CPT

## 2021-11-23 PROCEDURE — 90686 IIV4 VACC NO PRSV 0.5 ML IM: CPT

## 2021-12-04 ENCOUNTER — HEALTH MAINTENANCE LETTER (OUTPATIENT)
Age: 41
End: 2021-12-04

## 2022-08-10 ENCOUNTER — APPOINTMENT (OUTPATIENT)
Dept: URGENT CARE | Facility: CLINIC | Age: 42
End: 2022-08-10
Payer: COMMERCIAL

## 2022-09-11 ENCOUNTER — HEALTH MAINTENANCE LETTER (OUTPATIENT)
Age: 42
End: 2022-09-11

## 2022-09-22 ENCOUNTER — E-VISIT (OUTPATIENT)
Dept: FAMILY MEDICINE | Facility: CLINIC | Age: 42
End: 2022-09-22
Payer: COMMERCIAL

## 2022-09-22 DIAGNOSIS — M54.50 BILATERAL LOW BACK PAIN WITHOUT SCIATICA, UNSPECIFIED CHRONICITY: Primary | ICD-10-CM

## 2022-09-22 PROCEDURE — 99421 OL DIG E/M SVC 5-10 MIN: CPT | Performed by: FAMILY MEDICINE

## 2022-09-23 RX ORDER — CYCLOBENZAPRINE HCL 10 MG
5-10 TABLET ORAL 3 TIMES DAILY PRN
Qty: 30 TABLET | Refills: 0 | Status: SHIPPED | OUTPATIENT
Start: 2022-09-23 | End: 2024-06-20

## 2022-09-23 RX ORDER — MELOXICAM 15 MG/1
15 TABLET ORAL DAILY
Qty: 15 TABLET | Refills: 0 | Status: SHIPPED | OUTPATIENT
Start: 2022-09-23 | End: 2024-06-20

## 2022-09-23 NOTE — PATIENT INSTRUCTIONS
Caring for Your Back    You are not alone.    Low back pain is very common. Nearly half of all adults will have low back pain in any given year. The good news is that back pain is rarely a danger to your health. Most people can manage their back pain on their own. About half of people start feeling better within two weeks. In 9 out of 10 cases, low back pain goes away or no longer limits daily activity within 6 weeks.     Your outlook is good!     Your symptoms tell us that your low back pain is most likely not a danger to you. Most of the time we will not know the exact cause of low back pain, even if you see a doctor or have an MRI. However, treatment can still work without knowing the cause of the pain. Less than 1 in 100 people need surgery for their back pain.     What can I do about my low back pain?     There are three basic things you can do to ease low back pain and help it go away.     Use heat or cold packs.    Take medicine as directed.    Use positions, movements and exercises.    Using heat or cold packs:    Try cold packs or gentle heat to ease your pain.  Use whichever gives you the most relief. Apply the cold pack or heat for 15 minutes at a time, as often as needed.    Taking medicine:    If taking over-the-counter medicine:    Take ibuprofen (Advil, Motrin) 600 mg three times a day as needed for pain.  OR    Take Aleve (naproxen) 220 to 440 mg two times a day as needed for pain. If your doctor prescribed a muscle relaxant (cyclobenzaprine 10 mg.):    Take   to 1 tablet at bedtime.    Do not drive when taking this medicine. This drug may make you sleepy.     Using positions, movements and exercises:    Research tells us that moving your joints and muscles can help you recover from back pain. Such activity should be simple and gentle. Use the positions below as well as walking to help relieve your pain. Try taking a short walk every 3 to 4 hours during the day. Walk for a few minutes inside your  home or take longer walks outside, on a treadmill or at a mall. Slowly increase the amount of time you walk. Expect discomfort when you begin, but it should lessen as your back starts to heal. When your back feels better, walk daily to keep your back and body healthy.    Finding a position that is comfortable:    When your back pain is new, certain positions will ease your pain. Gently try each of the positions below until you find one that is helpful. Once you find a position of comfort, use it as often as you like when you are resting. You will recover faster if you combine rest with activity.    * Lie on your back with your legs bent. You can do this by placing a pillow under your knees or lie on the floor and rest your lower legs on the seat of a chair.  * Lie on your side with your knees bent and place a pillow between your knees.    Lie on your stomach over pillows.       When should I call my doctor?    Your back pain should improve over the first couple of weeks. As it improves, you should be able to return to your normal activities.  But call your doctor if:      You have a sudden change in your ability to control your bladder or bowels.    You begin to feel tingling in your groin or legs.    The pain spreads down your leg and into your foot.    Your toes, feet or leg muscles begin to feel weak.    You feel generally unwell or sick.    Your pain gets worse.    If you are deaf or hard of hearing, please let us know. We provide many free services including sign language interpreters, oral interpreters, TTYs, telephone amplifiers, note takers and written materials.    For informational purposes only. Not to replace the advice of your health care provider. Copyright   2013 [facility]. All rights reserved. [employer] 101721 - 04/13.  Honorio Odonnell,   I am so sorry you are not feeling so great.  I sent a prescription for cyclobenzaprine muscle relaxer which ou may take 1/2 to 1 pill up to 3 times a day  and I am also sending meloxicam- distant cousin of ibuprofen that is taken once a day for pain control.  It also helps with inflammation.  Do not take ibuprofen, Aleve, Advil, naproxen or aspirin along with it.  You can still take Tylenol if your pain is not under good control.     Without examination back pain is hard diagnosed but at any point if you notice your back pain is going towards your leg, you are having back pain with leaking of urine or stool or fever or your pain is not improving in the next 2 weeks you should be seen in the clinic or urgent care.    Mariusz Crowley MD  [facility]

## 2022-12-07 ENCOUNTER — OFFICE VISIT (OUTPATIENT)
Dept: FAMILY MEDICINE | Facility: CLINIC | Age: 42
End: 2022-12-07
Payer: COMMERCIAL

## 2022-12-07 VITALS
OXYGEN SATURATION: 96 % | DIASTOLIC BLOOD PRESSURE: 77 MMHG | BODY MASS INDEX: 27.94 KG/M2 | RESPIRATION RATE: 12 BRPM | SYSTOLIC BLOOD PRESSURE: 114 MMHG | HEART RATE: 74 BPM | TEMPERATURE: 98.5 F | WEIGHT: 189.19 LBS

## 2022-12-07 DIAGNOSIS — R07.0 THROAT PAIN: Primary | ICD-10-CM

## 2022-12-07 LAB
DEPRECATED S PYO AG THROAT QL EIA: NEGATIVE
GROUP A STREP BY PCR: NOT DETECTED

## 2022-12-07 PROCEDURE — 87651 STREP A DNA AMP PROBE: CPT

## 2022-12-07 PROCEDURE — 99213 OFFICE O/P EST LOW 20 MIN: CPT

## 2022-12-07 NOTE — PROGRESS NOTES
Assessment & Plan     Throat pain  Rapid strep negative  - Streptococcus A Rapid Scr w Reflx to PCR  - Group A Streptococcus PCR Throat Swab      12 minutes spent on the date of the encounter doing review of test results, patient visit and documentation        See Patient Instructions    Return in about 1 week (around 12/14/2022), or if symptoms worsen or fail to improve.    [facility]  [facility]    James Odonnell is a 41 year old presenting for the following health issues:  Urgent Care and Throat Pain (Onset of symptoms 11/28/22, taking ibuprofen, patient states she felt warm but did not take temp on 12/4, neck swelling, painful when swallowing-worst on right side, pain radiates from neck to right ear, negative COVID test on 12/2/22)      HPI     Presents with sore throat for the past 9 days.  Using ibuprofen to help with symptoms.  History of tonsillectomy as a child.  No fever, no headache no GI symptoms no new rashes.  No known exposure to any ill people other than she has been traveling past 2 weeks.  No cough runny nose some postnasal drainage at times.    Review of Systems   Constitutional, HEENT, cardiovascular, pulmonary, gi and gu systems are negative, except as otherwise noted.      Objective    /77   Pulse 74   Temp 98.5  F (36.9  C) (Oral)   Resp 12   Wt 85.8 kg (189 lb 3 oz)   SpO2 96%   BMI 27.94 kg/m    Body mass index is 27.94 kg/m .  Physical Exam   GENERAL: healthy, alert and no distress  EYES: Eyes grossly normal to inspection, PERRL and conjunctivae and sclerae normal  HENT: normal cephalic/atraumatic, ear canals and TM's normal, nose and mouth without ulcers or lesions, oropharynx clear, oral mucous membranes moist and erythema to the posterior oropharynx, tonsils absent  NECK: no adenopathy  RESP: lungs clear to auscultation - no rales, rhonchi or wheezes  CV: regular rates and rhythm, normal S1 S2,  no S3 or S4 and no murmur, click or rub          Results for orders placed or performed in visit on 12/07/22   Streptococcus A Rapid Scr w Reflx to PCR     Status: Normal    Specimen: Throat; Swab   Result Value Ref Range    Group A Strep antigen Negative Negative

## 2022-12-07 NOTE — PATIENT INSTRUCTIONS
Rapid strep is negative, this will reflex to a PCR test which will come back in the next 12 to 18 hours.  If positive you will be notified through MyChart.  Continue to use ibuprofen for symptom management, salt water gargles or Tylenol.

## 2023-01-23 ENCOUNTER — HEALTH MAINTENANCE LETTER (OUTPATIENT)
Age: 43
End: 2023-01-23

## 2023-12-25 ENCOUNTER — E-VISIT (OUTPATIENT)
Dept: URGENT CARE | Facility: CLINIC | Age: 43
End: 2023-12-25
Payer: COMMERCIAL

## 2023-12-25 DIAGNOSIS — H10.33 ACUTE BACTERIAL CONJUNCTIVITIS OF BOTH EYES: Primary | ICD-10-CM

## 2023-12-25 PROCEDURE — 99421 OL DIG E/M SVC 5-10 MIN: CPT | Performed by: EMERGENCY MEDICINE

## 2023-12-26 RX ORDER — POLYMYXIN B SULFATE AND TRIMETHOPRIM 1; 10000 MG/ML; [USP'U]/ML
SOLUTION OPHTHALMIC
Qty: 10 ML | Refills: 0 | Status: SHIPPED | OUTPATIENT
Start: 2023-12-26 | End: 2024-01-02

## 2023-12-26 NOTE — PATIENT INSTRUCTIONS
Pinkeye: Care Instructions  Overview     Pinkeye is redness and swelling of the eye surface and the conjunctiva (the lining of the eyelid and the covering of the white part of the eye). Pinkeye is also called conjunctivitis. Pinkeye is often caused by infection with bacteria or a virus. Dry air, allergies, smoke, and chemicals are other common causes.  Pinkeye often gets better on its own in 7 to 10 days. Antibiotics only help if the pinkeye is caused by bacteria. Pinkeye caused by infection spreads easily. If an allergy or chemical is causing pinkeye, it will not go away unless you can avoid whatever is causing it.  Follow-up care is a key part of your treatment and safety. Be sure to make and go to all appointments, and call your doctor if you are having problems. It's also a good idea to know your test results and keep a list of the medicines you take.  How can you care for yourself at home?    Wash your hands often. Always wash them before and after you treat pinkeye or touch your eyes or face.    Use moist cotton or a clean, wet cloth to remove crust. Wipe from the inside corner of the eye to the outside. Use a clean part of the cloth for each wipe.    Put cold or warm wet cloths on your eye a few times a day if the eye hurts.    Do not wear contact lenses or eye makeup until the pinkeye is gone. Throw away any eye makeup you were using when you got pinkeye. Clean your contacts and storage case. If you wear disposable contacts, use a new pair when your eye has cleared and it is safe to wear contacts again.    If the doctor gave you antibiotic ointment or eyedrops, use them as directed. Use the medicine for as long as instructed, even if your eye starts looking better soon. Keep the bottle tip clean, and do not let it touch the eye area.    To put in eyedrops or ointment:  ? Tilt your head back, and pull your lower eyelid down with one finger.  ? Drop or squirt the medicine inside the lower lid.  ? Close your  "eye for 30 to 60 seconds to let the drops or ointment move around.  ? Do not touch the ointment or dropper tip to your eyelashes or any other surface.    Do not share towels, pillows, or washcloths while you have pinkeye.  When should you call for help?   Call your doctor now or seek immediate medical care if:      You have pain in your eye, not just irritation on the surface.       You have a change in vision or loss of vision.       You have an increase in discharge from the eye.       Your eye has not started to improve or begins to get worse within 48 hours after you start using antibiotics.       Pinkeye lasts longer than 7 days.   Watch closely for changes in your health, and be sure to contact your doctor if you have any problems.  Where can you learn more?  Go to https://www.[employer].KS12/patiented  Enter Y392 in the search box to learn more about \"Pinkeye: Care Instructions.\"  Current as of: June 6, 2023               Content Version: 13.8 2006-2023 [employer].   Care instructions adapted under license by your healthcare professional. If you have questions about a medical condition or this instruction, always ask your healthcare professional. [employer] disclaims any warranty or liability for your use of this information.       Taking Care of Pinkeye at Home (01:30)  Your health professional recommends that you watch this short online health video.  Learn ways to ease the discomfort of pinkeye and keep the infection from spreading.  Purpose:  Describes basic home care for pinkeye to ease discomfort and prevent the spread of the infection.  Goal:  User will learn home treatment for pinkeye.     How to watch the video    Scan the QR code   OR Visit the website    https://link.Fridge.KS12/r/[email or web address]   Current as of: June 6, 2023               Content Version: 13.8 2006-2023 [employer].   Care instructions adapted under license by your healthcare " professional. If you have questions about a medical condition or this instruction, always ask your healthcare professional. [employer] disclaims any warranty or liability for your use of this information.

## 2024-02-24 ENCOUNTER — HEALTH MAINTENANCE LETTER (OUTPATIENT)
Age: 44
End: 2024-02-24

## 2024-06-20 ENCOUNTER — OFFICE VISIT (OUTPATIENT)
Dept: FAMILY MEDICINE | Facility: CLINIC | Age: 44
End: 2024-06-20
Payer: COMMERCIAL

## 2024-06-20 VITALS
WEIGHT: 197.1 LBS | DIASTOLIC BLOOD PRESSURE: 76 MMHG | HEIGHT: 69 IN | TEMPERATURE: 98.3 F | SYSTOLIC BLOOD PRESSURE: 119 MMHG | BODY MASS INDEX: 29.19 KG/M2 | HEART RATE: 76 BPM | RESPIRATION RATE: 16 BRPM | OXYGEN SATURATION: 98 %

## 2024-06-20 DIAGNOSIS — Z00.00 ENCOUNTER FOR MEDICAL EXAMINATION TO ESTABLISH CARE: Primary | ICD-10-CM

## 2024-06-20 DIAGNOSIS — Z13.220 SCREENING FOR LIPID DISORDERS: ICD-10-CM

## 2024-06-20 DIAGNOSIS — Z12.4 SCREENING FOR MALIGNANT NEOPLASM OF CERVIX: ICD-10-CM

## 2024-06-20 DIAGNOSIS — H04.551 OBSTRUCTION OF RIGHT TEAR DUCT: ICD-10-CM

## 2024-06-20 DIAGNOSIS — R59.9 SWELLING OF LYMPH NODE: ICD-10-CM

## 2024-06-20 DIAGNOSIS — Z12.31 ENCOUNTER FOR SCREENING MAMMOGRAM FOR BREAST CANCER: ICD-10-CM

## 2024-06-20 DIAGNOSIS — Z13.1 SCREENING FOR DIABETES MELLITUS: ICD-10-CM

## 2024-06-20 LAB
ALBUMIN SERPL BCG-MCNC: 4.6 G/DL (ref 3.5–5.2)
ALP SERPL-CCNC: 57 U/L (ref 40–150)
ALT SERPL W P-5'-P-CCNC: 39 U/L (ref 0–50)
ANION GAP SERPL CALCULATED.3IONS-SCNC: 7 MMOL/L (ref 7–15)
AST SERPL W P-5'-P-CCNC: 39 U/L (ref 0–45)
BASOPHILS # BLD AUTO: 0 10E3/UL (ref 0–0.2)
BASOPHILS NFR BLD AUTO: 1 %
BILIRUB SERPL-MCNC: 0.9 MG/DL
BUN SERPL-MCNC: 7.8 MG/DL (ref 6–20)
CALCIUM SERPL-MCNC: 9.5 MG/DL (ref 8.6–10)
CHLORIDE SERPL-SCNC: 104 MMOL/L (ref 98–107)
CHOLEST SERPL-MCNC: 168 MG/DL
CREAT SERPL-MCNC: 0.71 MG/DL (ref 0.51–0.95)
DEPRECATED HCO3 PLAS-SCNC: 27 MMOL/L (ref 22–29)
EGFRCR SERPLBLD CKD-EPI 2021: >90 ML/MIN/1.73M2
EOSINOPHIL # BLD AUTO: 0.1 10E3/UL (ref 0–0.7)
EOSINOPHIL NFR BLD AUTO: 2 %
ERYTHROCYTE [DISTWIDTH] IN BLOOD BY AUTOMATED COUNT: 11.7 % (ref 10–15)
FASTING STATUS PATIENT QL REPORTED: NO
FASTING STATUS PATIENT QL REPORTED: NO
GLUCOSE SERPL-MCNC: 95 MG/DL (ref 70–99)
HBA1C MFR BLD: 4.9 % (ref 0–5.6)
HCT VFR BLD AUTO: 39.6 % (ref 35–47)
HCV AB SERPL QL IA: NONREACTIVE
HDLC SERPL-MCNC: 57 MG/DL
HGB BLD-MCNC: 13.4 G/DL (ref 11.7–15.7)
IMM GRANULOCYTES # BLD: 0 10E3/UL
IMM GRANULOCYTES NFR BLD: 0 %
LDLC SERPL CALC-MCNC: 97 MG/DL
LYMPHOCYTES # BLD AUTO: 1.3 10E3/UL (ref 0.8–5.3)
LYMPHOCYTES NFR BLD AUTO: 37 %
MCH RBC QN AUTO: 31.8 PG (ref 26.5–33)
MCHC RBC AUTO-ENTMCNC: 33.8 G/DL (ref 31.5–36.5)
MCV RBC AUTO: 94 FL (ref 78–100)
MONOCYTES # BLD AUTO: 0.4 10E3/UL (ref 0–1.3)
MONOCYTES NFR BLD AUTO: 11 %
NEUTROPHILS # BLD AUTO: 1.8 10E3/UL (ref 1.6–8.3)
NEUTROPHILS NFR BLD AUTO: 50 %
NONHDLC SERPL-MCNC: 111 MG/DL
PLATELET # BLD AUTO: 306 10E3/UL (ref 150–450)
POTASSIUM SERPL-SCNC: 4 MMOL/L (ref 3.4–5.3)
PROT SERPL-MCNC: 7 G/DL (ref 6.4–8.3)
RBC # BLD AUTO: 4.21 10E6/UL (ref 3.8–5.2)
SODIUM SERPL-SCNC: 138 MMOL/L (ref 135–145)
TRIGL SERPL-MCNC: 72 MG/DL
TSH SERPL DL<=0.005 MIU/L-ACNC: 1.06 UIU/ML (ref 0.3–4.2)
WBC # BLD AUTO: 3.6 10E3/UL (ref 4–11)

## 2024-06-20 PROCEDURE — 85025 COMPLETE CBC W/AUTO DIFF WBC: CPT

## 2024-06-20 PROCEDURE — 87624 HPV HI-RISK TYP POOLED RSLT: CPT

## 2024-06-20 PROCEDURE — 86803 HEPATITIS C AB TEST: CPT

## 2024-06-20 PROCEDURE — 80053 COMPREHEN METABOLIC PANEL: CPT

## 2024-06-20 PROCEDURE — 90471 IMMUNIZATION ADMIN: CPT

## 2024-06-20 PROCEDURE — 90746 HEPB VACCINE 3 DOSE ADULT IM: CPT

## 2024-06-20 PROCEDURE — 84443 ASSAY THYROID STIM HORMONE: CPT

## 2024-06-20 PROCEDURE — 99204 OFFICE O/P NEW MOD 45 MIN: CPT | Mod: 25

## 2024-06-20 PROCEDURE — 36415 COLL VENOUS BLD VENIPUNCTURE: CPT

## 2024-06-20 PROCEDURE — 80061 LIPID PANEL: CPT

## 2024-06-20 PROCEDURE — 83036 HEMOGLOBIN GLYCOSYLATED A1C: CPT

## 2024-06-20 RX ORDER — NEOMYCIN SULFATE, POLYMYXIN B SULFATE AND BACITRACIN ZINC 3.5; 10000; 4 MG/G; [USP'U]/G; [USP'U]/G
OINTMENT OPHTHALMIC
COMMUNITY
End: 2024-06-20

## 2024-06-20 NOTE — PROGRESS NOTES
Preceptor Attestation:   Patient seen, evaluated and discussed with the resident. I have verified the content of the note, which accurately reflects my assessment of the patient and the plan of care.   Supervising Physician:  Chandra Bennett MD

## 2024-06-20 NOTE — PROGRESS NOTES
Assessment & Plan     Encounter for medical examination to establish care  Overall healthy individual here to establish care. Due for routine screenings.  - Comprehensive metabolic panel; Future  - Hepatitis C Screen Reflex to HCV RNA Quant and Genotype; Future  - TSH with free T4 reflex; Future  - CBC with platelets differential; Future    Obstruction of right tear duct  Had a visit with optometry, who told her she had a clogged tear duct and should see ophtho for procedure to release it. Referral sent.  - Adult Eye  Referral; Future    Screening for lipid disorders  Routine screening  - Lipid panel reflex to direct LDL Non-fasting    Encounter for screening mammogram for breast cancer  Discussed different society recommendations for screening startinga t 40 versus 45. Low risk, no FH. Shared decision making, patient would like to start screening  - MA Screen Bilateral w/Chai; Future    Screening for malignant neoplasm of cervix  Last pap normal in 2016.  - High risk HPV vaginal self-swab    Screening for diabetes mellitus  Due for routine screening.  - Hemoglobin A1c    Swollen lymph node  ~1cm non-tender soft mobile anterior chain swollen lymph node on left side. Patient states she has noticed it over the past week. No abnormalities observed in the mouth. Will recheck at next visit, obtain CBC today.  - CBC    Follow up prn to discuss menopause symptoms, recheck lymph node, and for comprehensive skin exam.    James Odonnell is a 43 year old, presenting for the following health issues:  Consult (Pt is establishing care + eye referral )    HPI       Has a mole on the back that looks different to her  No FH breast cancer, ovarianc cancer, colon cancer  Has had heart palpitations all her life, ever since she was younger. Notices it more when she was intermittent fasting. No associated SOB, syncope, lightheadedness. Not really heart racing, just maybe one irregular sensation for a second. Possibly  "worsened with caffeine, hard to tell  One prior pregnancy, kid is now 7  Works as an  for the state      Objective    /76   Pulse 76   Temp 98.3  F (36.8  C) (Oral)   Resp 16   Ht 1.753 m (5' 9\")   Wt 89.4 kg (197 lb 1.6 oz)   SpO2 98%   BMI 29.11 kg/m    Body mass index is 29.11 kg/m .  Physical Exam   GENERAL: alert and no distress  EYES: Eyes grossly normal to inspection, PERRL and conjunctivae and sclerae normal  HENT: ear canals and TM's normal, nose and mouth without ulcers or lesions. Enlarged, mobile soft anterior chain lymph node  NECK: no adenopathy, no asymmetry, masses, or scars  RESP: lungs clear to auscultation - no rales, rhonchi or wheezes  CV: regular rate and rhythm, normal S1 S2, no S3 or S4, no murmur, click or rub, no peripheral edema  ABDOMEN: soft, nontender, no hepatosplenomegaly, no masses and bowel sounds normal  MS: no gross musculoskeletal defects noted, no edema  SKIN: no suspicious lesions or rashes and many moles present on the back  NEURO: Normal strength and tone, mentation intact and speech normal  PSYCH: mentation appears normal, affect normal/bright      Signed Electronically by: Ivania Gould MD    "

## 2024-06-20 NOTE — PATIENT INSTRUCTIONS
Patient Education   Here is the plan from today's visit    1. Encounter for medical examination to establish care  - Comprehensive metabolic panel; Future  - Hepatitis C Screen Reflex to HCV RNA Quant and Genotype; Future  - TSH with free T4 reflex; Future  - CBC with platelets differential; Future  - Comprehensive metabolic panel  - Hepatitis C Screen Reflex to HCV RNA Quant and Genotype  - TSH with free T4 reflex  - CBC with platelets differential    2. Obstruction of right tear duct  - Adult Eye  Referral; Future    3. Screening for lipid disorders  - Lipid panel reflex to direct LDL Non-fasting; Future  - Lipid panel reflex to direct LDL Non-fasting    4. Encounter for screening mammogram for breast cancer  - MA Screen Bilateral w/Chai; Future    5. Screening for malignant neoplasm of cervix  - High risk HPV vaginal self-swab    6. Screening for diabetes mellitus  - Hemoglobin A1c; Future  - Hemoglobin A1c        Please call or return to clinic if your symptoms don't go away.    Follow up plan  Return in about 4 weeks (around 7/18/2024).    Thank you for coming to [facility] today.  Lab Testing:  **If you had lab testing today and your results are reassuring or normal they will be mailed to you or sent through [employer] within 7 days.   **If the lab tests need quick action we will call you with the results.  **If you are having labs done on a different day, please call [phone] to schedule at [facility] or [phone] for other [facility] Outpatient Lab locations. Labs do not offer walk-in appointments.  The phone number we will call with results is # [phone] (home) . If this is not the best number please call our clinic and change the number.  Medication Refills:  If you need any refills please call your pharmacy and they will contact us.   If you need to  your refill at a new pharmacy, please contact the new pharmacy directly. The new pharmacy will help you get your  medications transferred faster.   Scheduling:  If you have any concerns about today's visit or wish to schedule another appointment please call our office during normal business hours [phone] (8-5:00 M-F). If you can no longer make a scheduled visit, please cancel via [employer] or call us to cancel.   If a referral was [facility] to [facility] provider and you do not get a call from central scheduling, please refer to directions on your visit summary or call our office during normal business hours for assistance.   If a Mammogram was ordered for you at the Breast Center call [phone] to schedule or change your appointment.  If you had an XRay/CT/Ultrasound/MRI ordered the number is [phone] to schedule or change your radiology appointment.   [facility] has limited ultrasound appointments available on Wednesdays, if you would like your ultrasound at [facility], please call [phone] to schedule.   Medical Concerns:  If you have urgent medical concerns please call [phone] at any time of the day.    Ivania Gould MD

## 2024-06-21 LAB
HPV HR 12 DNA CVX QL NAA+PROBE: NEGATIVE
HPV16 DNA CVX QL NAA+PROBE: NEGATIVE
HPV18 DNA CVX QL NAA+PROBE: NEGATIVE
HUMAN PAPILLOMA VIRUS FINAL DIAGNOSIS: NORMAL

## 2024-08-16 DIAGNOSIS — H04.551 OBSTRUCTION OF RIGHT TEAR DUCT: Primary | ICD-10-CM

## 2024-09-20 ENCOUNTER — IMMUNIZATION (OUTPATIENT)
Dept: FAMILY MEDICINE | Facility: CLINIC | Age: 44
End: 2024-09-20
Payer: COMMERCIAL

## 2024-09-20 PROCEDURE — 91320 SARSCV2 VAC 30MCG TRS-SUC IM: CPT

## 2024-09-20 PROCEDURE — 90480 ADMN SARSCOV2 VAC 1/ONLY CMP: CPT

## 2024-09-20 PROCEDURE — 90471 IMMUNIZATION ADMIN: CPT

## 2024-09-20 PROCEDURE — 90673 RIV3 VACCINE NO PRESERV IM: CPT

## 2024-11-27 ENCOUNTER — TELEPHONE (OUTPATIENT)
Dept: FAMILY MEDICINE | Facility: CLINIC | Age: 44
End: 2024-11-27

## 2024-11-27 ENCOUNTER — OFFICE VISIT (OUTPATIENT)
Dept: FAMILY MEDICINE | Facility: CLINIC | Age: 44
End: 2024-11-27
Payer: COMMERCIAL

## 2024-11-27 VITALS
HEART RATE: 68 BPM | SYSTOLIC BLOOD PRESSURE: 139 MMHG | HEIGHT: 69 IN | TEMPERATURE: 99 F | WEIGHT: 194 LBS | BODY MASS INDEX: 28.73 KG/M2 | RESPIRATION RATE: 20 BRPM | OXYGEN SATURATION: 100 % | DIASTOLIC BLOOD PRESSURE: 79 MMHG

## 2024-11-27 DIAGNOSIS — Z01.818 PREOP GENERAL PHYSICAL EXAM: Primary | ICD-10-CM

## 2024-11-27 DIAGNOSIS — H04.201 EXCESSIVE TEARING, RIGHT: ICD-10-CM

## 2024-11-27 PROCEDURE — 99214 OFFICE O/P EST MOD 30 MIN: CPT | Performed by: NURSE PRACTITIONER

## 2024-11-27 ASSESSMENT — PAIN SCALES - GENERAL: PAINLEVEL_OUTOF10: NO PAIN (0)

## 2024-11-27 NOTE — PROGRESS NOTES
Balloon dacroplasty  Preoperative Evaluation  [address] [address]  Phone: [phone]  Primary Provider: Ivania Gould MD  Pre-op Performing Provider: GREGORIO Harrison CNP  Nov 27, 2024 11/27/2024   Surgical Information   What procedure is being done? Balloon dacroplasty    Facility or Hospital where procedure/surgery will be performed: [facility]    Who is doing the procedure / surgery? Dr. Mara Barnes.    Date of surgery / procedure: December 4, 2024    Time of surgery / procedure: 8 am    Where do you plan to recover after surgery? at home with family        Patient-reported     Fax number for surgical facility: [phone] AND COPY due to short notice patient has surgery 12/04    Assessment & Plan     The proposed surgical procedure is considered LOW risk.    Preop general physical exam  No history of complications from anesthesia. Pt has no active cardiac conditions, CAD, prior MI, CHF,  CKD, or diabetes. Good functional status. Pt is able to walk four blocks or climb two flights of stairs.Patient denies CP, SOB, or palpitations during the last six months. Stable.     Excessive tearing, right  Plan for balloon dacroplasty on 12/4/24.  Okay to proceed with procedure.     - No identified additional risk factors other than previously addressed    Preoperative Medication Instructions  Antiplatelet or Anticoagulation Medication Instructions   - Patient is on no antiplatelet or anticoagulation medications.    Additional Medication Instructions  Do not take any meds on day of procedure. No food 12 hours prior to procedure   - Herbal medications and vitamins: DO NOT TAKE 14 days prior to surgery.    Recommendation  Approval given to proceed with proposed procedure, without further diagnostic evaluation.    James Odonnell is a 43 year old, presenting for the following:  Pre-Op Exam          11/27/2024      2:23 PM   Additional Questions   Roomed by volodymyr   Accompanied by self         11/27/2024   Forms   Any forms needing to be completed Yes          11/27/2024     2:23 PM   Patient Reported Additional Medications   Patient reports taking the following new medications none     HPI related to upcoming procedure: Excessive tearing left eye.        11/27/2024   Pre-Op Questionnaire   Have you ever had a heart attack or stroke? No    Have you ever had surgery on your heart or blood vessels, such as a stent placement, a coronary artery bypass, or surgery on an artery in your head, neck, heart, or legs? No    Do you have chest pain with activity? No    Do you have a history of heart failure? No    Do you currently have a cold, bronchitis or symptoms of other infection? No    Do you have a cough, shortness of breath, or wheezing? No    Do you or anyone in your family have previous history of blood clots? No    Do you or does anyone in your family have a serious bleeding problem such as prolonged bleeding following surgeries or cuts? No    Have you ever had problems with anemia or been told to take iron pills? No    Have you had any abnormal blood loss such as black, tarry or bloody stools, or abnormal vaginal bleeding? No    Have you ever had a blood transfusion? No    Are you willing to have a blood transfusion if it is medically needed before, during, or after your surgery? Yes    Have you or any of your relatives ever had problems with anesthesia? No    Do you have sleep apnea, excessive snoring or daytime drowsiness? No    Do you have any artifical heart valves or other implanted medical devices like a pacemaker, defibrillator, or continuous glucose monitor? No    Do you have artificial joints? No    Are you allergic to latex? No        Patient-reported     Health Care Directive  Patient does not have a Health Care Directive: Discussed advance care planning with patient; however, patient declined at this  "time.    Preoperative Review of    reviewed - no record of controlled substances prescribed.          Patient Active Problem List    Diagnosis Date Noted    History of anxiety 2016     Priority: Medium     \"Performance related\"  No meds        Past Medical History:   Diagnosis Date    Anxiety     NO ACTIVE PROBLEMS      (normal spontaneous vaginal delivery) 2016      16 @ 2336, Dr. Zayas Epidural, RML epis, 2nd degree laceration Baby Boy 8lb 1oz    Skin lesion      Past Surgical History:   Procedure Laterality Date    NO HISTORY OF SURGERY       Current Outpatient Medications   Medication Sig Dispense Refill    levonorgestrel (MIRENA) 20 MCG/24HR IUD 1 each (20 mcg) by Intrauterine route once         No Known Allergies     Social History     Tobacco Use    Smoking status: Never    Smokeless tobacco: Never   Substance Use Topics    Alcohol use: Yes     Comment: 2-4 drinks per week       History   Drug Use No             Review of Systems  Constitutional, HEENT, cardiovascular, pulmonary, GI, , musculoskeletal, neuro, skin, endocrine and psych systems are negative, except as otherwise noted.    Objective    /79 (BP Location: Left arm, Patient Position: Sitting, Cuff Size: Adult Regular)   Pulse 68   Temp 99  F (37.2  C) (Temporal)   Resp 20   Ht 1.753 m (5' 9\")   Wt 88 kg (194 lb)   SpO2 100%   BMI 28.65 kg/m     Estimated body mass index is 28.65 kg/m  as calculated from the following:    Height as of this encounter: 1.753 m (5' 9\").    Weight as of this encounter: 88 kg (194 lb).  Physical Exam  GENERAL: alert and no distress  EYES: Eyes grossly normal to inspection, PERRL and conjunctivae and sclerae normal  HENT: ear canals and TM's normal, nose and mouth without ulcers or lesions  NECK: no adenopathy, no asymmetry, masses, or scars  RESP: lungs clear to auscultation - no rales, rhonchi or wheezes  CV: regular rate and rhythm, normal S1 S2, no S3 or S4, no murmur, " click or rub, no peripheral edema  ABDOMEN: soft, nontender, no hepatosplenomegaly, no masses and bowel sounds normal  MS: no gross musculoskeletal defects noted, no edema  SKIN: no suspicious lesions or rashes  NEURO: Normal strength and tone, mentation intact and speech normal  PSYCH: mentation appears normal, affect normal/bright    Recent Labs   Lab Test 06/20/24  0848   HGB 13.4         POTASSIUM 4.0   CR 0.71   A1C 4.9        Diagnostics  No labs were ordered during this visit.   No EKG required, no history of coronary heart disease, significant arrhythmia, peripheral arterial disease or other structural heart disease.    Revised Cardiac Risk Index (RCRI)  The patient has the following serious cardiovascular risks for perioperative complications:   - No serious cardiac risks = 0 points     RCRI Interpretation: 0 points: Class I (very low risk - 0.4% complication rate)       Signed Electronically by: GREGORIO Harrison CNP  A copy of this evaluation report is provided to the requesting physician.

## 2024-11-27 NOTE — PATIENT INSTRUCTIONS
How to Take Your Medication Before Surgery  Preoperative Medication Instructions   Antiplatelet or Anticoagulation Medication Instructions   - Patient is on no antiplatelet or anticoagulation medications.    Additional Medication Instructions  Do not take any meds on day of procedure. No food 12 hours prior to procedure   - Herbal medications and vitamins: DO NOT TAKE 14 days prior to surgery.       
Never

## 2024-11-27 NOTE — TELEPHONE ENCOUNTER
[facility] phone call message- general phone call:    Reason for call: Patient needs the referral to be updated, she is going to see Dr.Krista YAMILETH Jimenez at the [facility], [address] [place] [id number]. She is requesting the referral be updated as soon as possible as her surgery is next Wednesday 12/04 and have the referral uploaded to the [employer] [place] portal due to them not having a fax machine.    Return call needed:  If there is any questions    OK to leave a message on voice mail? Yes    Primary language: English      needed? No    Call taken on November 27, 2024 at 1:12 PM by Ladonna Hicks

## 2024-11-27 NOTE — TELEPHONE ENCOUNTER
[facility] phone call message - order or referral request for patient:     Order or referral being requested: Referral      Additional Comments: The patient is having eye surgery on 12/4/24 at the [facility] clinic (NPI: [id number]) and the insurance company is requesting an 'insurance referral' be sent to them prior to the surgery date.    The patient did not have the insurance fax information at this time. She will be calling back with the info.    OK to leave a message on voice mail? Yes    Primary language: English      needed? No    Call taken on November 27, 2024 at 11:02 AM by Nadia Linn

## 2024-11-27 NOTE — TELEPHONE ENCOUNTER
CC successfully completed referral through [employer] portal for [facility] [place]. Transaction ID: [id number] for reference.      CC called patient to let them know this has been completed. Advised patient to check in with member services through insurance plan to make sure they have everything. Provided patient with direct call back number if patient needed anything else from [facility].    Ira Larios  Care Coordinator- [facility]   [phone]

## 2025-03-09 ENCOUNTER — HEALTH MAINTENANCE LETTER (OUTPATIENT)
Age: 45
End: 2025-03-09

## 2025-03-10 ENCOUNTER — PATIENT OUTREACH (OUTPATIENT)
Dept: CARE COORDINATION | Facility: CLINIC | Age: 45
End: 2025-03-10
Payer: COMMERCIAL

## 2025-03-10 NOTE — PROGRESS NOTES
AUDIOLOGY REPORT    SUBJECTIVE:  Belle Velez is a 44 year old female who was seen on 3/19/25 in the Audiology Clinic at the [facility] for audiologic evaluation, referred by Tania Torres M.D.     The patient reports constant left tinnitus that began in January 2025.  She has very occasional right tinnitus.  Dr. Torres's notes indicate that she wanted an Audiology evaluation first, and then based on results that the patient may possibly need to see ENT. Belle denies hearing changes, aural fullness, drainage from the ears, ear pain, dizziness, or history of ear surgery.  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      OBJECTIVE:  Abuse Screening:  Do you feel unsafe at home or work/school? No  Do you feel threatened by someone? No  Does anyone try to keep you from having contact with others, or doing things outside of your home? No  Physical signs of abuse present? No     Fall Risk Screen:  1. Have you fallen two or more times in the past year? No  2. Have you fallen and had an injury in the past year? No    Timed Up and Go Score (in seconds): not tested  Is patient a fall risk? No  Referral initiated: No  Fall Risk Assessment Completed by Audiology    Otoscopic exam indicates a small amount of non-occluding cerumen.      Pure Tone Thresholds assessed using conventional audiometry with good reliability from 250-8000 Hz bilaterally using insert earphones and circumaural headphones.      RIGHT: Normal hearing    LEFT: Normal hearing   NOTE: Left ear is 20 dB poorer than right ear at 4000 Hz.      Tympanogram:    RIGHT: normal eardrum mobility    LEFT:   normal eardrum mobility    Reflexes (reported by stimulus ear):  RIGHT: Ipsilateral is present at normal levels  RIGHT: Contralateral is present at normal levels  LEFT:   Ipsilateral is present at normal levels  LEFT:   Contralateral is present at normal levels      Speech  Reception Threshold:    RIGHT: 10 dB HL    LEFT:   10 dB HL  Word Recognition Score:     RIGHT: 100% at 50 dB HL using NU-6 recorded word list.    LEFT:   100% at 50 dB HL using NU-6 recorded word list.      ASSESSMENT:   Tinnitus - constant in left ear (very occasional in right ear).  Normal hearing bilaterally with left worse than right at 4000 Hz.     Today s results were discussed with the patient in detail.     PLAN:    ENT consultation due to primarily left tinnitus and asymmetrical hearing (left worse than right).   Recheck hearing in 1 year to monitor asymmetry or sooner if advised by ENT.   Consistent use of hearing protection.   Follow up with Dr. Torres.      The patient expressed understanding and agreement with this plan.    Kye Ruiz., CCC-A, [facility]  Licensed Audiologist  [place] #[id number]       Tania Torres M.D.

## 2025-03-19 ENCOUNTER — OFFICE VISIT (OUTPATIENT)
Dept: AUDIOLOGY | Facility: CLINIC | Age: 45
End: 2025-03-19
Attending: FAMILY MEDICINE
Payer: COMMERCIAL

## 2025-03-19 DIAGNOSIS — H93.12 TINNITUS, LEFT: ICD-10-CM

## 2025-03-19 NOTE — TELEPHONE ENCOUNTER
REFERRAL INFORMATION:  Referring By:   Referring Clinic:   Reason for Visit/Diagnosis: left tinnitus and asymmetrical hearing (left worse than right).  Audio done 3/19/25.    FUTURE VISIT INFORMATION:  Appointment Date: 3/20/25  Appointment Time: 9 AM   NOTES STATUS DETAILS   OFFICE NOTE from referring provider internal 3/19/25- tobias Ring    EAR     AUDIOLOGY NOTES     AUDIOGRAM, TYMPANOGRAM, VESTIBULAR/ BALANCE TEST   *graph/ tracing raw data* Internal  3/19/ 25- aduiogram

## 2025-03-20 ENCOUNTER — OFFICE VISIT (OUTPATIENT)
Dept: OTOLARYNGOLOGY | Facility: CLINIC | Age: 45
End: 2025-03-20
Payer: COMMERCIAL

## 2025-03-20 ENCOUNTER — PRE VISIT (OUTPATIENT)
Dept: OTOLARYNGOLOGY | Facility: CLINIC | Age: 45
End: 2025-03-20

## 2025-03-20 VITALS
OXYGEN SATURATION: 97 % | HEIGHT: 69 IN | WEIGHT: 201 LBS | BODY MASS INDEX: 29.77 KG/M2 | SYSTOLIC BLOOD PRESSURE: 118 MMHG | DIASTOLIC BLOOD PRESSURE: 73 MMHG | HEART RATE: 87 BPM

## 2025-03-20 DIAGNOSIS — H90.3 ASYMMETRICAL SENSORINEURAL HEARING LOSS: ICD-10-CM

## 2025-03-20 DIAGNOSIS — H93.12 TINNITUS, LEFT: Primary | ICD-10-CM

## 2025-03-20 ASSESSMENT — PAIN SCALES - GENERAL: PAINLEVEL_OUTOF10: NO PAIN (0)

## 2025-03-20 NOTE — PROGRESS NOTES
"  Otolaryngology Clinic  2025    Chief Complaint:   Left tinnitus and asymmetric hearing loss       History of Present Illness:   Belle Velez is a 44 year old female who presents today for evaluation of left tinnitus and asymmetric hearing loss.  Patient reports noticeable left sided tinnitus starting in 2025.  Most noticeable at night when trying to fall asleep in a quiet environment.  Can increase at times with stress/anxiety.  Will have occasional right sided tinnitus.  Patient denies any noticeable hearing loss or vertigo symptoms.  Denies any facial numbness/weakness. Patient denies any otalgia, otorrhea, history of frequent ear infections, or ear surgeries.  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  Patient does have a history of migraines.  History of bilateral tension headaches.  Possible clenching of the jaw.    Past Medical History:  Past Medical History:   Diagnosis Date    Anxiety     NO ACTIVE PROBLEMS      (normal spontaneous vaginal delivery) 2016      16 @ 233, Dr. Marquez Sampson, RML epis, 2nd degree laceration Baby Boy 8lb 1oz    Skin lesion      Past Surgical History:  Past Surgical History:   Procedure Laterality Date    NO HISTORY OF SURGERY       Medications:  Current Outpatient Medications   Medication Sig Dispense Refill    levonorgestrel (MIRENA) 20 MCG/24HR IUD 1 each (20 mcg) by Intrauterine route once (Patient not taking: Reported on 3/20/2025)         Allergies:  No Known Allergies     Social History:  Social History     Tobacco Use    Smoking status: Never    Smokeless tobacco: Never   Vaping Use    Vaping status: Never Used   Substance Use Topics    Alcohol use: Yes     Comment: 2-4 drinks per week    Drug use: No       ROS: 10 point ROS neg other than the symptoms noted above in the HPI.    Physical Exam:    /73   Pulse 87   Ht 1.753 m (5' 9\")   " Wt 91.2 kg (201 lb)   SpO2 97%   BMI 29.68 kg/m       Constitutional:  The patient was unaccompanied, well-groomed, and in no acute distress.     Skin: Normal:  warm and pink without rash    Neurologic: Alert and oriented x 3.  CN's III-XII within normal limits.  Voice normal.    Psychiatric: The patient's affect was calm, cooperative, and appropriate.     Communication:  Normal; communicates verbally, normal voice quality.    Respiratory: Breathing comfortably without stridor or exertion of accessory muscles.    Ears: Pinnae and tragus non-tender.  EAC's and TM's were clear.     Oral Cavity: No tenderness with palpation of temporomandibular joints.     Audiogram: 3/19/2025 - data independently reviewed  Right ear: Normal hearing   Left ear: Normal hearing with a 10-20 dB asymmetry at 4000 & 6000 Hz with left worse than right   % sy 50 dB bilaterally  Acoustic Reflexes: Present in all conditions  Tympanograms: type A bilaterally     Assessment and Plan:  Patient with left tinnitus and asymmetric sensorineural hearing.  Reviewed audiogram with patient's today that does show 2 frequencies of asymmetry with left worse than right on the recent audiogram.  Suspect that this may be contributing to patient's unilateral tinnitus.  Explained to patient that tinnitus is a brain generated noise but this can be in response to hearing loss.  There are other factors that can contribute to tinnitus including muscle tension.  Patient should monitor for clenching of the jaw which can also contribute to tinnitus symptoms.    Due to asymmetry, discussed obtaining an MRI to rule out a retrocochlear lesion.  Patient has very claustrophobic and would require sedation for this MRI.  Also discussed monitoring with a repeat audiogram in 1 year.  Patient would like to monitor at this time and will return to clinic in 1 year with audiogram.  Sooner for any new, sudden changes in hearing or significant worsening of their left-sided  tinnitus.    Discussed management strategies of tinnitus with patient today.  Recommend tinnitus masking at night when tinnitus can be bothersome.  Patient does report a longstanding history of difficulty sleeping and cyclical insomnia.  Patient may benefit from tinnitus and mindfulness sleep apps that focus on progressive relaxation and use white noise to help calm the brain for sleep.    Patient will use tinnitus management strategies at this time.  If patient feels that tinnitus is not well-controlled with masking and cognitive behavioral approaches, patient to contact clinic for a health psychology referral for further cognitive behavioral strategies for management of tinnitus.    Again, patient return to clinic in 1 year with repeat audiogram.     Ellie Bush DNP, APRN, CNP  Otolaryngology  Head & Neck Surgery  [phone]    30 minutes spent by me on the date of the encounter doing chart review, history and exam, documentation and further activities per the note

## 2025-03-20 NOTE — LETTER
3/20/2025       RE: Belle Velez  [address]  [facility] [address]     Dear Colleague,    Thank you for referring your patient, Belle Velez, to the [facility] [place] [facility]. Please see a copy of my visit note below.      Otolaryngology Clinic  2025    Chief Complaint:   Left tinnitus and asymmetric hearing loss       History of Present Illness:   Belle Velez is a 44 year old female who presents today for evaluation of left tinnitus and asymmetric hearing loss.  Patient reports noticeable left sided tinnitus starting in 2025.  Most noticeable at night when trying to fall asleep in a quiet environment.  Can increase at times with stress/anxiety.  Will have occasional right sided tinnitus.  Patient denies any noticeable hearing loss or vertigo symptoms.  Denies any facial numbness/weakness. Patient denies any otalgia, otorrhea, history of frequent ear infections, or ear surgeries.  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  Patient does have a history of migraines.  History of bilateral tension headaches.  Possible clenching of the jaw.    Past Medical History:  Past Medical History:   Diagnosis Date     Anxiety      NO ACTIVE PROBLEMS       (normal spontaneous vaginal delivery) 2016      16 @ 233, [facility], 2nd degree laceration Baby Boy 8lb 1oz     Skin lesion      Past Surgical History:  Past Surgical History:   Procedure Laterality Date     NO HISTORY OF SURGERY       Medications:  Current Outpatient Medications   Medication Sig Dispense Refill     levonorgestrel (MIRENA) 20 MCG/24HR IUD 1 each (20 mcg) by Intrauterine route once (Patient not taking: Reported on 3/20/2025)         Allergies:  No Known Allergies     Social History:  Social History  "    Tobacco Use     Smoking status: Never     Smokeless tobacco: Never   Vaping Use     Vaping status: Never Used   Substance Use Topics     Alcohol use: Yes     Comment: 2-4 drinks per week     Drug use: No       ROS: 10 point ROS neg other than the symptoms noted above in the HPI.    Physical Exam:    /73   Pulse 87   Ht 1.753 m (5' 9\")   Wt 91.2 kg (201 lb)   SpO2 97%   BMI 29.68 kg/m       Constitutional:  The patient was unaccompanied, well-groomed, and in no acute distress.     Skin: Normal:  warm and pink without rash    Neurologic: Alert and oriented x 3.  CN's III-XII within normal limits.  Voice normal.    Psychiatric: The patient's affect was calm, cooperative, and appropriate.     Communication:  Normal; communicates verbally, normal voice quality.    Respiratory: Breathing comfortably without stridor or exertion of accessory muscles.    Ears: Pinnae and tragus non-tender.  EAC's and TM's were clear.     Oral Cavity: No tenderness with palpation of temporomandibular joints.     Audiogram: 3/19/2025 - data independently reviewed  Right ear: Normal hearing   Left ear: Normal hearing with a 10-20 dB asymmetry at 4000 & 6000 Hz with left worse than right   % sy 50 dB bilaterally  Acoustic Reflexes: Present in all conditions  Tympanograms: type A bilaterally     Assessment and Plan:  Patient with left tinnitus and asymmetric sensorineural hearing.  Reviewed audiogram with patient's today that does show 2 frequencies of asymmetry with left worse than right on the recent audiogram.  Suspect that this may be contributing to patient's unilateral tinnitus.  Explained to patient that tinnitus is a brain generated noise but this can be in response to hearing loss.  There are other factors that can contribute to tinnitus including muscle tension.  Patient should monitor for clenching of the jaw which can also contribute to tinnitus symptoms.    Due to asymmetry, discussed obtaining an MRI to rule out " a retrocochlear lesion.  Patient has very claustrophobic and would require sedation for this MRI.  Also discussed monitoring with a repeat audiogram in 1 year.  Patient would like to monitor at this time and will return to clinic in 1 year with audiogram.  Sooner for any new, sudden changes in hearing or significant worsening of their left-sided tinnitus.    Discussed management strategies of tinnitus with patient today.  Recommend tinnitus masking at night when tinnitus can be bothersome.  Patient does report a longstanding history of difficulty sleeping and cyclical insomnia.  Patient may benefit from tinnitus and mindfulness sleep apps that focus on progressive relaxation and use white noise to help calm the brain for sleep.    Patient will use tinnitus management strategies at this time.  If patient feels that tinnitus is not well-controlled with masking and cognitive behavioral approaches, patient to contact clinic for a health psychology referral for further cognitive behavioral strategies for management of tinnitus.    Again, patient return to clinic in 1 year with repeat audiogram.     Ellie Bush DNP, APRN, CNP  Otolaryngology  Head & Neck Surgery  [phone]    30 minutes spent by me on the date of the encounter doing chart review, history and exam, documentation and further activities per the note      Again, thank you for allowing me to participate in the care of your patient.      Sincerely,    Nadia Bush, NP

## 2025-04-02 ENCOUNTER — ANCILLARY PROCEDURE (OUTPATIENT)
Dept: MAMMOGRAPHY | Facility: CLINIC | Age: 45
End: 2025-04-02
Attending: FAMILY MEDICINE
Payer: COMMERCIAL

## 2025-04-02 DIAGNOSIS — Z12.31 ENCOUNTER FOR SCREENING MAMMOGRAM FOR MALIGNANT NEOPLASM OF BREAST: ICD-10-CM

## 2025-04-02 PROCEDURE — 77063 BREAST TOMOSYNTHESIS BI: CPT | Mod: 26 | Performed by: STUDENT IN AN ORGANIZED HEALTH CARE EDUCATION/TRAINING PROGRAM

## 2025-04-02 PROCEDURE — 77063 BREAST TOMOSYNTHESIS BI: CPT

## 2025-04-02 PROCEDURE — 77067 SCR MAMMO BI INCL CAD: CPT | Mod: 26 | Performed by: STUDENT IN AN ORGANIZED HEALTH CARE EDUCATION/TRAINING PROGRAM

## 2025-04-02 PROCEDURE — 77067 SCR MAMMO BI INCL CAD: CPT

## 2025-04-03 ENCOUNTER — ANCILLARY ORDERS (OUTPATIENT)
Dept: FAMILY MEDICINE | Facility: CLINIC | Age: 45
End: 2025-04-03
Payer: COMMERCIAL

## 2025-04-03 DIAGNOSIS — R92.8 ABNORMAL MAMMOGRAM OF LEFT BREAST: Primary | ICD-10-CM

## 2025-04-08 ENCOUNTER — ANCILLARY PROCEDURE (OUTPATIENT)
Dept: MAMMOGRAPHY | Facility: CLINIC | Age: 45
End: 2025-04-08
Attending: FAMILY MEDICINE
Payer: COMMERCIAL

## 2025-04-08 DIAGNOSIS — N60.09 BREAST CYST, UNSPECIFIED LATERALITY: Primary | ICD-10-CM

## 2025-04-08 DIAGNOSIS — R92.8 ABNORMAL MAMMOGRAM OF LEFT BREAST: ICD-10-CM

## 2025-04-08 PROCEDURE — 76642 ULTRASOUND BREAST LIMITED: CPT | Mod: LT | Performed by: STUDENT IN AN ORGANIZED HEALTH CARE EDUCATION/TRAINING PROGRAM

## 2025-04-08 PROCEDURE — G0279 TOMOSYNTHESIS, MAMMO: HCPCS | Performed by: STUDENT IN AN ORGANIZED HEALTH CARE EDUCATION/TRAINING PROGRAM

## 2025-04-08 PROCEDURE — 77065 DX MAMMO INCL CAD UNI: CPT | Mod: LT | Performed by: STUDENT IN AN ORGANIZED HEALTH CARE EDUCATION/TRAINING PROGRAM

## 2025-06-19 ENCOUNTER — E-VISIT (OUTPATIENT)
Dept: URGENT CARE | Facility: CLINIC | Age: 45
End: 2025-06-19
Payer: COMMERCIAL

## 2025-06-19 DIAGNOSIS — R23.8 BULLAE: Primary | ICD-10-CM

## 2025-06-19 NOTE — PATIENT INSTRUCTIONS
Dear Belle Velez,    We are sorry you are not feeling well. Based on the responses you provided, it is recommended that you be seen in-person in urgent care so we can better evaluate your symptoms. Please click here to find the nearest urgent care location to you.   You will not be charged for this Visit. Thank you for trusting us with your care.    Lynn Valverde PA-C

## 2025-06-24 ENCOUNTER — OFFICE VISIT (OUTPATIENT)
Dept: FAMILY MEDICINE | Facility: CLINIC | Age: 45
End: 2025-06-24
Payer: COMMERCIAL

## 2025-06-24 VITALS
RESPIRATION RATE: 17 BRPM | WEIGHT: 192.8 LBS | TEMPERATURE: 98 F | SYSTOLIC BLOOD PRESSURE: 100 MMHG | BODY MASS INDEX: 28.56 KG/M2 | DIASTOLIC BLOOD PRESSURE: 64 MMHG | OXYGEN SATURATION: 97 % | HEIGHT: 69 IN | HEART RATE: 71 BPM

## 2025-06-24 DIAGNOSIS — G89.29 CHRONIC RIGHT SHOULDER PAIN: ICD-10-CM

## 2025-06-24 DIAGNOSIS — M25.511 CHRONIC RIGHT SHOULDER PAIN: ICD-10-CM

## 2025-06-24 DIAGNOSIS — Z00.00 ANNUAL PHYSICAL EXAM: Primary | ICD-10-CM

## 2025-06-24 DIAGNOSIS — S61.239A PUNCTURE WOUND OF FINGER OF RIGHT HAND, INITIAL ENCOUNTER: ICD-10-CM

## 2025-06-24 RX ORDER — CYCLOBENZAPRINE HCL 5 MG
5 TABLET ORAL 3 TIMES DAILY PRN
Qty: 30 TABLET | Refills: 0 | Status: SHIPPED | OUTPATIENT
Start: 2025-06-24

## 2025-06-24 SDOH — HEALTH STABILITY: PHYSICAL HEALTH: ON AVERAGE, HOW MANY MINUTES DO YOU ENGAGE IN EXERCISE AT THIS LEVEL?: 30 MIN

## 2025-06-24 SDOH — HEALTH STABILITY: PHYSICAL HEALTH: ON AVERAGE, HOW MANY DAYS PER WEEK DO YOU ENGAGE IN MODERATE TO STRENUOUS EXERCISE (LIKE A BRISK WALK)?: 3 DAYS

## 2025-06-24 ASSESSMENT — SOCIAL DETERMINANTS OF HEALTH (SDOH): HOW OFTEN DO YOU GET TOGETHER WITH FRIENDS OR RELATIVES?: MORE THAN THREE TIMES A WEEK

## 2025-06-24 ASSESSMENT — PAIN SCALES - GENERAL: PAINLEVEL_OUTOF10: MODERATE PAIN (5)

## 2025-06-24 NOTE — PROGRESS NOTES
Preceptor Attestation:   Patient seen, evaluated and discussed with the resident. I have verified the content of the note, which accurately reflects my assessment of the patient and the plan of care.   Supervising Physician:  Nikia Jimenez MD

## 2025-07-10 ASSESSMENT — ACTIVITIES OF DAILY LIVING (ADL)
PUTTING_ON_YOUR_PANTS: 1
PLACING_AN_OBJECT_ON_A_HIGH_SHELF: 3
TOUCHING_THE_BACK_OF_YOUR_NECK: 4
WASHING_YOUR_BACK: 4
CARRYING_A_HEAVY_OBJECT_OF_10_POUNDS: 5
REMOVING_SOMETHING_FROM_YOUR_BACK_POCKET: 5
REACHING_FOR_SOMETHING_ON_A_HIGH_SHELF: 4
WHEN_LYING_ON_THE_INVOLVED_SIDE: 5
AT_ITS_WORST?: 5
PUSHING_WITH_THE_INVOLVED_ARM: 4
PUTTING_ON_A_SHIRT_THAT_BUTTONS_DOWN_THE_FRONT: 1
PUTTING_ON_AN_UNDERSHIRT_OR_A_PULLOVER_SWEATER: 4
PLEASE_INDICATE_YOR_PRIMARY_REASON_FOR_REFERRAL_TO_THERAPY:: SHOULDER

## 2025-07-14 ENCOUNTER — THERAPY VISIT (OUTPATIENT)
Dept: PHYSICAL THERAPY | Facility: CLINIC | Age: 45
End: 2025-07-14
Payer: COMMERCIAL

## 2025-07-14 DIAGNOSIS — M25.511 CHRONIC RIGHT SHOULDER PAIN: ICD-10-CM

## 2025-07-14 DIAGNOSIS — G89.29 CHRONIC RIGHT SHOULDER PAIN: ICD-10-CM

## 2025-07-14 PROCEDURE — 97530 THERAPEUTIC ACTIVITIES: CPT | Mod: GP

## 2025-07-14 PROCEDURE — 97161 PT EVAL LOW COMPLEX 20 MIN: CPT | Mod: GP

## 2025-07-14 PROCEDURE — 97110 THERAPEUTIC EXERCISES: CPT | Mod: GP

## 2025-07-14 NOTE — PROGRESS NOTES
"PHYSICAL THERAPY EVALUATION  Type of Visit: Evaluation       Fall Risk Screen:  Have you fallen 2 or more times in the past year?: No  Have you fallen and had an injury in the past year?: No    Subjective   Patient injured R shoulder in Feb. Was using a resistant band bring her arms up and behind back and performing horizontal abduction. During exercises felt tension, like she was stretching it, then had pain 1 or 2 days after the exercises. Generally slow improvement. Being in prolonged positions and sleeping on shoulder \"wrong\" have increased symptoms. Will stop any activity that causes pain. May have slight increase in symptoms for a few days then starts to improve again.     Reports a lot of neck injuries and neck and shoulder tension. Minor [facility] shoulder injuries in the past that resolved after 2 weeks.         Presenting condition or subjective complaint: I injured my shoulder in february using an exercise band. I had a litnof pain at first and it very slowly inproved but would sometimes get worse. It is overall better but its slow progress and soemtimes pain recurs.  Date of onset: 06/24/25 (date of order)    Relevant medical history: Hearing problems; Migraines or headaches; Pain at night or rest   Dates & types of surgery: 11/2024 tear duct procedure; 1980s tonsil removal    Prior diagnostic imaging/testing results:       Prior therapy history for the same diagnosis, illness or injury: No        Living Environment  Social support: With a significant other or spouse   Type of home: House   Stairs to enter the home: Yes 4 Is there a railing: Yes     Ramp: No   Stairs inside the home: Yes 10     Help at home:    Equipment owned:       Employment: Yes   Hobbies/Interests: Gardening, basketball, swimming, camping    Patient goals for therapy: Stretch, swimming, exercise (with weights/body weight), travel (sitting for long periods in car/flights causes pain); sleep    Pain assessment: Pain " present  Location: R anterior shoulder/Ratin/10 (tension)     Objective   SHOULDER EVALUATION  PAIN: Pain Level at Rest: 0/10  Pain Level with Use: 7/10  Pain Location: shoulder and R anterior   Pain Quality: Aching, Sharp, and tight  Pain Frequency: intermittent  Pain is Worst: activity based  Pain is Exacerbated By: prolonged positions such as with travel, lifting into flexion, full ROM above shoulder  Pain is Relieved By: heat and stretch  Pain Progression: Improved    ROM:   (Degrees) Left AROM Left PROM Right AROM  Right PROM   Shoulder Flexion       Shoulder Extension       Shoulder Abduction WNL  WNL and equal to opposite side, increased tension    Shoulder Adduction       Shoulder Internal Rotation WNL and equal to opposite side, increased tension  WNL and equal to opposite side, increased tension    Shoulder External Rotation WNL  WNL and equal to opposite side, increased tension    Shoulder Horizontal Abduction WNL  WNL and equal to opposite side, increased tension    Shoulder Horizontal Adduction WNL  WNL and equal to opposite side, increased tension    Shoulder Flexion ER       Shoulder Flexion IR       Elbow Extension WNL  WNL    Elbow Flexion WNL  WNL    Pain:   End feel:     STRENGTH:   Pain: - none + mild ++ moderate +++ severe  Strength Scale: 0-5/5 Left Right   Shoulder Flexion 5 4   Shoulder Extension     Shoulder Abduction 5 4   Shoulder Adduction     Shoulder Internal Rotation 5 5-   Shoulder External Rotation 5 5-   Shoulder Horizontal Abduction     Shoulder Horizontal Adduction     Elbow Flexion 5 4   Elbow Extension 5 5   Mid Trap     Lower Trap 4+ 4+   Rhomboid 5 5   Serratus Anterior       SPECIAL TESTS:  Yokum's test: negative    PALPATION:   + Tenderness At Location Left Right   Clavicle     Sternoclavicular     Acromioclavicular  -   Biceps  -   Triceps  -   Supraspinatus  -   Infraspinatus  -   Teres minor  -   Subscapularis  -   Deltoid  -   Levator     Rhomboids  -   Upper trap  -    Incisional     Bicipital groove       CERVICAL SCREEN: WNL    Assessment & Plan   CLINICAL IMPRESSIONS  Medical Diagnosis: Chronic right shoulder pain    Treatment Diagnosis: Chronic right shoulder pain   Impression/Assessment: Patient is a 44 year old female with shoulder pain complaints.  The following significant findings have been identified: Pain, Decreased strength, Impaired muscle performance, and Decreased activity tolerance. These impairments interfere with their ability to perform recreational activities and household chores as compared to previous level of function.     Clinical Decision Making (Complexity):  Clinical Presentation: Stable/Uncomplicated  Clinical Presentation Rationale: based on medical and personal factors listed in PT evaluation  Clinical Decision Making (Complexity): Low complexity    PLAN OF CARE  Treatment Interventions:  Modalities: Contrast Bath, Hot Pack  Interventions: Manual Therapy, Neuromuscular Re-education, Therapeutic Activity, Therapeutic Exercise, Self-Care/Home Management    Long Term Goals     PT Goal 1  Goal Description: Patient will lift at least 8 pounds from hip to overhead height 5 times with right arm without pain or symptoms  Rationale: to maximize safety and independence with performance of ADLs and functional tasks;to maximize safety and independence within the community  Target Date: 09/07/25      Frequency of Treatment: 1x per 2-3 weeks  Duration of Treatment: 10 weeks    Recommended Referrals to Other Professionals:   Education Assessment:   Learner/Method: Patient    Risks and benefits of evaluation/treatment have been explained.   Patient/Family/caregiver agrees with Plan of Care.     Evaluation Time:     PT Eval, Low Complexity Minutes (30742): 20       Signing Clinician: Virginia Cochran PT